# Patient Record
Sex: MALE | Race: WHITE | NOT HISPANIC OR LATINO | Employment: OTHER | URBAN - METROPOLITAN AREA
[De-identification: names, ages, dates, MRNs, and addresses within clinical notes are randomized per-mention and may not be internally consistent; named-entity substitution may affect disease eponyms.]

---

## 2017-03-24 PROBLEM — N18.9 CKD (CHRONIC KIDNEY DISEASE): Status: ACTIVE | Noted: 2017-03-24

## 2018-03-21 PROBLEM — D50.9 MICROCYTIC ANEMIA: Status: ACTIVE | Noted: 2018-03-21

## 2018-03-21 PROBLEM — R80.1 PERSISTENT PROTEINURIA: Status: ACTIVE | Noted: 2018-03-21

## 2018-03-21 PROBLEM — E87.20 METABOLIC ACIDOSIS, NORMAL ANION GAP (NAG): Status: ACTIVE | Noted: 2018-03-21

## 2018-03-21 PROBLEM — E04.1 THYROID NODULE: Status: ACTIVE | Noted: 2018-03-21

## 2018-03-21 PROBLEM — I10 ESSENTIAL (PRIMARY) HYPERTENSION: Status: ACTIVE | Noted: 2018-03-21

## 2018-03-21 PROBLEM — R55 SYNCOPE: Status: ACTIVE | Noted: 2018-03-21

## 2018-03-21 PROBLEM — E11.51 TYPE 2 DIABETES MELLITUS WITH DIABETIC PERIPHERAL ANGIOPATHY WITHOUT GANGRENE, WITHOUT LONG-TERM CURRENT USE OF INSULIN: Status: ACTIVE | Noted: 2018-03-21

## 2018-03-21 PROBLEM — D64.9 NORMOCYTIC ANEMIA: Status: ACTIVE | Noted: 2018-03-21

## 2018-03-21 PROBLEM — N18.4 CKD (CHRONIC KIDNEY DISEASE) STAGE 4, GFR 15-29 ML/MIN: Status: ACTIVE | Noted: 2017-03-24

## 2018-03-21 PROBLEM — I48.20 CHRONIC ATRIAL FIBRILLATION: Status: ACTIVE | Noted: 2018-03-21

## 2018-03-22 PROBLEM — I42.8 NON-ISCHEMIC CARDIOMYOPATHY: Status: ACTIVE | Noted: 2018-03-22

## 2018-03-22 PROBLEM — R63.8 ALTERATION IN NUTRITION: Status: ACTIVE | Noted: 2018-03-22

## 2018-03-23 PROBLEM — D50.0 CHRONIC BLOOD LOSS ANEMIA: Status: ACTIVE | Noted: 2018-03-23

## 2018-03-30 PROBLEM — E27.8 LEFT ADRENAL MASS: Status: ACTIVE | Noted: 2018-03-30

## 2018-04-06 DIAGNOSIS — E27.8 ADRENAL MASS, LEFT: Primary | ICD-10-CM

## 2018-04-06 PROBLEM — Z90.5 SOLITARY KIDNEY, ACQUIRED: Status: ACTIVE | Noted: 2018-04-06

## 2018-04-06 PROBLEM — E11.21 DIABETIC KIDNEY DISEASE: Status: ACTIVE | Noted: 2018-04-06

## 2018-04-12 PROBLEM — E44.0 MALNUTRITION OF MODERATE DEGREE: Status: ACTIVE | Noted: 2018-04-12

## 2018-05-02 PROBLEM — N18.9 CHRONIC RENAL IMPAIRMENT: Status: ACTIVE | Noted: 2018-05-02

## 2018-05-02 PROBLEM — J18.9 PNEUMONIA OF RIGHT LOWER LOBE DUE TO INFECTIOUS ORGANISM: Status: ACTIVE | Noted: 2018-05-02

## 2018-05-02 PROBLEM — I50.9 ACUTE ON CHRONIC CONGESTIVE HEART FAILURE: Status: ACTIVE | Noted: 2018-05-02

## 2018-05-07 PROBLEM — E11.9 DIABETES: Status: ACTIVE | Noted: 2018-05-07

## 2018-05-07 PROBLEM — N18.4 CKD (CHRONIC KIDNEY DISEASE) STAGE 4, GFR 15-29 ML/MIN: Status: ACTIVE | Noted: 2018-05-07

## 2018-05-07 PROBLEM — N17.9 ACUTE RENAL FAILURE SUPERIMPOSED ON STAGE 4 CHRONIC KIDNEY DISEASE: Status: ACTIVE | Noted: 2017-03-24

## 2018-05-08 PROBLEM — E87.0 HYPERNATREMIA: Status: ACTIVE | Noted: 2018-05-08

## 2018-05-08 PROBLEM — Z86.2 HISTORY OF ANEMIA DUE TO CHRONIC KIDNEY DISEASE: Status: ACTIVE | Noted: 2018-05-08

## 2018-05-08 PROBLEM — E87.6 HYPOKALEMIA: Status: ACTIVE | Noted: 2018-05-08

## 2018-05-08 PROBLEM — N18.9 HISTORY OF ANEMIA DUE TO CHRONIC KIDNEY DISEASE: Status: ACTIVE | Noted: 2018-05-08

## 2018-05-14 ENCOUNTER — HOSPITAL ENCOUNTER (INPATIENT)
Facility: HOSPITAL | Age: 83
LOS: 14 days | Discharge: NURSING, REHAB OR LONG-TERM CARE | DRG: 614 | End: 2018-05-28
Attending: SURGERY | Admitting: SURGERY
Payer: MEDICARE

## 2018-05-14 DIAGNOSIS — R06.02 SHORTNESS OF BREATH: Primary | ICD-10-CM

## 2018-05-14 DIAGNOSIS — D49.7 ADRENAL TUMOR: ICD-10-CM

## 2018-05-14 LAB
ALBUMIN SERPL BCP-MCNC: 2 G/DL
ALP SERPL-CCNC: 109 U/L
ALT SERPL W/O P-5'-P-CCNC: <5 U/L
ANION GAP SERPL CALC-SCNC: 5 MMOL/L
APTT BLDCRRT: 24.7 SEC
AST SERPL-CCNC: 6 U/L
BASOPHILS # BLD AUTO: 0.04 K/UL
BASOPHILS NFR BLD: 0.6 %
BILIRUB SERPL-MCNC: 0.7 MG/DL
BUN SERPL-MCNC: 25 MG/DL
CALCIUM SERPL-MCNC: 9.5 MG/DL
CHLORIDE SERPL-SCNC: 105 MMOL/L
CO2 SERPL-SCNC: 26 MMOL/L
CREAT SERPL-MCNC: 2.7 MG/DL
DIFFERENTIAL METHOD: ABNORMAL
EOSINOPHIL # BLD AUTO: 0.1 K/UL
EOSINOPHIL NFR BLD: 1.5 %
ERYTHROCYTE [DISTWIDTH] IN BLOOD BY AUTOMATED COUNT: 19.9 %
EST. GFR  (AFRICAN AMERICAN): 24.6 ML/MIN/1.73 M^2
EST. GFR  (NON AFRICAN AMERICAN): 21.3 ML/MIN/1.73 M^2
ESTIMATED AVG GLUCOSE: 123 MG/DL
GLUCOSE SERPL-MCNC: 141 MG/DL
HBA1C MFR BLD HPLC: 5.9 %
HCT VFR BLD AUTO: 27.6 %
HGB BLD-MCNC: 8.6 G/DL
IMM GRANULOCYTES # BLD AUTO: 0.05 K/UL
IMM GRANULOCYTES NFR BLD AUTO: 0.7 %
INR PPP: 1.1
LYMPHOCYTES # BLD AUTO: 0.9 K/UL
LYMPHOCYTES NFR BLD: 11.7 %
MAGNESIUM SERPL-MCNC: 1.8 MG/DL
MCH RBC QN AUTO: 27 PG
MCHC RBC AUTO-ENTMCNC: 31.2 G/DL
MCV RBC AUTO: 87 FL
MONOCYTES # BLD AUTO: 0.7 K/UL
MONOCYTES NFR BLD: 9.1 %
NEUTROPHILS # BLD AUTO: 5.5 K/UL
NEUTROPHILS NFR BLD: 76.4 %
NRBC BLD-RTO: 0 /100 WBC
PHOSPHATE SERPL-MCNC: 2.7 MG/DL
PLATELET # BLD AUTO: 216 K/UL
PMV BLD AUTO: 11.2 FL
POTASSIUM SERPL-SCNC: 4.3 MMOL/L
PROT SERPL-MCNC: 6.7 G/DL
PROTHROMBIN TIME: 11 SEC
RBC # BLD AUTO: 3.19 M/UL
SODIUM SERPL-SCNC: 136 MMOL/L
WBC # BLD AUTO: 7.25 K/UL

## 2018-05-14 PROCEDURE — 84100 ASSAY OF PHOSPHORUS: CPT

## 2018-05-14 PROCEDURE — 36415 COLL VENOUS BLD VENIPUNCTURE: CPT

## 2018-05-14 PROCEDURE — 83735 ASSAY OF MAGNESIUM: CPT

## 2018-05-14 PROCEDURE — 25000003 PHARM REV CODE 250: Performed by: STUDENT IN AN ORGANIZED HEALTH CARE EDUCATION/TRAINING PROGRAM

## 2018-05-14 PROCEDURE — 85730 THROMBOPLASTIN TIME PARTIAL: CPT

## 2018-05-14 PROCEDURE — 85610 PROTHROMBIN TIME: CPT

## 2018-05-14 PROCEDURE — 83036 HEMOGLOBIN GLYCOSYLATED A1C: CPT

## 2018-05-14 PROCEDURE — 82088 ASSAY OF ALDOSTERONE: CPT | Mod: 91

## 2018-05-14 PROCEDURE — 82088 ASSAY OF ALDOSTERONE: CPT

## 2018-05-14 PROCEDURE — 85025 COMPLETE CBC W/AUTO DIFF WBC: CPT | Mod: 91

## 2018-05-14 PROCEDURE — 80053 COMPREHEN METABOLIC PANEL: CPT | Mod: 91

## 2018-05-14 PROCEDURE — 83835 ASSAY OF METANEPHRINES: CPT

## 2018-05-14 PROCEDURE — 85520 HEPARIN ASSAY: CPT

## 2018-05-14 PROCEDURE — 20600001 HC STEP DOWN PRIVATE ROOM

## 2018-05-14 RX ORDER — IBUPROFEN 200 MG
16 TABLET ORAL
Status: DISCONTINUED | OUTPATIENT
Start: 2018-05-14 | End: 2018-05-28 | Stop reason: HOSPADM

## 2018-05-14 RX ORDER — AMLODIPINE BESYLATE 5 MG/1
5 TABLET ORAL DAILY
Status: DISCONTINUED | OUTPATIENT
Start: 2018-05-15 | End: 2018-05-19

## 2018-05-14 RX ORDER — SODIUM BICARBONATE 650 MG/1
1 TABLET ORAL 3 TIMES DAILY
Status: DISCONTINUED | OUTPATIENT
Start: 2018-05-14 | End: 2018-05-28 | Stop reason: HOSPADM

## 2018-05-14 RX ORDER — IBUPROFEN 200 MG
24 TABLET ORAL
Status: DISCONTINUED | OUTPATIENT
Start: 2018-05-14 | End: 2018-05-28 | Stop reason: HOSPADM

## 2018-05-14 RX ORDER — IPRATROPIUM BROMIDE AND ALBUTEROL SULFATE 2.5; .5 MG/3ML; MG/3ML
3 SOLUTION RESPIRATORY (INHALATION) EVERY 8 HOURS
Status: DISCONTINUED | OUTPATIENT
Start: 2018-05-15 | End: 2018-05-20

## 2018-05-14 RX ORDER — GLUCAGON 1 MG
1 KIT INJECTION
Status: DISCONTINUED | OUTPATIENT
Start: 2018-05-14 | End: 2018-05-28 | Stop reason: HOSPADM

## 2018-05-14 RX ORDER — HEPARIN SODIUM,PORCINE/D5W 25000/250
17 INTRAVENOUS SOLUTION INTRAVENOUS CONTINUOUS
Status: DISPENSED | OUTPATIENT
Start: 2018-05-14 | End: 2018-05-21

## 2018-05-14 RX ORDER — POLYETHYLENE GLYCOL 3350 17 G/17G
17 POWDER, FOR SOLUTION ORAL DAILY
Status: DISCONTINUED | OUTPATIENT
Start: 2018-05-15 | End: 2018-05-21

## 2018-05-14 RX ORDER — INSULIN ASPART 100 [IU]/ML
0-5 INJECTION, SOLUTION INTRAVENOUS; SUBCUTANEOUS
Status: DISCONTINUED | OUTPATIENT
Start: 2018-05-14 | End: 2018-05-28 | Stop reason: HOSPADM

## 2018-05-14 RX ORDER — MEMANTINE HYDROCHLORIDE 5 MG/1
10 TABLET ORAL 2 TIMES DAILY
Status: DISCONTINUED | OUTPATIENT
Start: 2018-05-14 | End: 2018-05-28 | Stop reason: HOSPADM

## 2018-05-14 RX ORDER — PANTOPRAZOLE SODIUM 40 MG/1
40 TABLET, DELAYED RELEASE ORAL DAILY
Status: DISCONTINUED | OUTPATIENT
Start: 2018-05-15 | End: 2018-05-28 | Stop reason: HOSPADM

## 2018-05-14 RX ORDER — ACETAMINOPHEN 325 MG/1
650 TABLET ORAL EVERY 8 HOURS PRN
Status: DISCONTINUED | OUTPATIENT
Start: 2018-05-14 | End: 2018-05-14

## 2018-05-14 RX ORDER — ACETAMINOPHEN 325 MG/1
650 TABLET ORAL EVERY 8 HOURS PRN
Status: DISCONTINUED | OUTPATIENT
Start: 2018-05-14 | End: 2018-05-21

## 2018-05-14 RX ORDER — SODIUM CHLORIDE 0.9 % (FLUSH) 0.9 %
3 SYRINGE (ML) INJECTION
Status: DISCONTINUED | OUTPATIENT
Start: 2018-05-14 | End: 2018-05-28 | Stop reason: HOSPADM

## 2018-05-14 RX ORDER — ONDANSETRON 8 MG/1
8 TABLET, ORALLY DISINTEGRATING ORAL EVERY 8 HOURS PRN
Status: DISCONTINUED | OUTPATIENT
Start: 2018-05-14 | End: 2018-05-28 | Stop reason: HOSPADM

## 2018-05-14 RX ORDER — CLONIDINE HYDROCHLORIDE 0.1 MG/1
0.3 TABLET ORAL 3 TIMES DAILY
Status: DISCONTINUED | OUTPATIENT
Start: 2018-05-14 | End: 2018-05-28 | Stop reason: HOSPADM

## 2018-05-14 RX ORDER — ACETAMINOPHEN 325 MG/1
325 TABLET ORAL EVERY 6 HOURS PRN
Status: DISCONTINUED | OUTPATIENT
Start: 2018-05-14 | End: 2018-05-14

## 2018-05-14 RX ORDER — FERROUS SULFATE 325(65) MG
325 TABLET, DELAYED RELEASE (ENTERIC COATED) ORAL 2 TIMES DAILY
Status: DISCONTINUED | OUTPATIENT
Start: 2018-05-14 | End: 2018-05-28 | Stop reason: HOSPADM

## 2018-05-14 RX ORDER — CARVEDILOL 25 MG/1
25 TABLET ORAL 2 TIMES DAILY WITH MEALS
Status: DISCONTINUED | OUTPATIENT
Start: 2018-05-14 | End: 2018-05-28 | Stop reason: HOSPADM

## 2018-05-14 RX ORDER — DIPHENHYDRAMINE HYDROCHLORIDE 50 MG/ML
25 INJECTION INTRAMUSCULAR; INTRAVENOUS EVERY 4 HOURS PRN
Status: DISCONTINUED | OUTPATIENT
Start: 2018-05-14 | End: 2018-05-21

## 2018-05-14 RX ORDER — ISOSORBIDE DINITRATE AND HYDRALAZINE HYDROCHLORIDE 37.5; 2 MG/1; MG/1
2 TABLET ORAL 3 TIMES DAILY
Status: DISCONTINUED | OUTPATIENT
Start: 2018-05-14 | End: 2018-05-28 | Stop reason: HOSPADM

## 2018-05-14 RX ORDER — DUTASTERIDE 0.5 MG/1
0.5 CAPSULE, LIQUID FILLED ORAL DAILY
Status: DISCONTINUED | OUTPATIENT
Start: 2018-05-15 | End: 2018-05-28 | Stop reason: HOSPADM

## 2018-05-14 RX ADMIN — CLONIDINE HYDROCHLORIDE 0.3 MG: 0.1 TABLET ORAL at 09:05

## 2018-05-14 RX ADMIN — CARVEDILOL 25 MG: 25 TABLET, FILM COATED ORAL at 09:05

## 2018-05-14 RX ADMIN — MEMANTINE HYDROCHLORIDE 10 MG: 5 TABLET ORAL at 09:05

## 2018-05-14 RX ADMIN — SODIUM BICARBONATE 650 MG TABLET 650 MG: at 09:05

## 2018-05-14 RX ADMIN — FERROUS SULFATE TAB EC 325 MG (65 MG FE EQUIVALENT) 325 MG: 325 (65 FE) TABLET DELAYED RESPONSE at 09:05

## 2018-05-14 RX ADMIN — HYDRALAZINE HYDROCHLORIDE AND ISOSORBIDE DINITRATE 2 TABLET: 37.5; 2 TABLET, FILM COATED ORAL at 09:05

## 2018-05-14 NOTE — PROGRESS NOTES
Care plan reviewed and understood by patient and daughter. Resp rate even and unlabored. VSS.  Patient complains of unable to urinate. Bladder scan showed 723ml after voiding 50mls. Per Dr. Spann placed a calabrese. Patient with no reports of nausea. Daughter phone number placed in chart eith password set when they call. Patient remain free of falls. No acute pain or distress noted. Will continue to monitor.

## 2018-05-15 PROBLEM — R33.9 URINARY RETENTION: Status: ACTIVE | Noted: 2018-05-15

## 2018-05-15 PROBLEM — F03.90 DEMENTIA: Status: ACTIVE | Noted: 2018-05-15

## 2018-05-15 LAB
ALBUMIN SERPL BCP-MCNC: 2.1 G/DL
ALP SERPL-CCNC: 111 U/L
ALT SERPL W/O P-5'-P-CCNC: <5 U/L
ANION GAP SERPL CALC-SCNC: 10 MMOL/L
AST SERPL-CCNC: 6 U/L
BASOPHILS # BLD AUTO: 0.04 K/UL
BASOPHILS NFR BLD: 0.6 %
BILIRUB SERPL-MCNC: 0.7 MG/DL
BUN SERPL-MCNC: 24 MG/DL
CALCIUM SERPL-MCNC: 9.3 MG/DL
CHLORIDE SERPL-SCNC: 106 MMOL/L
CO2 SERPL-SCNC: 22 MMOL/L
CREAT SERPL-MCNC: 2.5 MG/DL
DIFFERENTIAL METHOD: ABNORMAL
EOSINOPHIL # BLD AUTO: 0.1 K/UL
EOSINOPHIL NFR BLD: 1.9 %
ERYTHROCYTE [DISTWIDTH] IN BLOOD BY AUTOMATED COUNT: 19.8 %
EST. GFR  (AFRICAN AMERICAN): 27 ML/MIN/1.73 M^2
EST. GFR  (NON AFRICAN AMERICAN): 23.4 ML/MIN/1.73 M^2
FACT X PPP CHRO-ACNC: 0.2 IU/ML
FACT X PPP CHRO-ACNC: 0.32 IU/ML
FACT X PPP CHRO-ACNC: <0.1 IU/ML
GLUCOSE SERPL-MCNC: 129 MG/DL
HCT VFR BLD AUTO: 27.8 %
HGB BLD-MCNC: 8.6 G/DL
IMM GRANULOCYTES # BLD AUTO: 0.04 K/UL
IMM GRANULOCYTES NFR BLD AUTO: 0.6 %
LYMPHOCYTES # BLD AUTO: 0.9 K/UL
LYMPHOCYTES NFR BLD: 12.7 %
MAGNESIUM SERPL-MCNC: 2 MG/DL
MCH RBC QN AUTO: 26.6 PG
MCHC RBC AUTO-ENTMCNC: 30.9 G/DL
MCV RBC AUTO: 86 FL
MONOCYTES # BLD AUTO: 0.6 K/UL
MONOCYTES NFR BLD: 8.9 %
NEUTROPHILS # BLD AUTO: 5.1 K/UL
NEUTROPHILS NFR BLD: 75.3 %
NRBC BLD-RTO: 0 /100 WBC
PHOSPHATE SERPL-MCNC: 2.9 MG/DL
PLATELET # BLD AUTO: 235 K/UL
PMV BLD AUTO: 12 FL
POCT GLUCOSE: 155 MG/DL (ref 70–110)
POCT GLUCOSE: 200 MG/DL (ref 70–110)
POTASSIUM SERPL-SCNC: 3.9 MMOL/L
PROT SERPL-MCNC: 6.7 G/DL
RBC # BLD AUTO: 3.23 M/UL
SODIUM SERPL-SCNC: 138 MMOL/L
WBC # BLD AUTO: 6.77 K/UL

## 2018-05-15 PROCEDURE — 80053 COMPREHEN METABOLIC PANEL: CPT

## 2018-05-15 PROCEDURE — 63600175 PHARM REV CODE 636 W HCPCS: Performed by: STUDENT IN AN ORGANIZED HEALTH CARE EDUCATION/TRAINING PROGRAM

## 2018-05-15 PROCEDURE — 25000242 PHARM REV CODE 250 ALT 637 W/ HCPCS: Performed by: STUDENT IN AN ORGANIZED HEALTH CARE EDUCATION/TRAINING PROGRAM

## 2018-05-15 PROCEDURE — 94761 N-INVAS EAR/PLS OXIMETRY MLT: CPT

## 2018-05-15 PROCEDURE — 83735 ASSAY OF MAGNESIUM: CPT

## 2018-05-15 PROCEDURE — 36415 COLL VENOUS BLD VENIPUNCTURE: CPT

## 2018-05-15 PROCEDURE — 84100 ASSAY OF PHOSPHORUS: CPT

## 2018-05-15 PROCEDURE — 85025 COMPLETE CBC W/AUTO DIFF WBC: CPT

## 2018-05-15 PROCEDURE — 85520 HEPARIN ASSAY: CPT

## 2018-05-15 PROCEDURE — 99223 1ST HOSP IP/OBS HIGH 75: CPT | Mod: AI,,, | Performed by: SURGERY

## 2018-05-15 PROCEDURE — 25000003 PHARM REV CODE 250: Performed by: STUDENT IN AN ORGANIZED HEALTH CARE EDUCATION/TRAINING PROGRAM

## 2018-05-15 PROCEDURE — 94640 AIRWAY INHALATION TREATMENT: CPT

## 2018-05-15 PROCEDURE — 20600001 HC STEP DOWN PRIVATE ROOM

## 2018-05-15 PROCEDURE — 82530 CORTISOL FREE: CPT

## 2018-05-15 RX ADMIN — FERROUS SULFATE TAB EC 325 MG (65 MG FE EQUIVALENT) 325 MG: 325 (65 FE) TABLET DELAYED RESPONSE at 09:05

## 2018-05-15 RX ADMIN — IPRATROPIUM BROMIDE AND ALBUTEROL SULFATE 3 ML: .5; 3 SOLUTION RESPIRATORY (INHALATION) at 12:05

## 2018-05-15 RX ADMIN — POLYETHYLENE GLYCOL 3350 17 G: 17 POWDER, FOR SOLUTION ORAL at 09:05

## 2018-05-15 RX ADMIN — CLONIDINE HYDROCHLORIDE 0.3 MG: 0.1 TABLET ORAL at 03:05

## 2018-05-15 RX ADMIN — SODIUM BICARBONATE 650 MG TABLET 650 MG: at 09:05

## 2018-05-15 RX ADMIN — DUTASTERIDE 0.5 MG: 0.5 CAPSULE, LIQUID FILLED ORAL at 09:05

## 2018-05-15 RX ADMIN — MEMANTINE HYDROCHLORIDE 10 MG: 5 TABLET ORAL at 08:05

## 2018-05-15 RX ADMIN — HYDRALAZINE HYDROCHLORIDE AND ISOSORBIDE DINITRATE 2 TABLET: 37.5; 2 TABLET, FILM COATED ORAL at 09:05

## 2018-05-15 RX ADMIN — HEPARIN SODIUM 18.97 UNITS/KG/HR: 10000 INJECTION, SOLUTION INTRAVENOUS at 04:05

## 2018-05-15 RX ADMIN — FERROUS SULFATE TAB EC 325 MG (65 MG FE EQUIVALENT) 325 MG: 325 (65 FE) TABLET DELAYED RESPONSE at 08:05

## 2018-05-15 RX ADMIN — CLONIDINE HYDROCHLORIDE 0.3 MG: 0.1 TABLET ORAL at 08:05

## 2018-05-15 RX ADMIN — MEMANTINE HYDROCHLORIDE 10 MG: 5 TABLET ORAL at 09:05

## 2018-05-15 RX ADMIN — HYDRALAZINE HYDROCHLORIDE AND ISOSORBIDE DINITRATE 2 TABLET: 37.5; 2 TABLET, FILM COATED ORAL at 03:05

## 2018-05-15 RX ADMIN — PANTOPRAZOLE SODIUM 40 MG: 40 TABLET, DELAYED RELEASE ORAL at 09:05

## 2018-05-15 RX ADMIN — CARVEDILOL 25 MG: 25 TABLET, FILM COATED ORAL at 05:05

## 2018-05-15 RX ADMIN — AMLODIPINE BESYLATE 5 MG: 5 TABLET ORAL at 09:05

## 2018-05-15 RX ADMIN — HYDRALAZINE HYDROCHLORIDE AND ISOSORBIDE DINITRATE 2 TABLET: 37.5; 2 TABLET, FILM COATED ORAL at 08:05

## 2018-05-15 RX ADMIN — HEPARIN SODIUM 17 UNITS/KG/HR: 10000 INJECTION, SOLUTION INTRAVENOUS at 12:05

## 2018-05-15 RX ADMIN — SODIUM BICARBONATE 650 MG TABLET 650 MG: at 08:05

## 2018-05-15 RX ADMIN — HEPARIN SODIUM 19 UNITS/KG/HR: 10000 INJECTION, SOLUTION INTRAVENOUS at 07:05

## 2018-05-15 RX ADMIN — IPRATROPIUM BROMIDE AND ALBUTEROL SULFATE 3 ML: .5; 3 SOLUTION RESPIRATORY (INHALATION) at 07:05

## 2018-05-15 RX ADMIN — IPRATROPIUM BROMIDE AND ALBUTEROL SULFATE 3 ML: .5; 3 SOLUTION RESPIRATORY (INHALATION) at 03:05

## 2018-05-15 RX ADMIN — CLONIDINE HYDROCHLORIDE 0.3 MG: 0.1 TABLET ORAL at 09:05

## 2018-05-15 RX ADMIN — SODIUM BICARBONATE 650 MG TABLET 650 MG: at 03:05

## 2018-05-15 RX ADMIN — CARVEDILOL 25 MG: 25 TABLET, FILM COATED ORAL at 09:05

## 2018-05-15 NOTE — SUBJECTIVE & OBJECTIVE
Past Medical History:   Diagnosis Date    Acute on chronic congestive heart failure     Anemia     receiving blood today 03/24/2017    Blindness, right eye, normal vision left eye     Cancer     kidney cancer 2007    Chronic atrial fibrillation 3/21/2018    Coronary artery disease     pacemaker 2014    Dementia 5/15/2018    Diabetes mellitus, type 2     Disorder of kidney and ureter     right kidney removed    Encounter for blood transfusion     GERD (gastroesophageal reflux disease)     sometimes heartburn    Hyperlipidemia     Hypertension        Past Surgical History:   Procedure Laterality Date    CARDIAC PACEMAKER PLACEMENT      CARDIAC PACEMAKER PLACEMENT Left 12/09/2014    COLON SURGERY      COLONOSCOPY N/A 3/26/2018    Procedure: COLONOSCOPY;  Surgeon: Jonas Belle MD;  Location: [facility];  Service: Endoscopy;  Laterality: N/A;    KIDNEY SURGERY Right     for cancer       Review of patient's allergies indicates:  No Known Allergies    Current Facility-Administered Medications on File Prior to Encounter   Medication    [DISCONTINUED] acetaminophen tablet 325 mg    [DISCONTINUED] albuterol-ipratropium 2.5mg-0.5mg/3mL nebulizer solution 3 mL    [DISCONTINUED] amLODIPine tablet 5 mg    [DISCONTINUED] carvedilol tablet 25 mg    [DISCONTINUED] cloNIDine tablet 0.3 mg    [DISCONTINUED] dutasteride capsule 0.5 mg    [DISCONTINUED] ferrous sulfate tablet 325 mg    [DISCONTINUED] hydrALAZINE injection 5 mg    [DISCONTINUED] isosorbide-hydrALAZINE 20-37.5 mg per tablet 2 tablet    [DISCONTINUED] memantine tablet 10 mg    [DISCONTINUED] morphine injection 2 mg    [DISCONTINUED] olmesartan tablet 20 mg    [DISCONTINUED] ondansetron injection 4 mg    [DISCONTINUED] pantoprazole EC tablet 40 mg    [DISCONTINUED] polyethylene glycol packet 17 g    [DISCONTINUED] sodium bicarbonate tablet 650 mg     Current Outpatient Prescriptions on File Prior to Encounter   Medication Sig     acetaminophen (TYLENOL) 325 MG tablet Take 1 tablet (325 mg total) by mouth every 6 (six) hours as needed.    albuterol-ipratropium 2.5mg-0.5mg/3mL (DUO-NEB) 0.5 mg-3 mg(2.5 mg base)/3 mL nebulizer solution Take 3 mLs by nebulization every 8 (eight) hours. Rescue    amLODIPine (NORVASC) 5 MG tablet Take 1 tablet (5 mg total) by mouth once daily.    carvedilol (COREG) 25 MG tablet Take 1 tablet (25 mg total) by mouth 2 (two) times daily with meals.    cloNIDine (CATAPRES) 0.3 MG tablet Take 1 tablet (0.3 mg total) by mouth 3 (three) times daily.    dutasteride (AVODART) 0.5 mg capsule Take 0.5 mg by mouth once daily.    ferrous sulfate 325 (65 FE) MG EC tablet Take 1 tablet (325 mg total) by mouth 2 (two) times daily. (Patient taking differently: Take 325 mg by mouth 2 (two) times daily. For 50 days)    hydrALAZINE (APRESOLINE) 20 mg/mL injection Inject 0.25 mLs (5 mg total) into the vein every 6 (six) hours as needed (170mmhg).    isosorbide-hydrALAZINE 20-37.5 mg (BIDIL) 20-37.5 mg Tab Take 2 tablets by mouth 3 (three) times daily.    memantine (NAMENDA) 10 MG Tab Take 10 mg by mouth 2 (two) times daily.     nut.tx.imp.renal fxn,lac-reduc (SUPLENA CARB STEADY) 0.04 gram-1.8 kcal/mL Liqd 1 can tid    olmesartan (BENICAR) 20 MG tablet Take 1 tablet (20 mg total) by mouth once daily.    ondansetron 4 mg/2 mL Soln Inject 4 mg into the vein every 6 (six) hours as needed.    pantoprazole (PROTONIX) 40 MG tablet Take 1 tablet (40 mg total) by mouth once daily.    polyethylene glycol (GLYCOLAX) 17 gram PwPk Take 17 g by mouth once daily.    sodium bicarbonate 650 MG tablet Take 1 tablet (650 mg total) by mouth 3 (three) times daily.     Family History     Problem Relation (Age of Onset)    Cancer Father, Brother        Social History Main Topics    Smoking status: Light Tobacco Smoker    Smokeless tobacco: Never Used      Comment: no cigarettes in 6 weeks approximately    Alcohol use No    Drug use: No     Sexual activity: No     Review of Systems   Constitution: Negative for chills and fever.   HENT: Negative for congestion and stridor.    Eyes: Negative for double vision.   Cardiovascular: Positive for dyspnea on exertion and orthopnea. Negative for chest pain, irregular heartbeat, leg swelling, palpitations and syncope.   Respiratory: Positive for shortness of breath. Negative for hemoptysis.    Musculoskeletal: Negative for myalgias.   Gastrointestinal: Positive for abdominal pain. Negative for jaundice, nausea and vomiting.   Genitourinary: Positive for incomplete emptying.   Psychiatric/Behavioral: Negative for altered mental status and hallucinations.     Objective:     Vital Signs (Most Recent):  Temp: 98.5 °F (36.9 °C) (05/15/18 1142)  Pulse: 72 (05/15/18 1142)  Resp: 18 (05/15/18 1142)  BP: (!) 184/93 (05/15/18 1142)  SpO2: 96 % (05/15/18 1142) Vital Signs (24h Range):  Temp:  [96.1 °F (35.6 °C)-98.5 °F (36.9 °C)] 98.5 °F (36.9 °C)  Pulse:  [69-87] 72  Resp:  [15-20] 18  SpO2:  [95 %-97 %] 96 %  BP: (160-184)/(81-93) 184/93     Weight: 106.5 kg (234 lb 12.8 oz)  Body mass index is 29.35 kg/m².    SpO2: 96 %  O2 Device (Oxygen Therapy): room air      Intake/Output Summary (Last 24 hours) at 05/15/18 1519  Last data filed at 05/15/18 0753   Gross per 24 hour   Intake                0 ml   Output              900 ml   Net             -900 ml       Lines/Drains/Airways     Drain                 Urethral Catheter 05/14/18 1920 less than 1 day          Peripheral Intravenous Line                 Peripheral IV - Single Lumen 05/10/18 0000 Right Forearm 5 days                Physical Exam   Constitutional: He appears well-developed and well-nourished. No distress.   HENT:   Head: Normocephalic and atraumatic.   Eyes: Pupils are equal, round, and reactive to light. No scleral icterus.   Neck: Normal range of motion. Neck supple. No JVD present.   Cardiovascular: Normal rate.    Murmur (2/6 systolic murmur  HB@L4ICS) heard.  Paced at 70bpm   Pulmonary/Chest: Effort normal. No stridor. No respiratory distress. He has no wheezes. He has rales (L>R).   Skin: He is not diaphoretic.   Nursing note and vitals reviewed.      Significant Labs:    Recent Results (from the past 24 hour(s))   Hemoglobin A1c if not done in past 6 weeks    Collection Time: 05/14/18  8:51 PM   Result Value Ref Range    Hemoglobin A1C 5.9 (H) 4.0 - 5.6 %    Estimated Avg Glucose 123 68 - 131 mg/dL   Comprehensive metabolic panel    Collection Time: 05/14/18  8:51 PM   Result Value Ref Range    Sodium 136 136 - 145 mmol/L    Potassium 4.3 3.5 - 5.1 mmol/L    Chloride 105 95 - 110 mmol/L    CO2 26 23 - 29 mmol/L    Glucose 141 (H) 70 - 110 mg/dL    BUN, Bld 25 (H) 8 - 23 mg/dL    Creatinine 2.7 (H) 0.5 - 1.4 mg/dL    Calcium 9.5 8.7 - 10.5 mg/dL    Total Protein 6.7 6.0 - 8.4 g/dL    Albumin 2.0 (L) 3.5 - 5.2 g/dL    Total Bilirubin 0.7 0.1 - 1.0 mg/dL    Alkaline Phosphatase 109 55 - 135 U/L    AST 6 (L) 10 - 40 U/L    ALT <5 (L) 10 - 44 U/L    Anion Gap 5 (L) 8 - 16 mmol/L    eGFR if African American 24.6 (A) >60 mL/min/1.73 m^2    eGFR if non  21.3 (A) >60 mL/min/1.73 m^2   Magnesium    Collection Time: 05/14/18  8:51 PM   Result Value Ref Range    Magnesium 1.8 1.6 - 2.6 mg/dL   Phosphorus    Collection Time: 05/14/18  8:51 PM   Result Value Ref Range    Phosphorus 2.7 2.7 - 4.5 mg/dL   CBC auto differential    Collection Time: 05/14/18  8:51 PM   Result Value Ref Range    WBC 7.25 3.90 - 12.70 K/uL    RBC 3.19 (L) 4.60 - 6.20 M/uL    Hemoglobin 8.6 (L) 14.0 - 18.0 g/dL    Hematocrit 27.6 (L) 40.0 - 54.0 %    MCV 87 82 - 98 fL    MCH 27.0 27.0 - 31.0 pg    MCHC 31.2 (L) 32.0 - 36.0 g/dL    RDW 19.9 (H) 11.5 - 14.5 %    Platelets 216 150 - 350 K/uL    MPV 11.2 9.2 - 12.9 fL    Immature Granulocytes 0.7 (H) 0.0 - 0.5 %    Gran # (ANC) 5.5 1.8 - 7.7 K/uL    Immature Grans (Abs) 0.05 (H) 0.00 - 0.04 K/uL    Lymph # 0.9 (L) 1.0 - 4.8  K/uL    Mono # 0.7 0.3 - 1.0 K/uL    Eos # 0.1 0.0 - 0.5 K/uL    Baso # 0.04 0.00 - 0.20 K/uL    nRBC 0 0 /100 WBC    Gran% 76.4 (H) 38.0 - 73.0 %    Lymph% 11.7 (L) 18.0 - 48.0 %    Mono% 9.1 4.0 - 15.0 %    Eosinophil% 1.5 0.0 - 8.0 %    Basophil% 0.6 0.0 - 1.9 %    Differential Method Automated    Protime-INR    Collection Time: 05/14/18  8:51 PM   Result Value Ref Range    Prothrombin Time 11.0 9.0 - 12.5 sec    INR 1.1 0.8 - 1.2   APTT    Collection Time: 05/14/18  8:51 PM   Result Value Ref Range    aPTT 24.7 21.0 - 32.0 sec   Anti-Xa Heparin Monitoring    Collection Time: 05/14/18 11:47 PM   Result Value Ref Range    Heparin Anti-Xa <0.10 (L) 0.30 - 0.70 IU/mL   CBC auto differential    Collection Time: 05/15/18  4:52 AM   Result Value Ref Range    WBC 6.77 3.90 - 12.70 K/uL    RBC 3.23 (L) 4.60 - 6.20 M/uL    Hemoglobin 8.6 (L) 14.0 - 18.0 g/dL    Hematocrit 27.8 (L) 40.0 - 54.0 %    MCV 86 82 - 98 fL    MCH 26.6 (L) 27.0 - 31.0 pg    MCHC 30.9 (L) 32.0 - 36.0 g/dL    RDW 19.8 (H) 11.5 - 14.5 %    Platelets 235 150 - 350 K/uL    MPV 12.0 9.2 - 12.9 fL    Immature Granulocytes 0.6 (H) 0.0 - 0.5 %    Gran # (ANC) 5.1 1.8 - 7.7 K/uL    Immature Grans (Abs) 0.04 0.00 - 0.04 K/uL    Lymph # 0.9 (L) 1.0 - 4.8 K/uL    Mono # 0.6 0.3 - 1.0 K/uL    Eos # 0.1 0.0 - 0.5 K/uL    Baso # 0.04 0.00 - 0.20 K/uL    nRBC 0 0 /100 WBC    Gran% 75.3 (H) 38.0 - 73.0 %    Lymph% 12.7 (L) 18.0 - 48.0 %    Mono% 8.9 4.0 - 15.0 %    Eosinophil% 1.9 0.0 - 8.0 %    Basophil% 0.6 0.0 - 1.9 %    Differential Method Automated    Comprehensive metabolic panel    Collection Time: 05/15/18  4:52 AM   Result Value Ref Range    Sodium 138 136 - 145 mmol/L    Potassium 3.9 3.5 - 5.1 mmol/L    Chloride 106 95 - 110 mmol/L    CO2 22 (L) 23 - 29 mmol/L    Glucose 129 (H) 70 - 110 mg/dL    BUN, Bld 24 (H) 8 - 23 mg/dL    Creatinine 2.5 (H) 0.5 - 1.4 mg/dL    Calcium 9.3 8.7 - 10.5 mg/dL    Total Protein 6.7 6.0 - 8.4 g/dL    Albumin 2.1 (L) 3.5 -  5.2 g/dL    Total Bilirubin 0.7 0.1 - 1.0 mg/dL    Alkaline Phosphatase 111 55 - 135 U/L    AST 6 (L) 10 - 40 U/L    ALT <5 (L) 10 - 44 U/L    Anion Gap 10 8 - 16 mmol/L    eGFR if African American 27.0 (A) >60 mL/min/1.73 m^2    eGFR if non  23.4 (A) >60 mL/min/1.73 m^2   Magnesium    Collection Time: 05/15/18  4:52 AM   Result Value Ref Range    Magnesium 2.0 1.6 - 2.6 mg/dL   Phosphorus    Collection Time: 05/15/18  4:52 AM   Result Value Ref Range    Phosphorus 2.9 2.7 - 4.5 mg/dL   Anti-Xa Heparin Monitoring    Collection Time: 05/15/18  4:52 AM   Result Value Ref Range    Heparin Anti-Xa 0.20 (L) 0.30 - 0.70 IU/mL   Anti-Xa Heparin Monitoring    Collection Time: 05/15/18  1:52 PM   Result Value Ref Range    Heparin Anti-Xa 0.32 0.30 - 0.70 IU/mL       Microbiology Results (last 7 days)     ** No results found for the last 168 hours. **            Significant Imaging:     Imaging Results    None       CXR on presented originally on 5/2/18:   Heterogeneous opacification right mid and right lower lung zones, may reflect edema or pneumonia.    Last ECHO (bubble) was 3/22/18:  Final Impressions  1. The study quality is good.   2. Global left ventricular systolic function is moderately decreased.   The left ventricular ejection fraction is 36%.   3. Left ventricular diastolic function is indeterminate.   4. Left ventricular diastolic septal thickness is 1.8 cms. Left   ventricular diastolic postero basal free wall thickness is 1.8 cms.   Noted left ventricular hypertrophy. It is moderate.   5. Patient appears to have Moderate aortic valve stenosis. JOON (VTI)   is 1.58 cm^2.   6. Mild calcification of the aortic valve is noted with reduced cuspal   excursion. Mild mitral annular calcification is noted.   7. The left atrium is moderately enlarged. Left atrial diameter is 5.1   cms.    8. Mild to moderate (1-2+) tricuspid regurgitation. Mild (1+) mitral   regurgitation.    9. No bubbles were visualized  in the left heart chambers at rest or   with valsalva. The bubble study was negative, ruling out patent   foramen ovale.    10. Subcostals were not obtained.  11. Patient has a pacemaker.  12. The pulmonary artery systolic pressure is 39 mmHg.

## 2018-05-15 NOTE — SUBJECTIVE & OBJECTIVE
Interval History:   Patient seen and examined, no acute events overnight  Tolerating minimal diet  +F/BM  Afebrile/baseline hypertensive    Medications:  Continuous Infusions:   heparin (porcine) in D5W 17 Units/kg/hr (05/15/18 0013)     Scheduled Meds:   albuterol-ipratropium 2.5mg-0.5mg/3mL  3 mL Nebulization Q8H    amLODIPine  5 mg Oral Daily    carvedilol  25 mg Oral BID WM    cloNIDine  0.3 mg Oral TID    dutasteride  0.5 mg Oral Daily    ferrous sulfate  325 mg Oral BID    isosorbide-hydrALAZINE 20-37.5 mg  2 tablet Oral TID    memantine  10 mg Oral BID    pantoprazole  40 mg Oral Daily    polyethylene glycol  17 g Oral Daily    sodium bicarbonate  1 tablet Oral TID     PRN Meds:acetaminophen, dextrose 50%, dextrose 50%, diphenhydrAMINE, glucagon (human recombinant), glucose, glucose, heparin (PORCINE), heparin (PORCINE), insulin aspart U-100, ondansetron, promethazine (PHENERGAN) IVPB, sodium chloride 0.9%     Review of patient's allergies indicates:  No Known Allergies  Objective:     Vital Signs (Most Recent):  Temp: 97.5 °F (36.4 °C) (05/15/18 0427)  Pulse: 69 (05/15/18 0722)  Resp: 15 (05/15/18 0705)  BP: (!) 175/84 (05/15/18 0427)  SpO2: 97 % (05/15/18 0705) Vital Signs (24h Range):  Temp:  [96.1 °F (35.6 °C)-97.9 °F (36.6 °C)] 97.5 °F (36.4 °C)  Pulse:  [69-87] 69  Resp:  [15-20] 15  SpO2:  [95 %-97 %] 97 %  BP: (160-189)/() 175/84     Weight: 106.5 kg (234 lb 12.8 oz)  Body mass index is 29.35 kg/m².    Intake/Output - Last 3 Shifts       05/13 0700 - 05/14 0659 05/14 0700 - 05/15 0659 05/15 0700 - 05/16 0659    Urine (mL/kg/hr)  600     Total Output   600      Net   -600                   Physical Exam   Constitutional: He appears well-developed and well-nourished. No distress.   HENT:   Head: Normocephalic and atraumatic.   Cardiovascular: Normal rate.    Appears completely paced, no coordinated rhythm   Pulmonary/Chest: Effort normal. No respiratory distress.   Abdominal:   Soft,  non-tender, large, hard, nontender LUQ mass    Genitourinary:   Genitourinary Comments: Carney in place   Neurological: He is alert.   Skin: Skin is warm and dry. He is not diaphoretic.   Psychiatric: He has a normal mood and affect. His behavior is normal.       Significant Labs:  CBC:   Recent Labs  Lab 05/15/18  0452   WBC 6.77   RBC 3.23*   HGB 8.6*   HCT 27.8*      MCV 86   MCH 26.6*   MCHC 30.9*     BMP:   Recent Labs  Lab 05/15/18  0452   *      K 3.9      CO2 22*   BUN 24*   CREATININE 2.5*   CALCIUM 9.3   MG 2.0     CMP:   Recent Labs  Lab 05/15/18  0452   *   CALCIUM 9.3   ALBUMIN 2.1*   PROT 6.7      K 3.9   CO2 22*      BUN 24*   CREATININE 2.5*   ALKPHOS 111   ALT <5*   AST 6*   BILITOT 0.7     LFTs:   Recent Labs  Lab 05/15/18  0452   ALT <5*   AST 6*   ALKPHOS 111   BILITOT 0.7   PROT 6.7   ALBUMIN 2.1*     Coagulation:   Recent Labs  Lab 05/14/18  2051   LABPROT 11.0   INR 1.1   APTT 24.7     ABGs:   Recent Labs  Lab 05/08/18  1426   PH 7.601*   PCO2 21.9*   PO2 97.0   HCO3 21.1*

## 2018-05-15 NOTE — ASSESSMENT & PLAN NOTE
Surgical Risk Assessment   Active cardiac issues:  Active decompensated heart failure? No   Unstable angina?  No   Significant uncontrolled arrhythmias? No   Severe valvular heart disease-Aortic or Mitral Stenosis? No   Recent MI or coronary revascularization < 30 days? No      Cardiac Risk Factors  History of CAD/ischemic heart disease? No   History of cerebrovascular disease? No   History of compensated heart failure? Yes   Type 2 diabetes requiring insulin? No   Serum Creatinine > 2? No   Total cardiac risk factors 1      Functional mets >4  > 4 METs -climbing > 1 flight of stairs without stopping  -walking up hill > 1-2 blocks   MODERATE to EXCELLENT       Revised Cardiac Risk Index (2 predictors = 6.6%)  1-History of congestive heart failure   2-Undergoing abdominal surgery        Assessment/Plan:   Cardiovascular Risk Assessment:     Elective/Urgent/Emergent surgery: urgent  Active cardiac problems: none  Surgery: moderate risk  Functional Status: METs >4      Revised Cardiac Risk Index: 6.6%       Assessment:    Venecia is an 82M with     Recommendation:

## 2018-05-15 NOTE — PLAN OF CARE
Problem: Patient Care Overview  Goal: Plan of Care Review  Outcome: Ongoing (interventions implemented as appropriate)  Pt is AOx4 and injury free for night shift. The pt has calabrese placed for urine incontinence. The pt is on Tele and paced. Nursing will continue to reorient pt and monitor

## 2018-05-15 NOTE — HPI
"Patient is an 83 yo M with history of dementia, RCC, possible colon CA, CKD 4, Afib with pacemaker, CHF with EF 36%, and a large abdominal (likely adrenal) mass who presented to OSH 5/2/18 with pneumonia and during his admission was found to have a large adrenal mass that was enlarged from previous CT scan, and he was transferred here for further workup. A CT scan from 2016 showed a similar sized mass. He underwent biopsy which showed no obvious malignancy, mostly necrotic tissue. He has completed his course of abx for pneumonia and his SOA has resolved.    When asked, he states the mass has been present for years. He denies any nausea, vomiting, weight loss, fevers, or chills. He continues to have bowel movements, with bowel incontinence (he says that's his baseline). He has been off anticoagulation despite being in afib. He has been having issues urinating.    He has undergone right nephrectomy and colon resection of 22 cm "maybe for cancer".  "

## 2018-05-15 NOTE — CONSULTS
"[facility]  Cardiology  Consult Note    Patient Name: Jose Cuadra  MRN: [id number]  Admission Date: 5/14/2018  Hospital Length of Stay: 1 days  Code Status: Full Code   Attending Provider: Vipin Giron MD   Consulting Provider: Efraín Pace MD  Primary Care Physician: Sudhir Long MD  Principal Problem:Non-ischemic cardiomyopathy    Patient information was obtained from patient, past medical records and ER records.     Inpatient consult to Cardiology  Consult performed by: EFRAÍN PACE  Consult ordered by: MORGAN MUSE  Reason for consult: Pre-op eval for abdominal surgery         Subjective:     Chief Complaint:  Pre-op eval for abdominal surgery     HPI:   Patient is an 82M with history of dementia, RCC (s/p R nephrectomy), colon CA (s/p 22" resected), CKD 4, Afib, CAD, pacemaker, HFrEF (35-40%), and a large abdominal (likely adrenal) mass who presented to OSH 5/2/18 with pneumonia and during his admission was found to have a large adrenal mass that was enlarged from previous CT scan, and he was transferred here for further workup.    General surgery is planning on removing the mass, and cardiology was consulted for pre-op clearance. Patient states that he has never had an MI or cardiac disease that he is aware of, but has a pacemaker for reason unknown to patient. States that he sleeps sitting near upright. He has never had LL edema. He states that up until 2 months ago he lived alone, drove, and was quite active. The mass rapid growth has prohibited him from DALs. Patient denies any fevers, night sweats, n/v/d/c, hematuria or hematochezia, cough, wheezing, CP, leg swelling, HA, dizziness, weakness, at this time.      Past Medical History:   Diagnosis Date    Acute on chronic congestive heart failure     Anemia     receiving blood today 03/24/2017    Blindness, right eye, normal vision left eye     Cancer     kidney cancer 2007    Chronic atrial fibrillation 3/21/2018 "    Coronary artery disease     pacemaker 2014    Dementia 5/15/2018    Diabetes mellitus, type 2     Disorder of kidney and ureter     right kidney removed    Encounter for blood transfusion     GERD (gastroesophageal reflux disease)     sometimes heartburn    Hyperlipidemia     Hypertension        Past Surgical History:   Procedure Laterality Date    CARDIAC PACEMAKER PLACEMENT      CARDIAC PACEMAKER PLACEMENT Left 12/09/2014    COLON SURGERY      COLONOSCOPY N/A 3/26/2018    Procedure: COLONOSCOPY;  Surgeon: Jonas Belle MD;  Location: [facility];  Service: Endoscopy;  Laterality: N/A;    KIDNEY SURGERY Right     for cancer       Review of patient's allergies indicates:  No Known Allergies    Current Facility-Administered Medications on File Prior to Encounter   Medication    [DISCONTINUED] acetaminophen tablet 325 mg    [DISCONTINUED] albuterol-ipratropium 2.5mg-0.5mg/3mL nebulizer solution 3 mL    [DISCONTINUED] amLODIPine tablet 5 mg    [DISCONTINUED] carvedilol tablet 25 mg    [DISCONTINUED] cloNIDine tablet 0.3 mg    [DISCONTINUED] dutasteride capsule 0.5 mg    [DISCONTINUED] ferrous sulfate tablet 325 mg    [DISCONTINUED] hydrALAZINE injection 5 mg    [DISCONTINUED] isosorbide-hydrALAZINE 20-37.5 mg per tablet 2 tablet    [DISCONTINUED] memantine tablet 10 mg    [DISCONTINUED] morphine injection 2 mg    [DISCONTINUED] olmesartan tablet 20 mg    [DISCONTINUED] ondansetron injection 4 mg    [DISCONTINUED] pantoprazole EC tablet 40 mg    [DISCONTINUED] polyethylene glycol packet 17 g    [DISCONTINUED] sodium bicarbonate tablet 650 mg     Current Outpatient Prescriptions on File Prior to Encounter   Medication Sig    acetaminophen (TYLENOL) 325 MG tablet Take 1 tablet (325 mg total) by mouth every 6 (six) hours as needed.    albuterol-ipratropium 2.5mg-0.5mg/3mL (DUO-NEB) 0.5 mg-3 mg(2.5 mg base)/3 mL nebulizer solution Take 3 mLs by nebulization every 8 (eight) hours. Rescue     amLODIPine (NORVASC) 5 MG tablet Take 1 tablet (5 mg total) by mouth once daily.    carvedilol (COREG) 25 MG tablet Take 1 tablet (25 mg total) by mouth 2 (two) times daily with meals.    cloNIDine (CATAPRES) 0.3 MG tablet Take 1 tablet (0.3 mg total) by mouth 3 (three) times daily.    dutasteride (AVODART) 0.5 mg capsule Take 0.5 mg by mouth once daily.    ferrous sulfate 325 (65 FE) MG EC tablet Take 1 tablet (325 mg total) by mouth 2 (two) times daily. (Patient taking differently: Take 325 mg by mouth 2 (two) times daily. For 50 days)    hydrALAZINE (APRESOLINE) 20 mg/mL injection Inject 0.25 mLs (5 mg total) into the vein every 6 (six) hours as needed (170mmhg).    isosorbide-hydrALAZINE 20-37.5 mg (BIDIL) 20-37.5 mg Tab Take 2 tablets by mouth 3 (three) times daily.    memantine (NAMENDA) 10 MG Tab Take 10 mg by mouth 2 (two) times daily.     nut.tx.imp.renal fxn,lac-reduc (SUPLENA CARB STEADY) 0.04 gram-1.8 kcal/mL Liqd 1 can tid    olmesartan (BENICAR) 20 MG tablet Take 1 tablet (20 mg total) by mouth once daily.    ondansetron 4 mg/2 mL Soln Inject 4 mg into the vein every 6 (six) hours as needed.    pantoprazole (PROTONIX) 40 MG tablet Take 1 tablet (40 mg total) by mouth once daily.    polyethylene glycol (GLYCOLAX) 17 gram PwPk Take 17 g by mouth once daily.    sodium bicarbonate 650 MG tablet Take 1 tablet (650 mg total) by mouth 3 (three) times daily.     Family History     Problem Relation (Age of Onset)    Cancer Father, Brother        Social History Main Topics    Smoking status: Light Tobacco Smoker    Smokeless tobacco: Never Used      Comment: no cigarettes in 6 weeks approximately    Alcohol use No    Drug use: No    Sexual activity: No     Review of Systems   Constitution: Negative for chills and fever.   HENT: Negative for congestion and stridor.    Eyes: Negative for double vision.   Cardiovascular: Positive for dyspnea on exertion and orthopnea. Negative for chest pain,  irregular heartbeat, leg swelling, palpitations and syncope.   Respiratory: Positive for shortness of breath. Negative for hemoptysis.    Musculoskeletal: Negative for myalgias.   Gastrointestinal: Positive for abdominal pain. Negative for jaundice, nausea and vomiting.   Genitourinary: Positive for incomplete emptying.   Psychiatric/Behavioral: Negative for altered mental status and hallucinations.     Objective:     Vital Signs (Most Recent):  Temp: 98.5 °F (36.9 °C) (05/15/18 1142)  Pulse: 72 (05/15/18 1142)  Resp: 18 (05/15/18 1142)  BP: (!) 184/93 (05/15/18 1142)  SpO2: 96 % (05/15/18 1142) Vital Signs (24h Range):  Temp:  [96.1 °F (35.6 °C)-98.5 °F (36.9 °C)] 98.5 °F (36.9 °C)  Pulse:  [69-87] 72  Resp:  [15-20] 18  SpO2:  [95 %-97 %] 96 %  BP: (160-184)/(81-93) 184/93     Weight: 106.5 kg (234 lb 12.8 oz)  Body mass index is 29.35 kg/m².    SpO2: 96 %  O2 Device (Oxygen Therapy): room air      Intake/Output Summary (Last 24 hours) at 05/15/18 1519  Last data filed at 05/15/18 0753   Gross per 24 hour   Intake                0 ml   Output              900 ml   Net             -900 ml       Lines/Drains/Airways     Drain                 Urethral Catheter 05/14/18 1920 less than 1 day          Peripheral Intravenous Line                 Peripheral IV - Single Lumen 05/10/18 0000 Right Forearm 5 days                Physical Exam   Constitutional: He appears well-developed and well-nourished. No distress.   HENT:   Head: Normocephalic and atraumatic.   Eyes: Pupils are equal, round, and reactive to light. No scleral icterus.   Neck: Normal range of motion. Neck supple. No JVD present.   Cardiovascular: Normal rate.    Murmur (2/6 systolic murmur HB@L4ICS) heard.  Paced at 70bpm   Pulmonary/Chest: Effort normal. No stridor. No respiratory distress. He has no wheezes. He has rales (L>R).   GI: Large distention of entire abdomen. Tenderness elicited on ULQ.   Irregular 3cm mass in upper RIGHT quadrant.  Skin: He is  not diaphoretic.   Nursing note and vitals reviewed.      Significant Labs:    Recent Results (from the past 24 hour(s))   Hemoglobin A1c if not done in past 6 weeks    Collection Time: 05/14/18  8:51 PM   Result Value Ref Range    Hemoglobin A1C 5.9 (H) 4.0 - 5.6 %    Estimated Avg Glucose 123 68 - 131 mg/dL   Comprehensive metabolic panel    Collection Time: 05/14/18  8:51 PM   Result Value Ref Range    Sodium 136 136 - 145 mmol/L    Potassium 4.3 3.5 - 5.1 mmol/L    Chloride 105 95 - 110 mmol/L    CO2 26 23 - 29 mmol/L    Glucose 141 (H) 70 - 110 mg/dL    BUN, Bld 25 (H) 8 - 23 mg/dL    Creatinine 2.7 (H) 0.5 - 1.4 mg/dL    Calcium 9.5 8.7 - 10.5 mg/dL    Total Protein 6.7 6.0 - 8.4 g/dL    Albumin 2.0 (L) 3.5 - 5.2 g/dL    Total Bilirubin 0.7 0.1 - 1.0 mg/dL    Alkaline Phosphatase 109 55 - 135 U/L    AST 6 (L) 10 - 40 U/L    ALT <5 (L) 10 - 44 U/L    Anion Gap 5 (L) 8 - 16 mmol/L    eGFR if African American 24.6 (A) >60 mL/min/1.73 m^2    eGFR if non  21.3 (A) >60 mL/min/1.73 m^2   Magnesium    Collection Time: 05/14/18  8:51 PM   Result Value Ref Range    Magnesium 1.8 1.6 - 2.6 mg/dL   Phosphorus    Collection Time: 05/14/18  8:51 PM   Result Value Ref Range    Phosphorus 2.7 2.7 - 4.5 mg/dL   CBC auto differential    Collection Time: 05/14/18  8:51 PM   Result Value Ref Range    WBC 7.25 3.90 - 12.70 K/uL    RBC 3.19 (L) 4.60 - 6.20 M/uL    Hemoglobin 8.6 (L) 14.0 - 18.0 g/dL    Hematocrit 27.6 (L) 40.0 - 54.0 %    MCV 87 82 - 98 fL    MCH 27.0 27.0 - 31.0 pg    MCHC 31.2 (L) 32.0 - 36.0 g/dL    RDW 19.9 (H) 11.5 - 14.5 %    Platelets 216 150 - 350 K/uL    MPV 11.2 9.2 - 12.9 fL    Immature Granulocytes 0.7 (H) 0.0 - 0.5 %    Gran # (ANC) 5.5 1.8 - 7.7 K/uL    Immature Grans (Abs) 0.05 (H) 0.00 - 0.04 K/uL    Lymph # 0.9 (L) 1.0 - 4.8 K/uL    Mono # 0.7 0.3 - 1.0 K/uL    Eos # 0.1 0.0 - 0.5 K/uL    Baso # 0.04 0.00 - 0.20 K/uL    nRBC 0 0 /100 WBC    Gran% 76.4 (H) 38.0 - 73.0 %    Lymph%  11.7 (L) 18.0 - 48.0 %    Mono% 9.1 4.0 - 15.0 %    Eosinophil% 1.5 0.0 - 8.0 %    Basophil% 0.6 0.0 - 1.9 %    Differential Method Automated    Protime-INR    Collection Time: 05/14/18  8:51 PM   Result Value Ref Range    Prothrombin Time 11.0 9.0 - 12.5 sec    INR 1.1 0.8 - 1.2   APTT    Collection Time: 05/14/18  8:51 PM   Result Value Ref Range    aPTT 24.7 21.0 - 32.0 sec   Anti-Xa Heparin Monitoring    Collection Time: 05/14/18 11:47 PM   Result Value Ref Range    Heparin Anti-Xa <0.10 (L) 0.30 - 0.70 IU/mL   CBC auto differential    Collection Time: 05/15/18  4:52 AM   Result Value Ref Range    WBC 6.77 3.90 - 12.70 K/uL    RBC 3.23 (L) 4.60 - 6.20 M/uL    Hemoglobin 8.6 (L) 14.0 - 18.0 g/dL    Hematocrit 27.8 (L) 40.0 - 54.0 %    MCV 86 82 - 98 fL    MCH 26.6 (L) 27.0 - 31.0 pg    MCHC 30.9 (L) 32.0 - 36.0 g/dL    RDW 19.8 (H) 11.5 - 14.5 %    Platelets 235 150 - 350 K/uL    MPV 12.0 9.2 - 12.9 fL    Immature Granulocytes 0.6 (H) 0.0 - 0.5 %    Gran # (ANC) 5.1 1.8 - 7.7 K/uL    Immature Grans (Abs) 0.04 0.00 - 0.04 K/uL    Lymph # 0.9 (L) 1.0 - 4.8 K/uL    Mono # 0.6 0.3 - 1.0 K/uL    Eos # 0.1 0.0 - 0.5 K/uL    Baso # 0.04 0.00 - 0.20 K/uL    nRBC 0 0 /100 WBC    Gran% 75.3 (H) 38.0 - 73.0 %    Lymph% 12.7 (L) 18.0 - 48.0 %    Mono% 8.9 4.0 - 15.0 %    Eosinophil% 1.9 0.0 - 8.0 %    Basophil% 0.6 0.0 - 1.9 %    Differential Method Automated    Comprehensive metabolic panel    Collection Time: 05/15/18  4:52 AM   Result Value Ref Range    Sodium 138 136 - 145 mmol/L    Potassium 3.9 3.5 - 5.1 mmol/L    Chloride 106 95 - 110 mmol/L    CO2 22 (L) 23 - 29 mmol/L    Glucose 129 (H) 70 - 110 mg/dL    BUN, Bld 24 (H) 8 - 23 mg/dL    Creatinine 2.5 (H) 0.5 - 1.4 mg/dL    Calcium 9.3 8.7 - 10.5 mg/dL    Total Protein 6.7 6.0 - 8.4 g/dL    Albumin 2.1 (L) 3.5 - 5.2 g/dL    Total Bilirubin 0.7 0.1 - 1.0 mg/dL    Alkaline Phosphatase 111 55 - 135 U/L    AST 6 (L) 10 - 40 U/L    ALT <5 (L) 10 - 44 U/L    Anion Gap 10 8  - 16 mmol/L    eGFR if African American 27.0 (A) >60 mL/min/1.73 m^2    eGFR if non  23.4 (A) >60 mL/min/1.73 m^2   Magnesium    Collection Time: 05/15/18  4:52 AM   Result Value Ref Range    Magnesium 2.0 1.6 - 2.6 mg/dL   Phosphorus    Collection Time: 05/15/18  4:52 AM   Result Value Ref Range    Phosphorus 2.9 2.7 - 4.5 mg/dL   Anti-Xa Heparin Monitoring    Collection Time: 05/15/18  4:52 AM   Result Value Ref Range    Heparin Anti-Xa 0.20 (L) 0.30 - 0.70 IU/mL   Anti-Xa Heparin Monitoring    Collection Time: 05/15/18  1:52 PM   Result Value Ref Range    Heparin Anti-Xa 0.32 0.30 - 0.70 IU/mL       Microbiology Results (last 7 days)     ** No results found for the last 168 hours. **            Significant Imaging:     Imaging Results    None       CXR on presented originally on 5/2/18:   Heterogeneous opacification right mid and right lower lung zones, may reflect edema or pneumonia.    Last ECHO (bubble) was 3/22/18:  Final Impressions  1. The study quality is good.   2. Global left ventricular systolic function is moderately decreased.   The left ventricular ejection fraction is 36%.   3. Left ventricular diastolic function is indeterminate.   4. Left ventricular diastolic septal thickness is 1.8 cms. Left   ventricular diastolic postero basal free wall thickness is 1.8 cms.   Noted left ventricular hypertrophy. It is moderate.   5. Patient appears to have Moderate aortic valve stenosis. JOON (VTI)   is 1.58 cm^2.   6. Mild calcification of the aortic valve is noted with reduced cuspal   excursion. Mild mitral annular calcification is noted.   7. The left atrium is moderately enlarged. Left atrial diameter is 5.1   cms.    8. Mild to moderate (1-2+) tricuspid regurgitation. Mild (1+) mitral   regurgitation.    9. No bubbles were visualized in the left heart chambers at rest or   with valsalva. The bubble study was negative, ruling out patent   foramen ovale.    10. Subcostals were not  obtained.  11. Patient has a pacemaker.  12. The pulmonary artery systolic pressure is 39 mmHg.     Assessment and Plan:     * Pre-op eval        Surgical Risk Assessment   Active cardiac issues:  Active decompensated heart failure? No   Unstable angina?  No   Significant uncontrolled arrhythmias? No   Severe valvular heart disease-Aortic or Mitral Stenosis? No   Recent MI or coronary revascularization < 30 days? No      Cardiac Risk Factors  History of CAD/ischemic heart disease? Yes   History of cerebrovascular disease? No   History of compensated heart failure? Yes   Type 2 diabetes requiring insulin? No   Serum Creatinine > 2? Yes     Total cardiac risk factors   3          Functional mets >4  > 4 METs -climbing > 1 flight of stairs without stopping  -walking up hill > 1-2 blocks   MODERATE to EXCELLENT       Revised Cardiac Risk Index  (3 predictors = 11%)  1-History of congestive heart failure   2-Cr>2  3-CAD    *Will not count intra-peritoneal surgery, since this is a retroperitoneal mass. Please take increased risk into account, if surgical necessity changes.     Assessment/Plan:   Cardiovascular Risk Assessment:     Elective/Urgent/Emergent surgery: urgent  Active cardiac problems: none     Total cardiac risk factors: 3  Surgery: moderate risk  Functional Status: METs >4      Revised Cardiac Risk Index: 11%        NSQIP:    Outcomes                     Patient Risk   Avg  Serious Complication             13.6%  13.1% Average  Any Complication                     15.3%  15.0% Average  Pneumonia                                2.8%     2.1% Above Average  Cardiac Complication              1.1%    0.5% Above Average  Surgical Site Infection             2.9%    3.6% Below Average  Urinary Tract Infection             2.3%   1.8% Above Average  Venous Thromboembolism     3.9%    3.0% Above Average  Renal Failure                             0.6%    0.4% Above Average  Readmission                             9.2%     8.6% Average  Return to OR                            1.4%    1.6% Below Average  Death                                         0.4%    0.4% Below Average  Discharge to Nursing   or Rehab Facility                      6.0%     2.5% Above Average    Predicted Length    of Hospital Stay: 6 days      Patient is having SOB and pain as a result of the mass on his diaphragm. In addition, 's. Patient in need of mass removal; Cardiology would do nothing more to prep this patient for said surgery.                                    VTE Risk Mitigation         Ordered     heparin 25,000 units in dextrose 5% 250 mL (100 units/mL) infusion  Continuous      05/14/18 2219     heparin 25,000 units in dextrose 5% 250 mL (100 units/mL) bolus from bag; ADDITIONAL PRN BOLUS  As needed (PRN)      05/14/18 2219     heparin 25,000 units in dextrose 5% 250 mL (100 units/mL) bolus from bag; ADDITIONAL PRN BOLUS  As needed (PRN)      05/14/18 2219     Place BRUCE hose  Until discontinued      05/14/18 1954     Place sequential compression device  Until discontinued      05/14/18 1954     IP VTE HIGH RISK PATIENT  Once      05/14/18 1954          Thank you for your consult. I will sign off. Please contact us if you have any additional questions.    Michael Pace MD  Cardiology   [facility]

## 2018-05-15 NOTE — PROGRESS NOTES
"[facility]  General Surgery  Progress Note    Subjective:     History of Present Illness:  Patient is an 83 yo M with history of dementia, RCC, possible colon CA, CKD 4, Afib with pacemaker, CHF with EF 36%, and a large abdominal (likely adrenal) mass who presented to OSH 5/2/18 with pneumonia and during his admission was found to have a large adrenal mass that was enlarged from previous CT scan, and he was transferred here for further workup. A CT scan from 2016 showed a similar sized mass. He underwent biopsy which showed no obvious malignancy, mostly necrotic tissue. He has completed his course of abx for pneumonia and his SOA has resolved.    When asked, he states the mass has been present for years. He denies any nausea, vomiting, weight loss, fevers, or chills. He continues to have bowel movements, with bowel incontinence (he says that's his baseline). He has been off anticoagulation despite being in afib. He has been having issues urinating.    He has undergone right nephrectomy and colon resection of 22 cm "maybe for cancer".    Post-Op Info:  * No surgery found *         Interval History:   Patient seen and examined, no acute events overnight  Tolerating minimal diet  +F/BM  Afebrile/baseline hypertensive    Medications:  Continuous Infusions:   heparin (porcine) in D5W 17 Units/kg/hr (05/15/18 0013)     Scheduled Meds:   albuterol-ipratropium 2.5mg-0.5mg/3mL  3 mL Nebulization Q8H    amLODIPine  5 mg Oral Daily    carvedilol  25 mg Oral BID WM    cloNIDine  0.3 mg Oral TID    dutasteride  0.5 mg Oral Daily    ferrous sulfate  325 mg Oral BID    isosorbide-hydrALAZINE 20-37.5 mg  2 tablet Oral TID    memantine  10 mg Oral BID    pantoprazole  40 mg Oral Daily    polyethylene glycol  17 g Oral Daily    sodium bicarbonate  1 tablet Oral TID     PRN Meds:acetaminophen, dextrose 50%, dextrose 50%, diphenhydrAMINE, glucagon (human recombinant), glucose, glucose, heparin (PORCINE), " heparin (PORCINE), insulin aspart U-100, ondansetron, promethazine (PHENERGAN) IVPB, sodium chloride 0.9%     Review of patient's allergies indicates:  No Known Allergies  Objective:     Vital Signs (Most Recent):  Temp: 97.5 °F (36.4 °C) (05/15/18 0427)  Pulse: 69 (05/15/18 0722)  Resp: 15 (05/15/18 0705)  BP: (!) 175/84 (05/15/18 0427)  SpO2: 97 % (05/15/18 0705) Vital Signs (24h Range):  Temp:  [96.1 °F (35.6 °C)-97.9 °F (36.6 °C)] 97.5 °F (36.4 °C)  Pulse:  [69-87] 69  Resp:  [15-20] 15  SpO2:  [95 %-97 %] 97 %  BP: (160-189)/() 175/84     Weight: 106.5 kg (234 lb 12.8 oz)  Body mass index is 29.35 kg/m².    Intake/Output - Last 3 Shifts       05/13 0700 - 05/14 0659 05/14 0700 - 05/15 0659 05/15 0700 - 05/16 0659    Urine (mL/kg/hr)  600     Total Output   600      Net   -600                   Physical Exam   Constitutional: He appears well-developed and well-nourished. No distress.   HENT:   Head: Normocephalic and atraumatic.   Cardiovascular: Normal rate.    Appears completely paced, no coordinated rhythm   Pulmonary/Chest: Effort normal. No respiratory distress.   Abdominal:   Soft, non-tender, large, hard, nontender LUQ mass    Genitourinary:   Genitourinary Comments: Carney in place   Neurological: He is alert.   Skin: Skin is warm and dry. He is not diaphoretic.   Psychiatric: He has a normal mood and affect. His behavior is normal.       Significant Labs:  CBC:   Recent Labs  Lab 05/15/18  0452   WBC 6.77   RBC 3.23*   HGB 8.6*   HCT 27.8*      MCV 86   MCH 26.6*   MCHC 30.9*     BMP:   Recent Labs  Lab 05/15/18  0452   *      K 3.9      CO2 22*   BUN 24*   CREATININE 2.5*   CALCIUM 9.3   MG 2.0     CMP:   Recent Labs  Lab 05/15/18  0452   *   CALCIUM 9.3   ALBUMIN 2.1*   PROT 6.7      K 3.9   CO2 22*      BUN 24*   CREATININE 2.5*   ALKPHOS 111   ALT <5*   AST 6*   BILITOT 0.7     LFTs:   Recent Labs  Lab 05/15/18  0452   ALT <5*   AST 6*   ALKPHOS 111    BILITOT 0.7   PROT 6.7   ALBUMIN 2.1*     Coagulation:   Recent Labs  Lab 05/14/18  2051   LABPROT 11.0   INR 1.1   APTT 24.7     ABGs:   Recent Labs  Lab 05/08/18  1426   PH 7.601*   PCO2 21.9*   PO2 97.0   HCO3 21.1*     Assessment/Plan:     * Adrenal tumor    Patient is 81 yo M who presents with large necrotic left adrenal mass    -continue CLD  -pain/nausea meds PRN  -bowel regimen - miralax  -f/u urine studies  -DVT/GI prophylaxis  -PT/OT  -will discuss timing of possible surgical intervention        Dementia    Home meds  -memantine        Urinary retention    Home meds  -dutasteride  -calabrese        Non-ischemic cardiomyopathy    Home meds  -amlodipine, clonidine, carvedilol, bidil  -cards clearance prior to surgery        Type 2 diabetes mellitus with diabetic peripheral angiopathy without gangrene, without long-term current use of insulin    SSI, accuchecks        Microcytic anemia    Iron supplement        Chronic atrial fibrillation    Tele  Heparin gtt  Cards clearance prior to possible surgical intervention            Lexi Tolentino PA-C   [id number]  General Surgery  [facility]

## 2018-05-15 NOTE — H&P
"[facility]  General Surgery  History & Physical    Patient Name: Jose Cuadra  MRN: [id number]  Admission Date: 5/14/2018  Attending Physician: Vipin Giron MD   Primary Care Provider: Sudhir Long MD    Patient information was obtained from patient, past medical records and ER records.     Subjective:     Chief Complaint/Reason for Admission: Left adrenal mass    History of Present Illness: Patient is an 83 yo M with history of dementia, RCC, possible colon CA, CKD 4, Afib with pacemaker, CHF with EF 36%, and a large abdominal (likely adrenal) mass who presented to OSH 5/2/18 with pneumonia and during his admission was found to have a large adrenal mass that was enlarged from previous CT scan, and he was transferred here for further workup. A CT scan from 2016 showed a similar sized mass. He underwent biopsy which showed no obvious malignancy, mostly necrotic tissue. He has completed his course of abx for pneumonia and his SOA has resolved.    When asked, he states the mass has been present for years. He denies any nausea, vomiting, weight loss, fevers, or chills. He continues to have bowel movements, with bowel incontinence (he says that's his baseline). He has been off anticoagulation despite being in afib. He has been having issues urinating.    He has undergone right nephrectomy and colon resection of 22 cm "maybe for cancer".    Current Facility-Administered Medications on File Prior to Encounter   Medication    [DISCONTINUED] acetaminophen tablet 325 mg    [DISCONTINUED] albuterol-ipratropium 2.5mg-0.5mg/3mL nebulizer solution 3 mL    [DISCONTINUED] amLODIPine tablet 5 mg    [DISCONTINUED] carvedilol tablet 25 mg    [DISCONTINUED] cloNIDine tablet 0.3 mg    [DISCONTINUED] dutasteride capsule 0.5 mg    [DISCONTINUED] ferrous sulfate tablet 325 mg    [DISCONTINUED] hydrALAZINE injection 5 mg    [DISCONTINUED] isosorbide-hydrALAZINE 20-37.5 mg per tablet 2 tablet    " [DISCONTINUED] memantine tablet 10 mg    [DISCONTINUED] morphine injection 2 mg    [DISCONTINUED] olmesartan tablet 20 mg    [DISCONTINUED] ondansetron injection 4 mg    [DISCONTINUED] pantoprazole EC tablet 40 mg    [DISCONTINUED] polyethylene glycol packet 17 g    [DISCONTINUED] sodium bicarbonate tablet 650 mg     Current Outpatient Prescriptions on File Prior to Encounter   Medication Sig    acetaminophen (TYLENOL) 325 MG tablet Take 1 tablet (325 mg total) by mouth every 6 (six) hours as needed.    albuterol-ipratropium 2.5mg-0.5mg/3mL (DUO-NEB) 0.5 mg-3 mg(2.5 mg base)/3 mL nebulizer solution Take 3 mLs by nebulization every 8 (eight) hours. Rescue    [START ON 5/15/2018] amLODIPine (NORVASC) 5 MG tablet Take 1 tablet (5 mg total) by mouth once daily.    carvedilol (COREG) 25 MG tablet Take 1 tablet (25 mg total) by mouth 2 (two) times daily with meals.    cloNIDine (CATAPRES) 0.3 MG tablet Take 1 tablet (0.3 mg total) by mouth 3 (three) times daily.    dutasteride (AVODART) 0.5 mg capsule Take 0.5 mg by mouth once daily.    ferrous sulfate 325 (65 FE) MG EC tablet Take 1 tablet (325 mg total) by mouth 2 (two) times daily. (Patient taking differently: Take 325 mg by mouth 2 (two) times daily. For 50 days)    hydrALAZINE (APRESOLINE) 20 mg/mL injection Inject 0.25 mLs (5 mg total) into the vein every 6 (six) hours as needed (170mmhg).    isosorbide-hydrALAZINE 20-37.5 mg (BIDIL) 20-37.5 mg Tab Take 2 tablets by mouth 3 (three) times daily.    memantine (NAMENDA) 10 MG Tab Take 10 mg by mouth 2 (two) times daily.     nut.tx.imp.renal fxn,lac-reduc (SUPLENA CARB STEADY) 0.04 gram-1.8 kcal/mL Liqd 1 can tid    olmesartan (BENICAR) 20 MG tablet Take 1 tablet (20 mg total) by mouth once daily.    ondansetron 4 mg/2 mL Soln Inject 4 mg into the vein every 6 (six) hours as needed.    [START ON 5/15/2018] pantoprazole (PROTONIX) 40 MG tablet Take 1 tablet (40 mg total) by mouth once daily.     [START ON 5/15/2018] polyethylene glycol (GLYCOLAX) 17 gram PwPk Take 17 g by mouth once daily.    sodium bicarbonate 650 MG tablet Take 1 tablet (650 mg total) by mouth 3 (three) times daily.    [DISCONTINUED] carvedilol (COREG) 25 MG tablet Take 25 mg by mouth 2 (two) times daily with meals.    [DISCONTINUED] cloNIDine (CATAPRES) 0.1 MG tablet Take 1 tablet (0.1 mg total) by mouth nightly. (Patient taking differently: Take 0.1 mg by mouth 2 (two) times daily. )    [DISCONTINUED] hydrALAZINE (APRESOLINE) 50 MG tablet Take 50 mg by mouth 3 (three) times daily.     [DISCONTINUED] sodium bicarbonate 650 MG tablet Take 2 tablets (1,300 mg total) by mouth 3 (three) times daily.       Review of patient's allergies indicates:  No Known Allergies    Past Medical History:   Diagnosis Date    Acute on chronic congestive heart failure     Anemia     receiving blood today 03/24/2017    Blindness, right eye, normal vision left eye     Cancer     kidney cancer 2007    Chronic atrial fibrillation 3/21/2018    Coronary artery disease     pacemaker 2014    Diabetes mellitus, type 2     Disorder of kidney and ureter     right kidney removed    Encounter for blood transfusion     GERD (gastroesophageal reflux disease)     sometimes heartburn    Hyperlipidemia     Hypertension      Past Surgical History:   Procedure Laterality Date    CARDIAC PACEMAKER PLACEMENT      CARDIAC PACEMAKER PLACEMENT Left 12/09/2014    COLON SURGERY      COLONOSCOPY N/A 3/26/2018    Procedure: COLONOSCOPY;  Surgeon: Jonas Belle MD;  [facility];  Service: Endoscopy;  Laterality: N/A;    KIDNEY SURGERY Right     for cancer     Family History     Problem Relation (Age of Onset)    Cancer Father, Brother        Social History Main Topics    Smoking status: Light Tobacco Smoker    Smokeless tobacco: Never Used      Comment: no cigarettes in 6 weeks approximately    Alcohol use No    Drug use: No    Sexual activity: No      Review of Systems   Constitutional: Negative for chills, diaphoresis, fatigue, fever and unexpected weight change.   HENT: Negative for trouble swallowing.    Respiratory: Negative for cough and shortness of breath.    Cardiovascular: Negative for chest pain and leg swelling.   Gastrointestinal: Positive for abdominal distention and abdominal pain. Negative for blood in stool, constipation, diarrhea, nausea and vomiting.   Genitourinary: Positive for difficulty urinating. Negative for dysuria.   Musculoskeletal: Negative for arthralgias and back pain.   Skin: Negative for color change and wound.   Allergic/Immunologic: Negative for immunocompromised state.   Neurological: Negative for light-headedness.   Hematological: Negative for adenopathy.     Objective:     Vital Signs (Most Recent):  Temp: 96.1 °F (35.6 °C) (05/14/18 2110)  Pulse: 70 (05/14/18 2112)  Resp: 20 (05/14/18 2110)  BP: (!) 166/81 (05/14/18 2112)  SpO2: 96 % (05/14/18 2110) Vital Signs (24h Range):  Temp:  [95.9 °F (35.5 °C)-97.7 °F (36.5 °C)] 96.1 °F (35.6 °C)  Pulse:  [68-72] 70  Resp:  [17-20] 20  SpO2:  [96 %-99 %] 96 %  BP: (160-189)/() 166/81     Weight: 106.5 kg (234 lb 12.8 oz)  Body mass index is 29.35 kg/m².    Physical Exam   Constitutional: He appears well-developed and well-nourished. No distress.   HENT:   Head: Normocephalic and atraumatic.   Eyes: EOM are normal. Pupils are equal, round, and reactive to light.   Neck: Normal range of motion. Neck supple.   Cardiovascular: Normal rate.    Appears completely paced, no coordinated rhythm   Pulmonary/Chest: Effort normal. No respiratory distress.   Abdominal: Soft. He exhibits mass. He exhibits no distension. There is no tenderness. There is no guarding (Large, hard, nontender LUQ mass).   Genitourinary:   Genitourinary Comments: Carney in place   Musculoskeletal: Normal range of motion.   Neurological: He is alert.   Skin: Skin is warm and dry. He is not diaphoretic.    Psychiatric: He has a normal mood and affect. His behavior is normal.   Nursing note and vitals reviewed.      Significant Labs:  CBC:   Recent Labs  Lab 05/14/18 2051   WBC 7.25   RBC 3.19*   HGB 8.6*   HCT 27.6*      MCV 87   MCH 27.0   MCHC 31.2*     CMP:   Recent Labs  Lab 05/14/18 2051   *   CALCIUM 9.5   ALBUMIN 2.0*   PROT 6.7      K 4.3   CO2 26      BUN 25*   CREATININE 2.7*   ALKPHOS 109   ALT <5*   AST 6*   BILITOT 0.7     Path  Adrenal Bx 3/29/18:  The mass shows a 3-4mm thick fibrous capsule with some hemosiderin  deposition. The next 5mm consists of hemorrhage with some organization  and slight acute inflammation. The deep portion of the biopsy shows  necrosis. No diagnostic evidence of malignancy is identified.    Significant Diagnostics:  I have reviewed all pertinent imaging results/findings within the past 24 hours.   CT: Significant increase in size of the mass when compared to CT from 2016  in the region of the left adrenal gland which pushes the bowel to the right and left kidney posteriorly measuring 20 x 17 x 22 cm..  This could represent a slow growing malignancy which is of concern versus hemorrhage into a mass.  Consider rebiopsy if clinically indicated    Assessment/Plan:     * Adrenal tumor    Patient is 81 yo M who presents with large necrotic left adrenal mass    Admit to general surgery  CLD  Sliding scale insulin  Tele  Heparin gtt for afib  Daily labs, replete lytes as needed  Home meds  Upon arrival patient voided approx 50 mL with post void residual being 700+, calabrese was placed at that time.          VTE Risk Mitigation         Ordered     heparin 25,000 units in dextrose 5% 250 mL (100 units/mL) infusion  Continuous      05/14/18 2219     heparin 25,000 units in dextrose 5% 250 mL (100 units/mL) bolus from bag; ADDITIONAL PRN BOLUS  As needed (PRN)      05/14/18 2219     heparin 25,000 units in dextrose 5% 250 mL (100 units/mL) bolus from bag;  ADDITIONAL PRN BOLUS  As needed (PRN)      05/14/18 2219     Place BRUCE hose  Until discontinued      05/14/18 1954     Place sequential compression device  Until discontinued      05/14/18 1954     IP VTE HIGH RISK PATIENT  Once      05/14/18 1954          Rafy Talley MD  General Surgery  [facility]     I have personally performed a detailed history and physical examination on this patient. My findings are summarized in the resident's note included in the record.

## 2018-05-15 NOTE — SUBJECTIVE & OBJECTIVE
Current Facility-Administered Medications on File Prior to Encounter   Medication    [DISCONTINUED] acetaminophen tablet 325 mg    [DISCONTINUED] albuterol-ipratropium 2.5mg-0.5mg/3mL nebulizer solution 3 mL    [DISCONTINUED] amLODIPine tablet 5 mg    [DISCONTINUED] carvedilol tablet 25 mg    [DISCONTINUED] cloNIDine tablet 0.3 mg    [DISCONTINUED] dutasteride capsule 0.5 mg    [DISCONTINUED] ferrous sulfate tablet 325 mg    [DISCONTINUED] hydrALAZINE injection 5 mg    [DISCONTINUED] isosorbide-hydrALAZINE 20-37.5 mg per tablet 2 tablet    [DISCONTINUED] memantine tablet 10 mg    [DISCONTINUED] morphine injection 2 mg    [DISCONTINUED] olmesartan tablet 20 mg    [DISCONTINUED] ondansetron injection 4 mg    [DISCONTINUED] pantoprazole EC tablet 40 mg    [DISCONTINUED] polyethylene glycol packet 17 g    [DISCONTINUED] sodium bicarbonate tablet 650 mg     Current Outpatient Prescriptions on File Prior to Encounter   Medication Sig    acetaminophen (TYLENOL) 325 MG tablet Take 1 tablet (325 mg total) by mouth every 6 (six) hours as needed.    albuterol-ipratropium 2.5mg-0.5mg/3mL (DUO-NEB) 0.5 mg-3 mg(2.5 mg base)/3 mL nebulizer solution Take 3 mLs by nebulization every 8 (eight) hours. Rescue    [START ON 5/15/2018] amLODIPine (NORVASC) 5 MG tablet Take 1 tablet (5 mg total) by mouth once daily.    carvedilol (COREG) 25 MG tablet Take 1 tablet (25 mg total) by mouth 2 (two) times daily with meals.    cloNIDine (CATAPRES) 0.3 MG tablet Take 1 tablet (0.3 mg total) by mouth 3 (three) times daily.    dutasteride (AVODART) 0.5 mg capsule Take 0.5 mg by mouth once daily.    ferrous sulfate 325 (65 FE) MG EC tablet Take 1 tablet (325 mg total) by mouth 2 (two) times daily. (Patient taking differently: Take 325 mg by mouth 2 (two) times daily. For 50 days)    hydrALAZINE (APRESOLINE) 20 mg/mL injection Inject 0.25 mLs (5 mg total) into the vein every 6 (six) hours as needed (170mmhg).     isosorbide-hydrALAZINE 20-37.5 mg (BIDIL) 20-37.5 mg Tab Take 2 tablets by mouth 3 (three) times daily.    memantine (NAMENDA) 10 MG Tab Take 10 mg by mouth 2 (two) times daily.     nut.tx.imp.renal fxn,lac-reduc (SUPLENA CARB STEADY) 0.04 gram-1.8 kcal/mL Liqd 1 can tid    olmesartan (BENICAR) 20 MG tablet Take 1 tablet (20 mg total) by mouth once daily.    ondansetron 4 mg/2 mL Soln Inject 4 mg into the vein every 6 (six) hours as needed.    [START ON 5/15/2018] pantoprazole (PROTONIX) 40 MG tablet Take 1 tablet (40 mg total) by mouth once daily.    [START ON 5/15/2018] polyethylene glycol (GLYCOLAX) 17 gram PwPk Take 17 g by mouth once daily.    sodium bicarbonate 650 MG tablet Take 1 tablet (650 mg total) by mouth 3 (three) times daily.    [DISCONTINUED] carvedilol (COREG) 25 MG tablet Take 25 mg by mouth 2 (two) times daily with meals.    [DISCONTINUED] cloNIDine (CATAPRES) 0.1 MG tablet Take 1 tablet (0.1 mg total) by mouth nightly. (Patient taking differently: Take 0.1 mg by mouth 2 (two) times daily. )    [DISCONTINUED] hydrALAZINE (APRESOLINE) 50 MG tablet Take 50 mg by mouth 3 (three) times daily.     [DISCONTINUED] sodium bicarbonate 650 MG tablet Take 2 tablets (1,300 mg total) by mouth 3 (three) times daily.       Review of patient's allergies indicates:  No Known Allergies    Past Medical History:   Diagnosis Date    Acute on chronic congestive heart failure     Anemia     receiving blood today 03/24/2017    Blindness, right eye, normal vision left eye     Cancer     kidney cancer 2007    Chronic atrial fibrillation 3/21/2018    Coronary artery disease     pacemaker 2014    Diabetes mellitus, type 2     Disorder of kidney and ureter     right kidney removed    Encounter for blood transfusion     GERD (gastroesophageal reflux disease)     sometimes heartburn    Hyperlipidemia     Hypertension      Past Surgical History:   Procedure Laterality Date    CARDIAC PACEMAKER PLACEMENT       CARDIAC PACEMAKER PLACEMENT Left 12/09/2014    COLON SURGERY      COLONOSCOPY N/A 3/26/2018    Procedure: COLONOSCOPY;  Surgeon: Jonas Belle MD;  Location: [facility];  Service: Endoscopy;  Laterality: N/A;    KIDNEY SURGERY Right     for cancer     Family History     Problem Relation (Age of Onset)    Cancer Father, Brother        Social History Main Topics    Smoking status: Light Tobacco Smoker    Smokeless tobacco: Never Used      Comment: no cigarettes in 6 weeks approximately    Alcohol use No    Drug use: No    Sexual activity: No     Review of Systems   Constitutional: Negative for chills, diaphoresis, fatigue, fever and unexpected weight change.   HENT: Negative for trouble swallowing.    Respiratory: Negative for cough and shortness of breath.    Cardiovascular: Negative for chest pain and leg swelling.   Gastrointestinal: Positive for abdominal distention and abdominal pain. Negative for blood in stool, constipation, diarrhea, nausea and vomiting.   Genitourinary: Positive for difficulty urinating. Negative for dysuria.   Musculoskeletal: Negative for arthralgias and back pain.   Skin: Negative for color change and wound.   Allergic/Immunologic: Negative for immunocompromised state.   Neurological: Negative for light-headedness.   Hematological: Negative for adenopathy.     Objective:     Vital Signs (Most Recent):  Temp: 96.1 °F (35.6 °C) (05/14/18 2110)  Pulse: 70 (05/14/18 2112)  Resp: 20 (05/14/18 2110)  BP: (!) 166/81 (05/14/18 2112)  SpO2: 96 % (05/14/18 2110) Vital Signs (24h Range):  Temp:  [95.9 °F (35.5 °C)-97.7 °F (36.5 °C)] 96.1 °F (35.6 °C)  Pulse:  [68-72] 70  Resp:  [17-20] 20  SpO2:  [96 %-99 %] 96 %  BP: (160-189)/() 166/81     Weight: 106.5 kg (234 lb 12.8 oz)  Body mass index is 29.35 kg/m².    Physical Exam   Constitutional: He appears well-developed and well-nourished. No distress.   HENT:   Head: Normocephalic and atraumatic.   Eyes: EOM are normal. Pupils  are equal, round, and reactive to light.   Neck: Normal range of motion. Neck supple.   Cardiovascular: Normal rate.    Appears completely paced, no coordinated rhythm   Pulmonary/Chest: Effort normal. No respiratory distress.   Abdominal: Soft. He exhibits mass. He exhibits no distension. There is no tenderness. There is no guarding (Large, hard, nontender LUQ mass).   Genitourinary:   Genitourinary Comments: Carney in place   Musculoskeletal: Normal range of motion.   Neurological: He is alert.   Skin: Skin is warm and dry. He is not diaphoretic.   Psychiatric: He has a normal mood and affect. His behavior is normal.   Nursing note and vitals reviewed.      Significant Labs:  CBC:   Recent Labs  Lab 05/14/18 2051   WBC 7.25   RBC 3.19*   HGB 8.6*   HCT 27.6*      MCV 87   MCH 27.0   MCHC 31.2*     CMP:   Recent Labs  Lab 05/14/18 2051   *   CALCIUM 9.5   ALBUMIN 2.0*   PROT 6.7      K 4.3   CO2 26      BUN 25*   CREATININE 2.7*   ALKPHOS 109   ALT <5*   AST 6*   BILITOT 0.7     Path  Adrenal Bx 3/29/18:  The mass shows a 3-4mm thick fibrous capsule with some hemosiderin  deposition. The next 5mm consists of hemorrhage with some organization  and slight acute inflammation. The deep portion of the biopsy shows  necrosis. No diagnostic evidence of malignancy is identified.    Significant Diagnostics:  I have reviewed all pertinent imaging results/findings within the past 24 hours.   CT: Significant increase in size of the mass when compared to CT from 2016  in the region of the left adrenal gland which pushes the bowel to the right and left kidney posteriorly measuring 20 x 17 x 22 cm..  This could represent a slow growing malignancy which is of concern versus hemorrhage into a mass.  Consider rebiopsy if clinically indicated

## 2018-05-15 NOTE — PLAN OF CARE
N/A Patient transferred from [facility] on 5/14/18 for SOB (Admit date there was 5/2/18.)       05/15/18 1315   Readmission Questionnaire   At the time of your discharge, did someone talk to you about what your health problems were? Yes   Lives With alone   Who are your caregiver(s) and their phone number(s)? (3 adult children available to assist him as he needs: 1. KikeGermaine Daughter     [phone], 2. Darline العراقي Daughter     [phone], 3. Ab Cuadra Son     [phone].)

## 2018-05-15 NOTE — ASSESSMENT & PLAN NOTE
Patient is 81 yo M who presents with large necrotic left adrenal mass    Admit to general surgery  CLD  Sliding scale insulin  Tele  Heparin gtt for afib  Daily labs, replete lytes as needed  Home meds  Upon arrival patient voided approx 50 mL with post void residual being 700+, calabrese was placed at that time.

## 2018-05-15 NOTE — HPI
Patient is an 83 yo M with history of dementia, RCC, possible colon CA, CKD 4, Afib with pacemaker, CHF with EF 36%, and a large abdominal (likely adrenal) mass who presented to OSH 5/2/18 with pneumonia and during his admission was found to have a large adrenal mass that was enlarged from previous CT scan, and he was transferred here for further workup. General surgery is planning on removing the mass, and cardiology was consulted for pre-op clearance. Patient states that he has never had an MI or cardiac disease that he is aware of, but has a pacemaker for reason unknown to patient. States that he sleeps sitting near upright. He has never had LL edema.

## 2018-05-15 NOTE — ASSESSMENT & PLAN NOTE
Patient is 83 yo M who presents with large necrotic left adrenal mass    -continue CLD  -pain/nausea meds PRN  -bowel regimen - miralax  -f/u urine studies  -DVT/GI prophylaxis  -PT/OT  -will discuss timing of possible surgical intervention

## 2018-05-15 NOTE — PLAN OF CARE
Patient lives alone in a 1 story house w/3-4 LAVON, no railing. Not medically stable for discharge;Testing underway;Per MD note, surgery clearance will be needed;Surgery date TBD.     [facility] given to patient after informed about it;patient verbalized their understanding.        05/15/18 1315   Discharge Assessment   Assessment Type Discharge Planning Assessment   Confirmed/corrected address and phone number on facesheet? Yes   Assessment information obtained from? Patient;Medical Record;Other  (Patient provided all information except for he didn't know if he was on Coumadin at home. Spoke to DaughterDm, over phone, to ask about if patient was on Coumadin. )   Expected Length of Stay (days) (10-12)   Communicated expected length of stay with patient/caregiver no  (Per MD)   Prior to hospitilization cognitive status: Alert/Oriented;No Deficits   Prior to hospitalization functional status: Independent;Assistive Equipment;Needs Assistance   Current cognitive status: Alert/Oriented;No Deficits   Current Functional Status: Independent;Assistive Equipment;Needs Assistance   Facility Arrived From: ([facility] )   Lives With alone   Able to Return to Prior Arrangements unable to determine at this time (comments)   Is patient able to care for self after discharge? Unable to determine at this time (comments)   Who are your caregiver(s) and their phone number(s)? (3 adult children available to assist him as he needs: 1. Germaine Stokes Daughter     [phone], 2. Darline العراقي Daughter     [phone], 3. Ab Cuadra Son     [phone].)   Patient's perception of discharge disposition home or selfcare;home health  (Needs TBD/Current w/[facility]. Happy w/their service. )   Readmission Within The Last 30 Days unable to assess   Patient currently being followed by outpatient case management? No   Patient currently receives any other outside agency services? No   Equipment Currently Used at Home  CPAP;walker, rolling;bedside commode   Do you have any problems affording any of your prescribed medications? No   Is the patient taking medications as prescribed? yes   Does the patient have transportation home? Yes   Transportation Available car;family or friend will provide   Dialysis Name and Scheduled days (N/A)   Does the patient receive services at the Coumadin Clinic? No  (Daughter states he has been off Coumadin since 3/18. (Nalini CARVALHO was drawing labs for Coumadin when he was on Coumadin.))   Discharge Plan A Home;Home Health;Other  (Resume HH as above w/Adult children x3 to assist him as he needs. )   Discharge Plan B Skilled Nursing Facility  (Needs TBD/? SNF)   Patient/Family In Agreement With Plan unable to assess

## 2018-05-15 NOTE — PLAN OF CARE
Problem: Patient Care Overview  Goal: Plan of Care Review  Pt A & O x 4, adequate urine output via urinal, vital signs stable on 2 L O2 x NC and Cpap. (he puts on when napping).  Pt denies pain. Heparin drip remains theraputic at 20.2 mL, next blood due in AM.

## 2018-05-16 LAB
ALBUMIN SERPL BCP-MCNC: 2 G/DL
ALP SERPL-CCNC: 141 U/L
ALT SERPL W/O P-5'-P-CCNC: <5 U/L
ANION GAP SERPL CALC-SCNC: 9 MMOL/L
AST SERPL-CCNC: 9 U/L
BASOPHILS # BLD AUTO: 0.05 K/UL
BASOPHILS NFR BLD: 0.8 %
BILIRUB SERPL-MCNC: 0.7 MG/DL
BUN SERPL-MCNC: 22 MG/DL
CALCIUM SERPL-MCNC: 9.2 MG/DL
CHLORIDE SERPL-SCNC: 105 MMOL/L
CO2 SERPL-SCNC: 25 MMOL/L
CREAT SERPL-MCNC: 2.8 MG/DL
DIFFERENTIAL METHOD: ABNORMAL
EOSINOPHIL # BLD AUTO: 0.1 K/UL
EOSINOPHIL NFR BLD: 1.8 %
ERYTHROCYTE [DISTWIDTH] IN BLOOD BY AUTOMATED COUNT: 19.9 %
EST. GFR  (AFRICAN AMERICAN): 23.6 ML/MIN/1.73 M^2
EST. GFR  (NON AFRICAN AMERICAN): 20.4 ML/MIN/1.73 M^2
FACT X PPP CHRO-ACNC: 0.37 IU/ML
GLUCOSE SERPL-MCNC: 141 MG/DL
HCT VFR BLD AUTO: 27.1 %
HGB BLD-MCNC: 8 G/DL
IMM GRANULOCYTES # BLD AUTO: 0.03 K/UL
IMM GRANULOCYTES NFR BLD AUTO: 0.5 %
LYMPHOCYTES # BLD AUTO: 1.1 K/UL
LYMPHOCYTES NFR BLD: 16.5 %
MAGNESIUM SERPL-MCNC: 2 MG/DL
MCH RBC QN AUTO: 25.8 PG
MCHC RBC AUTO-ENTMCNC: 29.5 G/DL
MCV RBC AUTO: 87 FL
MONOCYTES # BLD AUTO: 0.7 K/UL
MONOCYTES NFR BLD: 11.1 %
NEUTROPHILS # BLD AUTO: 4.5 K/UL
NEUTROPHILS NFR BLD: 69.3 %
NRBC BLD-RTO: 0 /100 WBC
PHOSPHATE SERPL-MCNC: 2.7 MG/DL
PLATELET # BLD AUTO: 234 K/UL
PMV BLD AUTO: 12.5 FL
POCT GLUCOSE: 160 MG/DL (ref 70–110)
POCT GLUCOSE: 174 MG/DL (ref 70–110)
POCT GLUCOSE: 195 MG/DL (ref 70–110)
POCT GLUCOSE: 224 MG/DL (ref 70–110)
POTASSIUM SERPL-SCNC: 4.1 MMOL/L
PROT SERPL-MCNC: 6.6 G/DL
RBC # BLD AUTO: 3.1 M/UL
SODIUM SERPL-SCNC: 139 MMOL/L
WBC # BLD AUTO: 6.55 K/UL

## 2018-05-16 PROCEDURE — 94640 AIRWAY INHALATION TREATMENT: CPT

## 2018-05-16 PROCEDURE — 85025 COMPLETE CBC W/AUTO DIFF WBC: CPT

## 2018-05-16 PROCEDURE — 83735 ASSAY OF MAGNESIUM: CPT

## 2018-05-16 PROCEDURE — 25000003 PHARM REV CODE 250: Performed by: STUDENT IN AN ORGANIZED HEALTH CARE EDUCATION/TRAINING PROGRAM

## 2018-05-16 PROCEDURE — 85520 HEPARIN ASSAY: CPT

## 2018-05-16 PROCEDURE — 94761 N-INVAS EAR/PLS OXIMETRY MLT: CPT

## 2018-05-16 PROCEDURE — 20600001 HC STEP DOWN PRIVATE ROOM

## 2018-05-16 PROCEDURE — 63600175 PHARM REV CODE 636 W HCPCS: Performed by: STUDENT IN AN ORGANIZED HEALTH CARE EDUCATION/TRAINING PROGRAM

## 2018-05-16 PROCEDURE — 84100 ASSAY OF PHOSPHORUS: CPT

## 2018-05-16 PROCEDURE — 25000242 PHARM REV CODE 250 ALT 637 W/ HCPCS: Performed by: STUDENT IN AN ORGANIZED HEALTH CARE EDUCATION/TRAINING PROGRAM

## 2018-05-16 PROCEDURE — 36415 COLL VENOUS BLD VENIPUNCTURE: CPT

## 2018-05-16 PROCEDURE — 80053 COMPREHEN METABOLIC PANEL: CPT

## 2018-05-16 RX ORDER — HYDRALAZINE HYDROCHLORIDE 20 MG/ML
10 INJECTION INTRAMUSCULAR; INTRAVENOUS EVERY 8 HOURS PRN
Status: DISCONTINUED | OUTPATIENT
Start: 2018-05-16 | End: 2018-05-21

## 2018-05-16 RX ORDER — LABETALOL HYDROCHLORIDE 5 MG/ML
10 INJECTION, SOLUTION INTRAVENOUS EVERY 6 HOURS PRN
Status: DISCONTINUED | OUTPATIENT
Start: 2018-05-16 | End: 2018-05-16

## 2018-05-16 RX ADMIN — HYDRALAZINE HYDROCHLORIDE AND ISOSORBIDE DINITRATE 2 TABLET: 37.5; 2 TABLET, FILM COATED ORAL at 08:05

## 2018-05-16 RX ADMIN — FERROUS SULFATE TAB EC 325 MG (65 MG FE EQUIVALENT) 325 MG: 325 (65 FE) TABLET DELAYED RESPONSE at 08:05

## 2018-05-16 RX ADMIN — MEMANTINE HYDROCHLORIDE 10 MG: 5 TABLET ORAL at 08:05

## 2018-05-16 RX ADMIN — SODIUM BICARBONATE 650 MG TABLET 650 MG: at 04:05

## 2018-05-16 RX ADMIN — PANTOPRAZOLE SODIUM 40 MG: 40 TABLET, DELAYED RELEASE ORAL at 09:05

## 2018-05-16 RX ADMIN — CLONIDINE HYDROCHLORIDE 0.3 MG: 0.1 TABLET ORAL at 09:05

## 2018-05-16 RX ADMIN — HYDRALAZINE HYDROCHLORIDE 10 MG: 20 INJECTION INTRAMUSCULAR; INTRAVENOUS at 07:05

## 2018-05-16 RX ADMIN — IPRATROPIUM BROMIDE AND ALBUTEROL SULFATE 3 ML: .5; 3 SOLUTION RESPIRATORY (INHALATION) at 09:05

## 2018-05-16 RX ADMIN — HYDRALAZINE HYDROCHLORIDE AND ISOSORBIDE DINITRATE 2 TABLET: 37.5; 2 TABLET, FILM COATED ORAL at 04:05

## 2018-05-16 RX ADMIN — SODIUM BICARBONATE 650 MG TABLET 650 MG: at 09:05

## 2018-05-16 RX ADMIN — IPRATROPIUM BROMIDE AND ALBUTEROL SULFATE 3 ML: .5; 3 SOLUTION RESPIRATORY (INHALATION) at 04:05

## 2018-05-16 RX ADMIN — SODIUM BICARBONATE 650 MG TABLET 650 MG: at 08:05

## 2018-05-16 RX ADMIN — HYDRALAZINE HYDROCHLORIDE AND ISOSORBIDE DINITRATE 2 TABLET: 37.5; 2 TABLET, FILM COATED ORAL at 09:05

## 2018-05-16 RX ADMIN — CARVEDILOL 25 MG: 25 TABLET, FILM COATED ORAL at 04:05

## 2018-05-16 RX ADMIN — IPRATROPIUM BROMIDE AND ALBUTEROL SULFATE 3 ML: .5; 3 SOLUTION RESPIRATORY (INHALATION) at 11:05

## 2018-05-16 RX ADMIN — HEPARIN SODIUM 18.97 UNITS/KG/HR: 10000 INJECTION, SOLUTION INTRAVENOUS at 04:05

## 2018-05-16 RX ADMIN — CLONIDINE HYDROCHLORIDE 0.3 MG: 0.1 TABLET ORAL at 08:05

## 2018-05-16 RX ADMIN — CARVEDILOL 25 MG: 25 TABLET, FILM COATED ORAL at 09:05

## 2018-05-16 RX ADMIN — CLONIDINE HYDROCHLORIDE 0.3 MG: 0.1 TABLET ORAL at 04:05

## 2018-05-16 RX ADMIN — FERROUS SULFATE TAB EC 325 MG (65 MG FE EQUIVALENT) 325 MG: 325 (65 FE) TABLET DELAYED RESPONSE at 09:05

## 2018-05-16 RX ADMIN — AMLODIPINE BESYLATE 5 MG: 5 TABLET ORAL at 09:05

## 2018-05-16 RX ADMIN — DUTASTERIDE 0.5 MG: 0.5 CAPSULE, LIQUID FILLED ORAL at 09:05

## 2018-05-16 RX ADMIN — MEMANTINE HYDROCHLORIDE 10 MG: 5 TABLET ORAL at 09:05

## 2018-05-16 RX ADMIN — IPRATROPIUM BROMIDE AND ALBUTEROL SULFATE 3 ML: .5; 3 SOLUTION RESPIRATORY (INHALATION) at 12:05

## 2018-05-16 NOTE — SUBJECTIVE & OBJECTIVE
Interval History:   Patient seen and examined, no acute events overnight  Tolerating minimal diet  Denies abdominal pain  +F/no BM  Afebrile/baseline hypertensive    Medications:  Continuous Infusions:   heparin (porcine) in D5W 18.967 Units/kg/hr (05/15/18 1602)     Scheduled Meds:   albuterol-ipratropium 2.5mg-0.5mg/3mL  3 mL Nebulization Q8H    amLODIPine  5 mg Oral Daily    carvedilol  25 mg Oral BID WM    cloNIDine  0.3 mg Oral TID    dutasteride  0.5 mg Oral Daily    ferrous sulfate  325 mg Oral BID    isosorbide-hydrALAZINE 20-37.5 mg  2 tablet Oral TID    memantine  10 mg Oral BID    pantoprazole  40 mg Oral Daily    polyethylene glycol  17 g Oral Daily    sodium bicarbonate  1 tablet Oral TID     PRN Meds:acetaminophen, dextrose 50%, dextrose 50%, diphenhydrAMINE, glucagon (human recombinant), glucose, glucose, heparin (PORCINE), heparin (PORCINE), insulin aspart U-100, ondansetron, promethazine (PHENERGAN) IVPB, sodium chloride 0.9%     Review of patient's allergies indicates:  No Known Allergies  Objective:     Vital Signs (Most Recent):  Temp: 98.3 °F (36.8 °C) (05/16/18 0401)  Pulse: 69 (05/16/18 0710)  Resp: 16 (05/16/18 0401)  BP: (!) 173/91 (05/15/18 2029)  SpO2: 99 % (05/16/18 0401) Vital Signs (24h Range):  Temp:  [97.9 °F (36.6 °C)-98.5 °F (36.9 °C)] 98.3 °F (36.8 °C)  Pulse:  [68-72] 69  Resp:  [16-20] 16  SpO2:  [95 %-99 %] 99 %  BP: (163-184)/(81-93) 173/91     Weight: 106.5 kg (234 lb 12.8 oz)  Body mass index is 29.35 kg/m².    Intake/Output - Last 3 Shifts       05/14 0700 - 05/15 0659 05/15 0700 - 05/16 0659 05/16 0700 - 05/17 0659    P.O.  840     I.V. (mL/kg)  565.3 (5.3)     IV Piggyback  0.2     Total Intake(mL/kg)  1405.5 (13.2)     Urine (mL/kg/hr) 600 1150 (0.4)     Total Output 600 1150      Net -600 +255.5                   Physical Exam   Constitutional: He appears well-developed and well-nourished. No distress.   HENT:   Head: Normocephalic and atraumatic.    Cardiovascular: Normal rate.    Appears completely paced, no coordinated rhythm   Pulmonary/Chest: Effort normal. No respiratory distress.   Abdominal:   Soft, non-tender, large, hard, nontender LUQ mass    Genitourinary:   Genitourinary Comments: Carney in place   Neurological: He is alert.   Skin: Skin is warm and dry. He is not diaphoretic.   Psychiatric: He has a normal mood and affect. His behavior is normal.       Significant Labs:  CBC:   Recent Labs  Lab 05/16/18 0327   WBC 6.55   RBC 3.10*   HGB 8.0*   HCT 27.1*      MCV 87   MCH 25.8*   MCHC 29.5*     BMP:   Recent Labs  Lab 05/16/18 0327   *      K 4.1      CO2 25   BUN 22   CREATININE 2.8*   CALCIUM 9.2   MG 2.0     CMP:   Recent Labs  Lab 05/16/18 0327   *   CALCIUM 9.2   ALBUMIN 2.0*   PROT 6.6      K 4.1   CO2 25      BUN 22   CREATININE 2.8*   ALKPHOS 141*   ALT <5*   AST 9*   BILITOT 0.7     LFTs:   Recent Labs  Lab 05/16/18 0327   ALT <5*   AST 9*   ALKPHOS 141*   BILITOT 0.7   PROT 6.6   ALBUMIN 2.0*     Coagulation:   Recent Labs  Lab 05/14/18  2051   LABPROT 11.0   INR 1.1   APTT 24.7

## 2018-05-16 NOTE — PLAN OF CARE
Visited patient. AAOX3. His 2 daughters are at BS. Summoned to room per daughters request to inform CM they have made arrangements to have patient discharged, when stable, to [facility], then NH (Contact: Mary  [phone]). This was set up prior to transfer here, when patient was at [facility].   Dr. Portillo passed by & discussed plan w/them;Surgery planned for 5/21/18 pending cardiac clearance & other test results.   Assured daughters CM, together, w/SW, DALIA Ewing, will make arrangements when he is closer to discharge. Gave her CM/SW contact information. They verbalized their understanding.   DALIA Ewing, VINICIUS, updated.

## 2018-05-16 NOTE — ASSESSMENT & PLAN NOTE
Patient is 81 yo M who presents with large necrotic left adrenal mass    -renal diet  -pain/nausea meds PRN  -bowel regimen - miralax  -f/u urine studies  -DVT/GI prophylaxis  -PT/OT  -plan for surgery 5/21/18

## 2018-05-16 NOTE — MEDICAL/APP STUDENT
Patient is an 81 yo M with history of dementia, RCC, possible colon CA, CKD 4, Afib with pacemaker, CHF with EF 36%, who presented to OSH 5/2/18 with a large necrotic left adrenal mass    Interval History  Acute events  Diet  Bowel movement  Fever?    Medications  NKDA    Vital signs  Weight  BMI  I/O  PE  Significant Labs    Assessment  81 yo M who presents with large necrotic left adrenal mass    Adrenal Tumor  -continue CLD  -pain/nausea meds PRN  -bowel regimen - miralax  -f/u urine studies  -DVT/GI prophylaxis  -PT/OT  -will discuss timing of possible surgical intervention         Dementia  Home meds  -memantine        Urinary retention  Home meds  -dutasteride  -calabrese        Non-ischemic cardiomyopathy  Home meds  -amlodipine, clonidine, carvedilol, bidil  -Cardiology consult on 3/15/18 for preop eval for abdominal surgery - Cardiology would do nothing more to prep patient for surgery        Type 2 diabetes mellitus with diabetic peripheral angiopathy without gangrene, without long-term current use of insulin  -SSI (sliding scale insulin)  -accuchecks (check BGL)        Microcytic anemia  -Iron supplement        Chronic atrial fibrillation  -Tele  -Heparin gtt  -Cardiology consult on 3/15/18 for preop eval for abdominal surgery - Cardiology would do nothing more to prep patient for surgery

## 2018-05-16 NOTE — MEDICAL/APP STUDENT
83 yo M with a history of dementia, RCC, possible colon CA, CKD 4, Afib with pacemaker, CHF with EF 36%, presented on 5/2/18 with a large necrotic left adrenal mass.    Interval Hx:  Acute events  Diet  Bowel movements  Fever, HTN?    Medications  NKDA    Vital signs  Weight/BMI    I/O    Physical Exam    Labs    Assessment/Plan  83 yo M who presents with large necrotic left adrenal mass  Adrenal Tumor  -continue CLD  -pain/nausea meds PRN  -bowel regimen - miralax  -f/u urine studies  -DVT/GI prophylaxis  -PT/OT  -will discuss timing of possible surgical intervention    Dementia  Home meds  -memantine        Urinary retention  Home meds  -dutasteride  -calabrese        Non-ischemic cardiomyopathy  Home meds  -amlodipine, clonidine, carvedilol, bidil  -cards clearance prior to surgery        Type 2 diabetes mellitus with diabetic peripheral angiopathy without gangrene, without long-term current use of insulin   -SSI, accuchecks        Microcytic anemia  -Iron supplement        Chronic atrial fibrillation  Tele  Heparin gtt  Cards clearance prior to possible surgical intervention

## 2018-05-16 NOTE — ASSESSMENT & PLAN NOTE
"Tele  Heparin gtt  Cards clearance prior to possible surgical intervention, appreciate assistance  "Moderate risk - Cardiology would do nothing more to prep this patient for said surgery."  "

## 2018-05-16 NOTE — PROGRESS NOTES
"[facility]  General Surgery  Progress Note    Subjective:     History of Present Illness:  Patient is an 83 yo M with history of dementia, RCC, possible colon CA, CKD 4, Afib with pacemaker, CHF with EF 36%, and a large abdominal (likely adrenal) mass who presented to OSH 5/2/18 with pneumonia and during his admission was found to have a large adrenal mass that was enlarged from previous CT scan, and he was transferred here for further workup. A CT scan from 2016 showed a similar sized mass. He underwent biopsy which showed no obvious malignancy, mostly necrotic tissue. He has completed his course of abx for pneumonia and his SOA has resolved.    When asked, he states the mass has been present for years. He denies any nausea, vomiting, weight loss, fevers, or chills. He continues to have bowel movements, with bowel incontinence (he says that's his baseline). He has been off anticoagulation despite being in afib. He has been having issues urinating.    He has undergone right nephrectomy and colon resection of 22 cm "maybe for cancer".    Post-Op Info:  * No surgery found *         Interval History:   Patient seen and examined, no acute events overnight  Tolerating minimal diet  Denies abdominal pain  +F/no BM  Afebrile/baseline hypertensive    Medications:  Continuous Infusions:   heparin (porcine) in D5W 18.967 Units/kg/hr (05/15/18 1602)     Scheduled Meds:   albuterol-ipratropium 2.5mg-0.5mg/3mL  3 mL Nebulization Q8H    amLODIPine  5 mg Oral Daily    carvedilol  25 mg Oral BID WM    cloNIDine  0.3 mg Oral TID    dutasteride  0.5 mg Oral Daily    ferrous sulfate  325 mg Oral BID    isosorbide-hydrALAZINE 20-37.5 mg  2 tablet Oral TID    memantine  10 mg Oral BID    pantoprazole  40 mg Oral Daily    polyethylene glycol  17 g Oral Daily    sodium bicarbonate  1 tablet Oral TID     PRN Meds:acetaminophen, dextrose 50%, dextrose 50%, diphenhydrAMINE, glucagon (human recombinant), " glucose, glucose, heparin (PORCINE), heparin (PORCINE), insulin aspart U-100, ondansetron, promethazine (PHENERGAN) IVPB, sodium chloride 0.9%     Review of patient's allergies indicates:  No Known Allergies  Objective:     Vital Signs (Most Recent):  Temp: 98.3 °F (36.8 °C) (05/16/18 0401)  Pulse: 69 (05/16/18 0710)  Resp: 16 (05/16/18 0401)  BP: (!) 173/91 (05/15/18 2029)  SpO2: 99 % (05/16/18 0401) Vital Signs (24h Range):  Temp:  [97.9 °F (36.6 °C)-98.5 °F (36.9 °C)] 98.3 °F (36.8 °C)  Pulse:  [68-72] 69  Resp:  [16-20] 16  SpO2:  [95 %-99 %] 99 %  BP: (163-184)/(81-93) 173/91     Weight: 106.5 kg (234 lb 12.8 oz)  Body mass index is 29.35 kg/m².    Intake/Output - Last 3 Shifts       05/14 0700 - 05/15 0659 05/15 0700 - 05/16 0659 05/16 0700 - 05/17 0659    P.O.  840     I.V. (mL/kg)  565.3 (5.3)     IV Piggyback  0.2     Total Intake(mL/kg)  1405.5 (13.2)     Urine (mL/kg/hr) 600 1150 (0.4)     Total Output 600 1150      Net -600 +255.5                   Physical Exam   Constitutional: He appears well-developed and well-nourished. No distress.   HENT:   Head: Normocephalic and atraumatic.   Cardiovascular: Normal rate.    Appears completely paced, no coordinated rhythm   Pulmonary/Chest: Effort normal. No respiratory distress.   Abdominal:   Soft, non-tender, large, hard, nontender LUQ mass    Genitourinary:   Genitourinary Comments: Carney in place   Neurological: He is alert.   Skin: Skin is warm and dry. He is not diaphoretic.   Psychiatric: He has a normal mood and affect. His behavior is normal.       Significant Labs:  CBC:   Recent Labs  Lab 05/16/18 0327   WBC 6.55   RBC 3.10*   HGB 8.0*   HCT 27.1*      MCV 87   MCH 25.8*   MCHC 29.5*     BMP:   Recent Labs  Lab 05/16/18 0327   *      K 4.1      CO2 25   BUN 22   CREATININE 2.8*   CALCIUM 9.2   MG 2.0     CMP:   Recent Labs  Lab 05/16/18 0327   *   CALCIUM 9.2   ALBUMIN 2.0*   PROT 6.6      K 4.1   CO2 25   CL  "105   BUN 22   CREATININE 2.8*   ALKPHOS 141*   ALT <5*   AST 9*   BILITOT 0.7     LFTs:   Recent Labs  Lab 05/16/18  0327   ALT <5*   AST 9*   ALKPHOS 141*   BILITOT 0.7   PROT 6.6   ALBUMIN 2.0*     Coagulation:   Recent Labs  Lab 05/14/18  2051   LABPROT 11.0   INR 1.1   APTT 24.7     Assessment/Plan:     * Non-ischemic cardiomyopathy    Home meds  -amlodipine, clonidine, carvedilol, bidil  -cards clearance prior to surgery        Dementia    Home meds  -memantine        Urinary retention    Home meds  -dutasteride  -calabrese        Adrenal tumor    Patient is 81 yo M who presents with large necrotic left adrenal mass    -renal diet  -pain/nausea meds PRN  -bowel regimen - miralax  -f/u urine studies  -DVT/GI prophylaxis  -PT/OT  -plan for surgery 5/21/18        Type 2 diabetes mellitus with diabetic peripheral angiopathy without gangrene, without long-term current use of insulin    SSI, accuchecks        Microcytic anemia    Iron supplement        Chronic atrial fibrillation    Tele  Heparin gtt  Cards clearance prior to possible surgical intervention, appreciate assistance  "Moderate risk - Cardiology would do nothing more to prep this patient for said surgery."            Lexi Tolentino PA-C   [id number]  General Surgery  [facility]  "

## 2018-05-17 LAB
ALBUMIN SERPL BCP-MCNC: 2 G/DL
ALDOST SERPL-MCNC: 10.3 NG/DL
ALDOST SERPL-MCNC: 8.7 NG/DL
ALDOST/RENIN PLAS-RTO: NORMAL RATIO
ALDOST/RENIN PLAS-RTO: NORMAL RATIO
ALP SERPL-CCNC: 167 U/L
ALT SERPL W/O P-5'-P-CCNC: 5 U/L
ANION GAP SERPL CALC-SCNC: 8 MMOL/L
AST SERPL-CCNC: 14 U/L
BASOPHILS # BLD AUTO: 0.05 K/UL
BASOPHILS NFR BLD: 0.8 %
BILIRUB SERPL-MCNC: 0.6 MG/DL
BUN SERPL-MCNC: 22 MG/DL
CALCIUM SERPL-MCNC: 9.3 MG/DL
CHLORIDE SERPL-SCNC: 108 MMOL/L
CO2 SERPL-SCNC: 23 MMOL/L
CREAT SERPL-MCNC: 2.6 MG/DL
DIFFERENTIAL METHOD: ABNORMAL
EOSINOPHIL # BLD AUTO: 0.1 K/UL
EOSINOPHIL NFR BLD: 1.7 %
ERYTHROCYTE [DISTWIDTH] IN BLOOD BY AUTOMATED COUNT: 19.5 %
EST. GFR  (AFRICAN AMERICAN): 25.8 ML/MIN/1.73 M^2
EST. GFR  (NON AFRICAN AMERICAN): 22.3 ML/MIN/1.73 M^2
FACT X PPP CHRO-ACNC: 0.33 IU/ML
GLUCOSE SERPL-MCNC: 164 MG/DL
HCT VFR BLD AUTO: 27.5 %
HGB BLD-MCNC: 8.4 G/DL
IMM GRANULOCYTES # BLD AUTO: 0.04 K/UL
IMM GRANULOCYTES NFR BLD AUTO: 0.6 %
LYMPHOCYTES # BLD AUTO: 1 K/UL
LYMPHOCYTES NFR BLD: 15.8 %
MAGNESIUM SERPL-MCNC: 2 MG/DL
MCH RBC QN AUTO: 26.4 PG
MCHC RBC AUTO-ENTMCNC: 30.5 G/DL
MCV RBC AUTO: 87 FL
MONOCYTES # BLD AUTO: 0.7 K/UL
MONOCYTES NFR BLD: 11.5 %
NEUTROPHILS # BLD AUTO: 4.5 K/UL
NEUTROPHILS NFR BLD: 69.6 %
NRBC BLD-RTO: 0 /100 WBC
PHOSPHATE SERPL-MCNC: 2.6 MG/DL
PLATELET # BLD AUTO: 246 K/UL
PMV BLD AUTO: 12.3 FL
POCT GLUCOSE: 157 MG/DL (ref 70–110)
POCT GLUCOSE: 157 MG/DL (ref 70–110)
POCT GLUCOSE: 160 MG/DL (ref 70–110)
POCT GLUCOSE: 163 MG/DL (ref 70–110)
POTASSIUM SERPL-SCNC: 3.9 MMOL/L
PROT SERPL-MCNC: 6.6 G/DL
RBC # BLD AUTO: 3.18 M/UL
RENIN PLAS-CCNC: <0.1 NG/ML/HR
RENIN PLAS-CCNC: <0.1 NG/ML/HR
SODIUM SERPL-SCNC: 139 MMOL/L
WBC # BLD AUTO: 6.41 K/UL

## 2018-05-17 PROCEDURE — 25000242 PHARM REV CODE 250 ALT 637 W/ HCPCS: Performed by: STUDENT IN AN ORGANIZED HEALTH CARE EDUCATION/TRAINING PROGRAM

## 2018-05-17 PROCEDURE — 63600175 PHARM REV CODE 636 W HCPCS: Performed by: STUDENT IN AN ORGANIZED HEALTH CARE EDUCATION/TRAINING PROGRAM

## 2018-05-17 PROCEDURE — 94640 AIRWAY INHALATION TREATMENT: CPT

## 2018-05-17 PROCEDURE — 94761 N-INVAS EAR/PLS OXIMETRY MLT: CPT

## 2018-05-17 PROCEDURE — 85025 COMPLETE CBC W/AUTO DIFF WBC: CPT

## 2018-05-17 PROCEDURE — 85520 HEPARIN ASSAY: CPT

## 2018-05-17 PROCEDURE — 84100 ASSAY OF PHOSPHORUS: CPT

## 2018-05-17 PROCEDURE — 25000003 PHARM REV CODE 250: Performed by: STUDENT IN AN ORGANIZED HEALTH CARE EDUCATION/TRAINING PROGRAM

## 2018-05-17 PROCEDURE — 99900035 HC TECH TIME PER 15 MIN (STAT)

## 2018-05-17 PROCEDURE — 83735 ASSAY OF MAGNESIUM: CPT

## 2018-05-17 PROCEDURE — 20600001 HC STEP DOWN PRIVATE ROOM

## 2018-05-17 PROCEDURE — 80053 COMPREHEN METABOLIC PANEL: CPT

## 2018-05-17 PROCEDURE — 36415 COLL VENOUS BLD VENIPUNCTURE: CPT

## 2018-05-17 PROCEDURE — 94660 CPAP INITIATION&MGMT: CPT

## 2018-05-17 RX ADMIN — SODIUM BICARBONATE 650 MG TABLET 650 MG: at 03:05

## 2018-05-17 RX ADMIN — CLONIDINE HYDROCHLORIDE 0.3 MG: 0.1 TABLET ORAL at 08:05

## 2018-05-17 RX ADMIN — DUTASTERIDE 0.5 MG: 0.5 CAPSULE, LIQUID FILLED ORAL at 08:05

## 2018-05-17 RX ADMIN — HYDRALAZINE HYDROCHLORIDE AND ISOSORBIDE DINITRATE 2 TABLET: 37.5; 2 TABLET, FILM COATED ORAL at 08:05

## 2018-05-17 RX ADMIN — IPRATROPIUM BROMIDE AND ALBUTEROL SULFATE 3 ML: .5; 3 SOLUTION RESPIRATORY (INHALATION) at 04:05

## 2018-05-17 RX ADMIN — HYDRALAZINE HYDROCHLORIDE AND ISOSORBIDE DINITRATE 2 TABLET: 37.5; 2 TABLET, FILM COATED ORAL at 03:05

## 2018-05-17 RX ADMIN — CLONIDINE HYDROCHLORIDE 0.3 MG: 0.1 TABLET ORAL at 03:05

## 2018-05-17 RX ADMIN — FERROUS SULFATE TAB EC 325 MG (65 MG FE EQUIVALENT) 325 MG: 325 (65 FE) TABLET DELAYED RESPONSE at 08:05

## 2018-05-17 RX ADMIN — MEMANTINE HYDROCHLORIDE 10 MG: 5 TABLET ORAL at 08:05

## 2018-05-17 RX ADMIN — SODIUM BICARBONATE 650 MG TABLET 650 MG: at 08:05

## 2018-05-17 RX ADMIN — AMLODIPINE BESYLATE 5 MG: 5 TABLET ORAL at 08:05

## 2018-05-17 RX ADMIN — CARVEDILOL 25 MG: 25 TABLET, FILM COATED ORAL at 08:05

## 2018-05-17 RX ADMIN — HYDRALAZINE HYDROCHLORIDE 10 MG: 20 INJECTION INTRAMUSCULAR; INTRAVENOUS at 12:05

## 2018-05-17 RX ADMIN — CARVEDILOL 25 MG: 25 TABLET, FILM COATED ORAL at 05:05

## 2018-05-17 RX ADMIN — PANTOPRAZOLE SODIUM 40 MG: 40 TABLET, DELAYED RELEASE ORAL at 08:05

## 2018-05-17 RX ADMIN — IPRATROPIUM BROMIDE AND ALBUTEROL SULFATE 3 ML: .5; 3 SOLUTION RESPIRATORY (INHALATION) at 08:05

## 2018-05-17 RX ADMIN — HEPARIN SODIUM 18.97 UNITS/KG/HR: 10000 INJECTION, SOLUTION INTRAVENOUS at 06:05

## 2018-05-17 RX ADMIN — IPRATROPIUM BROMIDE AND ALBUTEROL SULFATE 3 ML: .5; 3 SOLUTION RESPIRATORY (INHALATION) at 11:05

## 2018-05-17 NOTE — PLAN OF CARE
Problem: Patient Care Overview  Goal: Plan of Care Review  Outcome: Ongoing (interventions implemented as appropriate)  Plan of Care reviewed w/ pt and family at bedside. SBPs elevated to 170s, PRN Hydralazine given pending effect. Abdomen distended, KUB done, no changes noted. Heparin gtt at 18.9u/kg/hr. Tolerating renal diet. Carney in place draining CYU. CPAP at night. Ambulates w/ x1 assist and RW. Accuchecks AC/HS.

## 2018-05-17 NOTE — PROGRESS NOTES
"[facility]  General Surgery  Progress Note    Subjective:     History of Present Illness:  Patient is an 83 yo M with history of dementia, RCC, possible colon CA, CKD 4, Afib with pacemaker, CHF with EF 36%, and a large abdominal (likely adrenal) mass who presented to OSH 5/2/18 with pneumonia and during his admission was found to have a large adrenal mass that was enlarged from previous CT scan, and he was transferred here for further workup. A CT scan from 2016 showed a similar sized mass. He underwent biopsy which showed no obvious malignancy, mostly necrotic tissue. He has completed his course of abx for pneumonia and his SOA has resolved.    When asked, he states the mass has been present for years. He denies any nausea, vomiting, weight loss, fevers, or chills. He continues to have bowel movements, with bowel incontinence (he says that's his baseline). He has been off anticoagulation despite being in afib. He has been having issues urinating.    He has undergone right nephrectomy and colon resection of 22 cm "maybe for cancer".    Post-Op Info:  Procedure(s) (LRB):  ADRENALECTOMY (N/A)         Interval History: Stable course overnight.  Functional studies still pending.    Medications:  Continuous Infusions:   heparin (porcine) in D5W 18.97 Units/kg/hr (05/16/18 1626)     Scheduled Meds:   albuterol-ipratropium 2.5mg-0.5mg/3mL  3 mL Nebulization Q8H    amLODIPine  5 mg Oral Daily    carvedilol  25 mg Oral BID WM    cloNIDine  0.3 mg Oral TID    dutasteride  0.5 mg Oral Daily    ferrous sulfate  325 mg Oral BID    isosorbide-hydrALAZINE 20-37.5 mg  2 tablet Oral TID    memantine  10 mg Oral BID    pantoprazole  40 mg Oral Daily    polyethylene glycol  17 g Oral Daily    sodium bicarbonate  1 tablet Oral TID     PRN Meds:acetaminophen, dextrose 50%, dextrose 50%, diphenhydrAMINE, glucagon (human recombinant), glucose, glucose, heparin (PORCINE), heparin (PORCINE), hydrALAZINE, " insulin aspart U-100, ondansetron, promethazine (PHENERGAN) IVPB, sodium chloride 0.9%     Review of patient's allergies indicates:  No Known Allergies  Objective:     Vital Signs (Most Recent):  Temp: 97.9 °F (36.6 °C) (05/17/18 0817)  Pulse: 73 (05/17/18 0950)  Resp: 16 (05/17/18 0817)  BP: (!) 154/74 (05/17/18 0950)  SpO2: 97 % (05/17/18 0817) Vital Signs (24h Range):  Temp:  [97.1 °F (36.2 °C)-98.1 °F (36.7 °C)] 97.9 °F (36.6 °C)  Pulse:  [64-73] 73  Resp:  [14-20] 16  SpO2:  [95 %-99 %] 97 %  BP: (154-190)/(74-94) 154/74     Weight: 106.5 kg (234 lb 12.8 oz)  Body mass index is 29.35 kg/m².    Intake/Output - Last 3 Shifts       05/15 0700 - 05/16 0659 05/16 0700 - 05/17 0659 05/17 0700 - 05/18 0659    P.O. 840 1360 200    I.V. (mL/kg) 565.3 (5.3) 464.6 (4.4)     IV Piggyback 0.2      Total Intake(mL/kg) 1405.5 (13.2) 1824.6 (17.1) 200 (1.9)    Urine (mL/kg/hr) 1150 (0.4) 1075 (0.4)     Total Output 1150 1075      Net +255.5 +749.6 +200           Stool Occurrence  1 x           Physical Exam   Constitutional: He appears well-developed and well-nourished. No distress.   HENT:   Head: Normocephalic and atraumatic.   Cardiovascular: Normal rate.    Pulmonary/Chest: Effort normal. No respiratory distress.   Abdominal:   Soft, non-tender, large, hard, nontender LUQ mass    Genitourinary:   Genitourinary Comments: Carney in place   Neurological: He is alert.   Skin: Skin is warm and dry. He is not diaphoretic.   Psychiatric: He has a normal mood and affect. His behavior is normal.       Significant Labs:  Recent Labs      05/17/18   0328   WBC  6.41   HGB  8.4*   HCT  27.5*   PLT  246     Recent Labs      05/17/18 0328   NA  139   K  3.9   CL  108   CO2  23   BUN  22   CREATININE  2.6*   PROT  6.6   ALBUMIN  2.0*   BILITOT  0.6   AST  14   ALKPHOS  167*   ALT  5*           Significant Diagnostics:  CT reviewed    Assessment/Plan:     * Non-ischemic cardiomyopathy    Home meds  -amlodipine, clonidine, carvedilol,  "bidil  -cards clearance prior to surgery        Dementia    Home meds  -memantine        Urinary retention    Home meds  -dutasteride  -calabrese        Adrenal tumor    Patient is 83 yo M who presents with large necrotic left adrenal mass    -renal diet  -pain/nausea meds PRN  -bowel regimen - miralax  -f/u urine studies  -DVT/GI prophylaxis  -PT/OT  -plan for surgery 5/21/18        Type 2 diabetes mellitus with diabetic peripheral angiopathy without gangrene, without long-term current use of insulin    SSI, accuchecks        Microcytic anemia    Iron supplement        Chronic atrial fibrillation    Tele  Heparin gtt  Cards clearance prior to possible surgical intervention, appreciate assistance  "Moderate risk - Cardiology would do nothing more to prep this patient for said surgery."            Jonathan Schoen, MD  General Surgery  [facility]  "

## 2018-05-17 NOTE — SUBJECTIVE & OBJECTIVE
Interval History: Stable course overnight.  Functional studies still pending.    Medications:  Continuous Infusions:   heparin (porcine) in D5W 18.97 Units/kg/hr (05/16/18 1626)     Scheduled Meds:   albuterol-ipratropium 2.5mg-0.5mg/3mL  3 mL Nebulization Q8H    amLODIPine  5 mg Oral Daily    carvedilol  25 mg Oral BID WM    cloNIDine  0.3 mg Oral TID    dutasteride  0.5 mg Oral Daily    ferrous sulfate  325 mg Oral BID    isosorbide-hydrALAZINE 20-37.5 mg  2 tablet Oral TID    memantine  10 mg Oral BID    pantoprazole  40 mg Oral Daily    polyethylene glycol  17 g Oral Daily    sodium bicarbonate  1 tablet Oral TID     PRN Meds:acetaminophen, dextrose 50%, dextrose 50%, diphenhydrAMINE, glucagon (human recombinant), glucose, glucose, heparin (PORCINE), heparin (PORCINE), hydrALAZINE, insulin aspart U-100, ondansetron, promethazine (PHENERGAN) IVPB, sodium chloride 0.9%     Review of patient's allergies indicates:  No Known Allergies  Objective:     Vital Signs (Most Recent):  Temp: 97.9 °F (36.6 °C) (05/17/18 0817)  Pulse: 73 (05/17/18 0950)  Resp: 16 (05/17/18 0817)  BP: (!) 154/74 (05/17/18 0950)  SpO2: 97 % (05/17/18 0817) Vital Signs (24h Range):  Temp:  [97.1 °F (36.2 °C)-98.1 °F (36.7 °C)] 97.9 °F (36.6 °C)  Pulse:  [64-73] 73  Resp:  [14-20] 16  SpO2:  [95 %-99 %] 97 %  BP: (154-190)/(74-94) 154/74     Weight: 106.5 kg (234 lb 12.8 oz)  Body mass index is 29.35 kg/m².    Intake/Output - Last 3 Shifts       05/15 0700 - 05/16 0659 05/16 0700 - 05/17 0659 05/17 0700 - 05/18 0659    P.O. 840 1360 200    I.V. (mL/kg) 565.3 (5.3) 464.6 (4.4)     IV Piggyback 0.2      Total Intake(mL/kg) 1405.5 (13.2) 1824.6 (17.1) 200 (1.9)    Urine (mL/kg/hr) 1150 (0.4) 1075 (0.4)     Total Output 1150 1075      Net +255.5 +749.6 +200           Stool Occurrence  1 x           Physical Exam   Constitutional: He appears well-developed and well-nourished. No distress.   HENT:   Head: Normocephalic and atraumatic.    Cardiovascular: Normal rate.    Pulmonary/Chest: Effort normal. No respiratory distress.   Abdominal:   Soft, non-tender, large, hard, nontender LUQ mass    Genitourinary:   Genitourinary Comments: Carney in place   Neurological: He is alert.   Skin: Skin is warm and dry. He is not diaphoretic.   Psychiatric: He has a normal mood and affect. His behavior is normal.       Significant Labs:  Recent Labs      05/17/18   0328   WBC  6.41   HGB  8.4*   HCT  27.5*   PLT  246     Recent Labs      05/17/18   0328   NA  139   K  3.9   CL  108   CO2  23   BUN  22   CREATININE  2.6*   PROT  6.6   ALBUMIN  2.0*   BILITOT  0.6   AST  14   ALKPHOS  167*   ALT  5*           Significant Diagnostics:  CT reviewed

## 2018-05-18 ENCOUNTER — ANESTHESIA EVENT (OUTPATIENT)
Dept: SURGERY | Facility: HOSPITAL | Age: 83
DRG: 614 | End: 2018-05-18
Payer: MEDICARE

## 2018-05-18 LAB
ALBUMIN SERPL BCP-MCNC: 2.1 G/DL
ALP SERPL-CCNC: 164 U/L
ALT SERPL W/O P-5'-P-CCNC: 6 U/L
ANION GAP SERPL CALC-SCNC: 9 MMOL/L
AST SERPL-CCNC: 11 U/L
BASOPHILS # BLD AUTO: 0.05 K/UL
BASOPHILS NFR BLD: 0.7 %
BILIRUB SERPL-MCNC: 0.7 MG/DL
BUN SERPL-MCNC: 19 MG/DL
CALCIUM SERPL-MCNC: 9.4 MG/DL
CHLORIDE SERPL-SCNC: 106 MMOL/L
CO2 SERPL-SCNC: 25 MMOL/L
CORTIS/CREAT UR: 15 MCG/G CR (ref 1–119)
CREAT SERPL-MCNC: 2.5 MG/DL
CREAT UR-MCNC: 89 MG/DL
DIFFERENTIAL METHOD: ABNORMAL
EOSINOPHIL # BLD AUTO: 0.1 K/UL
EOSINOPHIL NFR BLD: 2.1 %
ERYTHROCYTE [DISTWIDTH] IN BLOOD BY AUTOMATED COUNT: 19.3 %
EST. GFR  (AFRICAN AMERICAN): 27 ML/MIN/1.73 M^2
EST. GFR  (NON AFRICAN AMERICAN): 23.4 ML/MIN/1.73 M^2
FACT X PPP CHRO-ACNC: 0.34 IU/ML
GLUCOSE SERPL-MCNC: 142 MG/DL
HCT VFR BLD AUTO: 28.6 %
HGB BLD-MCNC: 8.8 G/DL
IMM GRANULOCYTES # BLD AUTO: 0.05 K/UL
IMM GRANULOCYTES NFR BLD AUTO: 0.7 %
LYMPHOCYTES # BLD AUTO: 1 K/UL
LYMPHOCYTES NFR BLD: 14.3 %
MAGNESIUM SERPL-MCNC: 1.8 MG/DL
MCH RBC QN AUTO: 26.5 PG
MCHC RBC AUTO-ENTMCNC: 30.8 G/DL
MCV RBC AUTO: 86 FL
METANEPH FREE SERPL-MCNC: <25 PG/ML
METANEPHS SERPL-MCNC: 28 PG/ML
MONOCYTES # BLD AUTO: 0.7 K/UL
MONOCYTES NFR BLD: 10.9 %
NEUTROPHILS # BLD AUTO: 4.8 K/UL
NEUTROPHILS NFR BLD: 71.3 %
NORMETANEPH FREE SERPL-MCNC: 28 PG/ML
NRBC BLD-RTO: 0 /100 WBC
PHOSPHATE SERPL-MCNC: 2.4 MG/DL
PLATELET # BLD AUTO: 242 K/UL
PMV BLD AUTO: 12.9 FL
POCT GLUCOSE: 141 MG/DL (ref 70–110)
POCT GLUCOSE: 149 MG/DL (ref 70–110)
POCT GLUCOSE: 149 MG/DL (ref 70–110)
POTASSIUM SERPL-SCNC: 3.7 MMOL/L
PROT SERPL-MCNC: 6.7 G/DL
RBC # BLD AUTO: 3.32 M/UL
SODIUM SERPL-SCNC: 140 MMOL/L
WBC # BLD AUTO: 6.79 K/UL

## 2018-05-18 PROCEDURE — 25000003 PHARM REV CODE 250: Performed by: PHYSICIAN ASSISTANT

## 2018-05-18 PROCEDURE — 25000003 PHARM REV CODE 250: Performed by: STUDENT IN AN ORGANIZED HEALTH CARE EDUCATION/TRAINING PROGRAM

## 2018-05-18 PROCEDURE — 85025 COMPLETE CBC W/AUTO DIFF WBC: CPT

## 2018-05-18 PROCEDURE — 84100 ASSAY OF PHOSPHORUS: CPT

## 2018-05-18 PROCEDURE — G8978 MOBILITY CURRENT STATUS: HCPCS | Mod: CJ

## 2018-05-18 PROCEDURE — 63600175 PHARM REV CODE 636 W HCPCS: Performed by: STUDENT IN AN ORGANIZED HEALTH CARE EDUCATION/TRAINING PROGRAM

## 2018-05-18 PROCEDURE — 97116 GAIT TRAINING THERAPY: CPT

## 2018-05-18 PROCEDURE — G8979 MOBILITY GOAL STATUS: HCPCS | Mod: CI

## 2018-05-18 PROCEDURE — 27000221 HC OXYGEN, UP TO 24 HOURS

## 2018-05-18 PROCEDURE — 25000242 PHARM REV CODE 250 ALT 637 W/ HCPCS: Performed by: STUDENT IN AN ORGANIZED HEALTH CARE EDUCATION/TRAINING PROGRAM

## 2018-05-18 PROCEDURE — 97162 PT EVAL MOD COMPLEX 30 MIN: CPT

## 2018-05-18 PROCEDURE — 94761 N-INVAS EAR/PLS OXIMETRY MLT: CPT

## 2018-05-18 PROCEDURE — 97530 THERAPEUTIC ACTIVITIES: CPT

## 2018-05-18 PROCEDURE — 94640 AIRWAY INHALATION TREATMENT: CPT

## 2018-05-18 PROCEDURE — 20600001 HC STEP DOWN PRIVATE ROOM

## 2018-05-18 PROCEDURE — 83735 ASSAY OF MAGNESIUM: CPT

## 2018-05-18 PROCEDURE — 85520 HEPARIN ASSAY: CPT

## 2018-05-18 PROCEDURE — 80053 COMPREHEN METABOLIC PANEL: CPT

## 2018-05-18 RX ORDER — LABETALOL HYDROCHLORIDE 5 MG/ML
10 INJECTION, SOLUTION INTRAVENOUS ONCE
Status: COMPLETED | OUTPATIENT
Start: 2018-05-18 | End: 2018-05-18

## 2018-05-18 RX ORDER — SODIUM,POTASSIUM PHOSPHATES 280-250MG
2 POWDER IN PACKET (EA) ORAL ONCE
Status: COMPLETED | OUTPATIENT
Start: 2018-05-18 | End: 2018-05-18

## 2018-05-18 RX ADMIN — HEPARIN SODIUM 18.97 UNITS/KG/HR: 10000 INJECTION, SOLUTION INTRAVENOUS at 11:05

## 2018-05-18 RX ADMIN — HYDRALAZINE HYDROCHLORIDE AND ISOSORBIDE DINITRATE 2 TABLET: 37.5; 2 TABLET, FILM COATED ORAL at 07:05

## 2018-05-18 RX ADMIN — HYDRALAZINE HYDROCHLORIDE AND ISOSORBIDE DINITRATE 2 TABLET: 37.5; 2 TABLET, FILM COATED ORAL at 02:05

## 2018-05-18 RX ADMIN — HEPARIN SODIUM 18.97 UNITS/KG/HR: 10000 INJECTION, SOLUTION INTRAVENOUS at 07:05

## 2018-05-18 RX ADMIN — IPRATROPIUM BROMIDE AND ALBUTEROL SULFATE 3 ML: .5; 3 SOLUTION RESPIRATORY (INHALATION) at 11:05

## 2018-05-18 RX ADMIN — PANTOPRAZOLE SODIUM 40 MG: 40 TABLET, DELAYED RELEASE ORAL at 07:05

## 2018-05-18 RX ADMIN — HYDRALAZINE HYDROCHLORIDE 10 MG: 20 INJECTION INTRAMUSCULAR; INTRAVENOUS at 10:05

## 2018-05-18 RX ADMIN — FERROUS SULFATE TAB EC 325 MG (65 MG FE EQUIVALENT) 325 MG: 325 (65 FE) TABLET DELAYED RESPONSE at 07:05

## 2018-05-18 RX ADMIN — CLONIDINE HYDROCHLORIDE 0.3 MG: 0.1 TABLET ORAL at 02:05

## 2018-05-18 RX ADMIN — MEMANTINE HYDROCHLORIDE 10 MG: 5 TABLET ORAL at 07:05

## 2018-05-18 RX ADMIN — IPRATROPIUM BROMIDE AND ALBUTEROL SULFATE 3 ML: .5; 3 SOLUTION RESPIRATORY (INHALATION) at 04:05

## 2018-05-18 RX ADMIN — CLONIDINE HYDROCHLORIDE 0.3 MG: 0.1 TABLET ORAL at 09:05

## 2018-05-18 RX ADMIN — POTASSIUM & SODIUM PHOSPHATES POWDER PACK 280-160-250 MG 2 PACKET: 280-160-250 PACK at 10:05

## 2018-05-18 RX ADMIN — HYDRALAZINE HYDROCHLORIDE 10 MG: 20 INJECTION INTRAMUSCULAR; INTRAVENOUS at 06:05

## 2018-05-18 RX ADMIN — CLONIDINE HYDROCHLORIDE 0.3 MG: 0.1 TABLET ORAL at 07:05

## 2018-05-18 RX ADMIN — LABETALOL HYDROCHLORIDE 10 MG: 5 INJECTION INTRAVENOUS at 12:05

## 2018-05-18 RX ADMIN — AMLODIPINE BESYLATE 5 MG: 5 TABLET ORAL at 07:05

## 2018-05-18 RX ADMIN — DUTASTERIDE 0.5 MG: 0.5 CAPSULE, LIQUID FILLED ORAL at 07:05

## 2018-05-18 RX ADMIN — SODIUM BICARBONATE 650 MG TABLET 650 MG: at 02:05

## 2018-05-18 RX ADMIN — SODIUM BICARBONATE 650 MG TABLET 650 MG: at 09:05

## 2018-05-18 RX ADMIN — FERROUS SULFATE TAB EC 325 MG (65 MG FE EQUIVALENT) 325 MG: 325 (65 FE) TABLET DELAYED RESPONSE at 09:05

## 2018-05-18 RX ADMIN — CARVEDILOL 25 MG: 25 TABLET, FILM COATED ORAL at 07:05

## 2018-05-18 RX ADMIN — HYDRALAZINE HYDROCHLORIDE AND ISOSORBIDE DINITRATE 2 TABLET: 37.5; 2 TABLET, FILM COATED ORAL at 09:05

## 2018-05-18 RX ADMIN — CARVEDILOL 25 MG: 25 TABLET, FILM COATED ORAL at 05:05

## 2018-05-18 RX ADMIN — POLYETHYLENE GLYCOL 3350 17 G: 17 POWDER, FOR SOLUTION ORAL at 07:05

## 2018-05-18 RX ADMIN — IPRATROPIUM BROMIDE AND ALBUTEROL SULFATE 3 ML: .5; 3 SOLUTION RESPIRATORY (INHALATION) at 07:05

## 2018-05-18 RX ADMIN — MEMANTINE HYDROCHLORIDE 10 MG: 5 TABLET ORAL at 09:05

## 2018-05-18 RX ADMIN — SODIUM BICARBONATE 650 MG TABLET 650 MG: at 07:05

## 2018-05-18 NOTE — NURSING
Dr.Fuhrman london. Call returned by Dr.D. Ford. MD notified of pt elevated BP despite several anti-hypertensives being administered. Telephone ordered received for IV labetalol. Will continue to monitor closely.

## 2018-05-18 NOTE — PLAN OF CARE
Problem: Patient Care Overview  Goal: Plan of Care Review  Outcome: Ongoing (interventions implemented as appropriate)  Plan of Care reviewed w/ pt and family at bedside. SBPs elevated to 170s, PRN Hydralazine given pending effect. Abdomen distended, KUB done, no changes noted. Heparin gtt at 18.9u/kg/hr. Tolerating renal diet. Carney d/c'ed this shift, unable to void, bladder scanned for 611cc. Carney replaced this evening per MD order. CPAP at night. Ambulates w/ x1 assist and RW. Accuchecks AC/HS.

## 2018-05-18 NOTE — ASSESSMENT & PLAN NOTE
Patient is 83 yo M who presents with large necrotic left adrenal mass    -renal diet  -pain/nausea meds PRN  -bowel regimen - miralax  -f/u urine studies  -DVT/GI prophylaxis  -PT/OT  -plan for surgery 5/21/18

## 2018-05-18 NOTE — PLAN OF CARE
Problem: Diabetes, Type 2 (Adult)  Goal: Signs and Symptoms of Listed Potential Problems Will be Absent, Minimized or Managed (Diabetes, Type 2)  Signs and symptoms of listed potential problems will be absent, minimized or managed by discharge/transition of care (reference Diabetes, Type 2 (Adult) CPG).   No insulin required during this shift.     Problem: Fall Risk (Adult)  Goal: Identify Related Risk Factors and Signs and Symptoms  Related risk factors and signs and symptoms are identified upon initiation of Human Response Clinical Practice Guideline (CPG)   No falls during this shift.

## 2018-05-18 NOTE — ANESTHESIA PREPROCEDURE EVALUATION
"[facility]  Anesthesia Pre-Operative Evaluation         Patient Name: Jose Cuadra  YOB: 1935  MRN: [id number]    SUBJECTIVE:     Pre-operative evaluation for Procedure(s) (LRB):  ADRENALECTOMY (N/A)     05/18/2018    Jose Cuadra is a 82 y.o. male w/ a significant PMHx of dementia, RCC, possible colon CA, CKD 4, Afib with pacemaker, and CHF with EF 36% who presented to OSH 5/2/18 with pneumonia and during his admission was found to have a large adrenal mass that was enlarged from previous CT scan. He was transferred to [facility] for further workup. He has completed his course of abx for pneumonia. He is currently satting >98 on RA. Patient now presents for the above procedure(s).      LDA:        Peripheral IV - Single Lumen 05/16/18 0000 Left Upper Arm (Active)   Site Assessment Clean;Dry;Intact;No redness;No swelling 5/17/2018  7:24 PM   Line Status Infusing 5/17/2018  7:24 PM   Dressing Status Clean;Dry;Intact 5/17/2018  7:24 PM   Dressing Intervention New dressing 5/16/2018 12:00 AM   Dressing Change Due 05/20/18 5/17/2018  7:24 PM   Site Change Due 05/20/18 5/17/2018  7:24 PM   Reason Not Rotated Not due 5/17/2018  7:24 PM   Number of days: 2            Urethral Catheter 05/17/18 1915 (Active)   Site Assessment Clean;Intact 5/17/2018  7:24 PM   Collection Container Standard drainage bag 5/17/2018  7:24 PM   Securement Method secured to top of thigh w/ adhesive device 5/17/2018  7:24 PM   Catheter Care Performed yes 5/17/2018  7:24 PM   Reason for Continuing Urinary Catheterization Urinary retention 5/17/2018  7:24 PM   CAUTI Prevention Bundle StatLock in place w 1" slack;Intact seal between catheter & drainage tubing;Drainage bag off the floor;Green sheeting clip in use;No dependent loops or kinks;Drainage bag not overfilled (<2/3 full);Drainage bag below bladder 5/17/2018  7:24 PM   Output (mL) 275 mL 5/18/2018  6:00 AM   Number of days: 0       Prev airway: None " documented.    Drips:    heparin (porcine) in D5W 18.97 Units/kg/hr (05/17/18 6116)       Patient Active Problem List   Diagnosis    Acute renal failure superimposed on stage 4 chronic kidney disease    Syncope    Chronic atrial fibrillation    Essential (primary) hypertension    Microcytic anemia    Persistent proteinuria    Thyroid nodule    Type 2 diabetes mellitus with diabetic peripheral angiopathy without gangrene, without long-term current use of insulin    Metabolic acidosis, normal anion gap (NAG)    Non-ischemic cardiomyopathy    Alteration in nutrition    Chronic blood loss anemia    Left adrenal mass    Diabetic kidney disease    Solitary kidney, acquired    Malnutrition of moderate degree    Pneumonia of right lower lobe due to infectious organism    Acute on chronic congestive heart failure    Chronic renal impairment    CKD (chronic kidney disease) stage 4, GFR 15-29 ml/min    Diabetes    Hypernatremia    Hypokalemia    History of anemia due to chronic kidney disease    Adrenal tumor    Urinary retention    Dementia       Review of patient's allergies indicates:  No Known Allergies    Current Inpatient Medications:   albuterol-ipratropium  3 mL Nebulization Q8H    amLODIPine  5 mg Oral Daily    carvedilol  25 mg Oral BID WM    cloNIDine  0.3 mg Oral TID    dutasteride  0.5 mg Oral Daily    ferrous sulfate  325 mg Oral BID    isosorbide-hydrALAZINE 20-37.5 mg  2 tablet Oral TID    memantine  10 mg Oral BID    pantoprazole  40 mg Oral Daily    polyethylene glycol  17 g Oral Daily    sodium bicarbonate  1 tablet Oral TID       No current facility-administered medications on file prior to encounter.      Current Outpatient Prescriptions on File Prior to Encounter   Medication Sig Dispense Refill    acetaminophen (TYLENOL) 325 MG tablet Take 1 tablet (325 mg total) by mouth every 6 (six) hours as needed.  0    albuterol-ipratropium 2.5mg-0.5mg/3mL (DUO-NEB) 0.5 mg-3  mg(2.5 mg base)/3 mL nebulizer solution Take 3 mLs by nebulization every 8 (eight) hours. Rescue 1 Box 0    amLODIPine (NORVASC) 5 MG tablet Take 1 tablet (5 mg total) by mouth once daily. 30 tablet 11    carvedilol (COREG) 25 MG tablet Take 1 tablet (25 mg total) by mouth 2 (two) times daily with meals. 60 tablet 11    cloNIDine (CATAPRES) 0.3 MG tablet Take 1 tablet (0.3 mg total) by mouth 3 (three) times daily. 90 tablet 11    dutasteride (AVODART) 0.5 mg capsule Take 0.5 mg by mouth once daily.      ferrous sulfate 325 (65 FE) MG EC tablet Take 1 tablet (325 mg total) by mouth 2 (two) times daily. (Patient taking differently: Take 325 mg by mouth 2 (two) times daily. For 50 days) 90 tablet 0    hydrALAZINE (APRESOLINE) 20 mg/mL injection Inject 0.25 mLs (5 mg total) into the vein every 6 (six) hours as needed (170mmhg).      isosorbide-hydrALAZINE 20-37.5 mg (BIDIL) 20-37.5 mg Tab Take 2 tablets by mouth 3 (three) times daily. 180 tablet 11    memantine (NAMENDA) 10 MG Tab Take 10 mg by mouth 2 (two) times daily.       nut.tx.imp.renal fxn,lac-reduc (SUPLENA CARB STEADY) 0.04 gram-1.8 kcal/mL Liqd 1 can tid 90 Can 11    olmesartan (BENICAR) 20 MG tablet Take 1 tablet (20 mg total) by mouth once daily. 30 tablet 5    ondansetron 4 mg/2 mL Soln Inject 4 mg into the vein every 6 (six) hours as needed.      pantoprazole (PROTONIX) 40 MG tablet Take 1 tablet (40 mg total) by mouth once daily. 30 tablet 11    polyethylene glycol (GLYCOLAX) 17 gram PwPk Take 17 g by mouth once daily.  0    sodium bicarbonate 650 MG tablet Take 1 tablet (650 mg total) by mouth 3 (three) times daily. 90 tablet 11       Past Surgical History:   Procedure Laterality Date    CARDIAC PACEMAKER PLACEMENT      CARDIAC PACEMAKER PLACEMENT Left 12/09/2014    COLON SURGERY      COLONOSCOPY N/A 3/26/2018    Procedure: COLONOSCOPY;  Surgeon: Jonas Belle MD;  Location: [facility];  Service: Endoscopy;  Laterality: N/A;     KIDNEY SURGERY Right     for cancer       Social History     Social History    Marital status:      Spouse name: N/A    Number of children: N/A    Years of education: N/A     Occupational History    Not on file.     Social History Main Topics    Smoking status: Light Tobacco Smoker    Smokeless tobacco: Never Used      Comment: no cigarettes in 6 weeks approximately    Alcohol use No    Drug use: No    Sexual activity: No     Other Topics Concern    Not on file     Social History Narrative    No narrative on file       OBJECTIVE:     Vital Signs Range (Last 24H):  Temp:  [35.9 °C (96.6 °F)-36.9 °C (98.4 °F)]   Pulse:  [69-79]   Resp:  [16-20]   BP: (153-190)/(74-99)   SpO2:  [94 %-99 %]       CBC:   Recent Labs      18   0328  18   0329   WBC  6.41  6.79   RBC  3.18*  3.32*   HGB  8.4*  8.8*   HCT  27.5*  28.6*   PLT  246  242   MCV  87  86   MCH  26.4*  26.5*   MCHC  30.5*  30.8*       CMP:   Recent Labs      18   0328  18   0329   NA  139  140   K  3.9  3.7   CL  108  106   CO2  23  25   BUN  22  19   CREATININE  2.6*  2.5*   GLU  164*  142*   MG  2.0  1.8   PHOS  2.6*  2.4*   CALCIUM  9.3  9.4   ALBUMIN  2.0*  2.1*   PROT  6.6  6.7   ALKPHOS  167*  164*   ALT  5*  6*   AST  14  11   BILITOT  0.6  0.7       INR:  No results for input(s): PT, INR, PROTIME, APTT in the last 72 hours.    Diagnostic Studies:     EK/2/18    Test Reason : R06.02,  Vent. Rate : 070 BPM     Atrial Rate : 068 BPM     P-R Int : 000 ms          QRS Dur : 172 ms      QT Int : 512 ms       P-R-T Axes : 000 -53 077 degrees     QTc Int : 552 ms    Ventricular-paced rhythm  Abnormal ECG  When compared with ECG of 21-MAR-2018 14:17,  No significant change was found  Confirmed by Loretta OLIVAS, Ab ([id number]) on 2018 2:50:04 PM    2D ECHO:    3/22/18    Final Impressions  1. The study quality is good.   2. Global left ventricular systolic function is moderately decreased.   The left ventricular  ejection fraction is 36%.   3. Left ventricular diastolic function is indeterminate.   4. Left ventricular diastolic septal thickness is 1.8 cms. Left   ventricular diastolic postero basal free wall thickness is 1.8 cms.   Noted left ventricular hypertrophy. It is moderate.   5. Patient appears to have Moderate aortic valve stenosis. JOON (VTI)   is 1.58 cm^2.   6. Mild calcification of the aortic valve is noted with reduced cuspal   excursion. Mild mitral annular calcification is noted.   7. The left atrium is moderately enlarged. Left atrial diameter is 5.1   cms.    8. Mild to moderate (1-2+) tricuspid regurgitation. Mild (1+) mitral   regurgitation.    9. No bubbles were visualized in the left heart chambers at rest or   with valsalva. The bubble study was negative, ruling out patent   foramen ovale.    10. Subcostals were not obtained.  11. Patient has a pacemaker.  12. The pulmonary artery systolic pressure is 39 mmHg.      ASSESSMENT/PLAN:         Anesthesia Evaluation    I have reviewed the Patient Summary Reports.    I have reviewed the Nursing Notes.      Review of Systems  Anesthesia Hx:  History of prior surgery of interest to airway management or planning: Denies Family Hx of Anesthesia complications.   Denies Personal Hx of Anesthesia complications.   Hematology/Oncology:         -- Cancer in past history:   EENT/Dental:EENT/Dental Normal   Cardiovascular:   Pacemaker Hypertension CAD  Dysrhythmias atrial fibrillation CHF    Pulmonary:   Pneumonia (resolving)    Renal/:   Chronic Renal Disease, CRI  RCC    Adrenal mass   Hepatic/GI:   GERD    Musculoskeletal:  Musculoskeletal Normal    Neurological:  Dementia    Endocrine:   Diabetes    Psych:   Psychiatric History          Physical Exam  General:  Obesity    Airway/Jaw/Neck:  Airway Findings: Mouth Opening: Normal Tongue: Normal  General Airway Assessment: Adult  Mallampati: I  TM Distance: Normal, at least 6 cm  Jaw/Neck Findings:  Neck ROM: Normal  ROM  Neck Findings:  Girth Increased     Eyes/Ears/Nose:  EYES/EARS/NOSE FINDINGS: Normal   Dental:  Dental Findings: Edentulous   Chest/Lungs:  Chest/Lungs Findings: Clear to auscultation, Normal Respiratory Rate     Heart/Vascular:  Heart Findings: Rate: Normal  Rhythm: Irregularly Irregular     Abdomen:  Abdomen Findings:  Normal     Musculoskeletal:  Musculoskeletal Findings: Normal   Skin:  Skin Findings: Normal    Mental Status:  Mental Status Findings:  Cooperative         Anesthesia Plan  Type of Anesthesia, risks & benefits discussed:  Anesthesia Type:  general  Patient's Preference:   Intra-op Monitoring Plan: standard ASA monitors and arterial line  Intra-op Monitoring Plan Comments:   Post Op Pain Control Plan: multimodal analgesia and per primary service following discharge from PACU  Post Op Pain Control Plan Comments:   Induction:   IV  Beta Blocker:  Patient is on a Beta-Blocker and has received one dose within the past 24 hours (No further documentation required).       Informed Consent: Patient understands risks and agrees with Anesthesia plan.  Questions answered. Anesthesia consent signed with patient.  ASA Score: 4     Day of Surgery Review of History & Physical:    H&P update referred to the surgeon.         Ready For Surgery From Anesthesia Perspective.

## 2018-05-18 NOTE — PROGRESS NOTES
"[facility]  General Surgery  Progress Note    Subjective:     History of Present Illness:  Patient is an 81 yo M with history of dementia, RCC, possible colon CA, CKD 4, Afib with pacemaker, CHF with EF 36%, and a large abdominal (likely adrenal) mass who presented to OSH 5/2/18 with pneumonia and during his admission was found to have a large adrenal mass that was enlarged from previous CT scan, and he was transferred here for further workup. A CT scan from 2016 showed a similar sized mass. He underwent biopsy which showed no obvious malignancy, mostly necrotic tissue. He has completed his course of abx for pneumonia and his SOA has resolved.    When asked, he states the mass has been present for years. He denies any nausea, vomiting, weight loss, fevers, or chills. He continues to have bowel movements, with bowel incontinence (he says that's his baseline). He has been off anticoagulation despite being in afib. He has been having issues urinating.    He has undergone right nephrectomy and colon resection of 22 cm "maybe for cancer".    Post-Op Info:  Procedure(s) (LRB):  ADRENALECTOMY (N/A)         Interval History:   Patient seen and examined, no acute events overnight  Tolerating minimal diet  Denies abdominal pain  +F/no BM  Afebrile/baseline hypertensive    Medications:  Continuous Infusions:   heparin (porcine) in D5W 18.967 Units/kg/hr (05/18/18 9995)     Scheduled Meds:   albuterol-ipratropium  3 mL Nebulization Q8H    amLODIPine  5 mg Oral Daily    carvedilol  25 mg Oral BID WM    cloNIDine  0.3 mg Oral TID    dutasteride  0.5 mg Oral Daily    ferrous sulfate  325 mg Oral BID    isosorbide-hydrALAZINE 20-37.5 mg  2 tablet Oral TID    memantine  10 mg Oral BID    pantoprazole  40 mg Oral Daily    polyethylene glycol  17 g Oral Daily    sodium bicarbonate  1 tablet Oral TID     PRN Meds:acetaminophen, dextrose 50%, dextrose 50%, diphenhydrAMINE, glucagon (human recombinant), " glucose, glucose, heparin (PORCINE), heparin (PORCINE), hydrALAZINE, insulin aspart U-100, ondansetron, promethazine (PHENERGAN) IVPB, sodium chloride 0.9%     Review of patient's allergies indicates:  No Known Allergies  Objective:     Vital Signs (Most Recent):  Temp: 96.7 °F (35.9 °C) (05/18/18 0733)  Pulse: 70 (05/18/18 0733)  Resp: 16 (05/18/18 0733)  BP: (!) 203/102 (05/18/18 0733)  SpO2: 97 % (05/18/18 0733) Vital Signs (24h Range):  Temp:  [96.6 °F (35.9 °C)-98.4 °F (36.9 °C)] 96.7 °F (35.9 °C)  Pulse:  [69-79] 70  Resp:  [16-20] 16  SpO2:  [94 %-99 %] 97 %  BP: (153-203)/() 203/102     Weight: 106.5 kg (234 lb 12.8 oz)  Body mass index is 29.35 kg/m².    Intake/Output - Last 3 Shifts       05/16 0700 - 05/17 0659 05/17 0700 - 05/18 0659 05/18 0700 - 05/19 0659    P.O. 1360 1160     I.V. (mL/kg) 464.6 (4.4) 404 (3.8)     Total Intake(mL/kg) 1824.6 (17.1) 1564 (14.7)     Urine (mL/kg/hr) 1075 (0.4) 1475 (0.6)     Total Output 1075 1475      Net +749.6 +89             Stool Occurrence 1 x 3 x           Physical Exam   Constitutional: He appears well-developed and well-nourished. No distress.   HENT:   Head: Normocephalic and atraumatic.   Cardiovascular: Normal rate.    Pulmonary/Chest: Effort normal. No respiratory distress.   Abdominal:   Soft, non-tender, large, hard, nontender LUQ mass    Genitourinary:   Genitourinary Comments: Carney in place   Neurological: He is alert.   Skin: Skin is warm and dry. He is not diaphoretic.   Psychiatric: He has a normal mood and affect. His behavior is normal.       Significant Labs:  CBC:   Recent Labs  Lab 05/18/18 0329   WBC 6.79   RBC 3.32*   HGB 8.8*   HCT 28.6*      MCV 86   MCH 26.5*   MCHC 30.8*     BMP:   Recent Labs  Lab 05/18/18 0329   *      K 3.7      CO2 25   BUN 19   CREATININE 2.5*   CALCIUM 9.4   MG 1.8     CMP:   Recent Labs  Lab 05/18/18 0329   *   CALCIUM 9.4   ALBUMIN 2.1*   PROT 6.7      K 3.7   CO2 25  "     BUN 19   CREATININE 2.5*   ALKPHOS 164*   ALT 6*   AST 11   BILITOT 0.7     LFTs:   Recent Labs  Lab 05/18/18  0329   ALT 6*   AST 11   ALKPHOS 164*   BILITOT 0.7   PROT 6.7   ALBUMIN 2.1*     Coagulation:   Recent Labs  Lab 05/14/18  2051   LABPROT 11.0   INR 1.1   APTT 24.7     Assessment/Plan:     * Non-ischemic cardiomyopathy    Home meds  -amlodipine, clonidine, carvedilol, bidil          Dementia    Home meds  -memantine        Urinary retention    Home meds  -dutasteride  -calabrese        Adrenal tumor    Patient is 81 yo M who presents with large necrotic left adrenal mass    -renal diet  -pain/nausea meds PRN  -bowel regimen - miralax  -f/u urine studies  -DVT/GI prophylaxis  -PT/OT  -plan for surgery 5/21/18        Type 2 diabetes mellitus with diabetic peripheral angiopathy without gangrene, without long-term current use of insulin    SSI, accuchecks        Microcytic anemia    Iron supplement        Chronic atrial fibrillation    Tele  Heparin gtt  Cards clearance prior to possible surgical intervention, appreciate assistance  "Moderate risk - Cardiology would do nothing more to prep this patient for said surgery."            Lexi Tolentino PA-C   [id number]  General Surgery  [facility]  "

## 2018-05-18 NOTE — SUBJECTIVE & OBJECTIVE
Interval History:   Patient seen and examined, no acute events overnight  Tolerating minimal diet  Denies abdominal pain  +F/no BM  Afebrile/baseline hypertensive    Medications:  Continuous Infusions:   heparin (porcine) in D5W 18.967 Units/kg/hr (05/18/18 0747)     Scheduled Meds:   albuterol-ipratropium  3 mL Nebulization Q8H    amLODIPine  5 mg Oral Daily    carvedilol  25 mg Oral BID WM    cloNIDine  0.3 mg Oral TID    dutasteride  0.5 mg Oral Daily    ferrous sulfate  325 mg Oral BID    isosorbide-hydrALAZINE 20-37.5 mg  2 tablet Oral TID    memantine  10 mg Oral BID    pantoprazole  40 mg Oral Daily    polyethylene glycol  17 g Oral Daily    sodium bicarbonate  1 tablet Oral TID     PRN Meds:acetaminophen, dextrose 50%, dextrose 50%, diphenhydrAMINE, glucagon (human recombinant), glucose, glucose, heparin (PORCINE), heparin (PORCINE), hydrALAZINE, insulin aspart U-100, ondansetron, promethazine (PHENERGAN) IVPB, sodium chloride 0.9%     Review of patient's allergies indicates:  No Known Allergies  Objective:     Vital Signs (Most Recent):  Temp: 96.7 °F (35.9 °C) (05/18/18 0733)  Pulse: 70 (05/18/18 0733)  Resp: 16 (05/18/18 0733)  BP: (!) 203/102 (05/18/18 0733)  SpO2: 97 % (05/18/18 0733) Vital Signs (24h Range):  Temp:  [96.6 °F (35.9 °C)-98.4 °F (36.9 °C)] 96.7 °F (35.9 °C)  Pulse:  [69-79] 70  Resp:  [16-20] 16  SpO2:  [94 %-99 %] 97 %  BP: (153-203)/() 203/102     Weight: 106.5 kg (234 lb 12.8 oz)  Body mass index is 29.35 kg/m².    Intake/Output - Last 3 Shifts       05/16 0700 - 05/17 0659 05/17 0700 - 05/18 0659 05/18 0700 - 05/19 0659    P.O. 1360 1160     I.V. (mL/kg) 464.6 (4.4) 404 (3.8)     Total Intake(mL/kg) 1824.6 (17.1) 1564 (14.7)     Urine (mL/kg/hr) 1075 (0.4) 1475 (0.6)     Total Output 1075 1475      Net +749.6 +89             Stool Occurrence 1 x 3 x           Physical Exam   Constitutional: He appears well-developed and well-nourished. No distress.   HENT:   Head:  Normocephalic and atraumatic.   Cardiovascular: Normal rate.    Pulmonary/Chest: Effort normal. No respiratory distress.   Abdominal:   Soft, non-tender, large, hard, nontender LUQ mass    Genitourinary:   Genitourinary Comments: Carney in place   Neurological: He is alert.   Skin: Skin is warm and dry. He is not diaphoretic.   Psychiatric: He has a normal mood and affect. His behavior is normal.       Significant Labs:  CBC:   Recent Labs  Lab 05/18/18 0329   WBC 6.79   RBC 3.32*   HGB 8.8*   HCT 28.6*      MCV 86   MCH 26.5*   MCHC 30.8*     BMP:   Recent Labs  Lab 05/18/18 0329   *      K 3.7      CO2 25   BUN 19   CREATININE 2.5*   CALCIUM 9.4   MG 1.8     CMP:   Recent Labs  Lab 05/18/18 0329   *   CALCIUM 9.4   ALBUMIN 2.1*   PROT 6.7      K 3.7   CO2 25      BUN 19   CREATININE 2.5*   ALKPHOS 164*   ALT 6*   AST 11   BILITOT 0.7     LFTs:   Recent Labs  Lab 05/18/18  0329   ALT 6*   AST 11   ALKPHOS 164*   BILITOT 0.7   PROT 6.7   ALBUMIN 2.1*     Coagulation:   Recent Labs  Lab 05/14/18  2051   LABPROT 11.0   INR 1.1   APTT 24.7

## 2018-05-18 NOTE — PHYSICIAN QUERY
PT Name: Jose Cuadra  MR #: [id number]     Physician Query Form - Documentation Clarification      CDS/: Brandy E Capley               Contact information:  Spectralink:  [phone]    This form is a permanent document in the medical record.     Query Date: May 18, 2018    By submitting this query, we are merely seeking further clarification of documentation. Please utilize your independent clinical judgment when addressing the question(s) below.    The Medical record reflects the following:    Supporting Clinical Findings Location in Medical Record     Phosphorus= 2.4     Labs 5/18     potassium, sodium phosphates 280-160-250 mg packet 2 packet  :  Dose 2 packet  :  Oral  :  Once       MAR 5/18                                                                            Doctor, Please specify diagnosis or diagnoses associated with above clinical findings.    Provider Use Only       ( x)  Hypophosphatemia     (  )  Other (please specify):  _________________________                                                                                                             [  ] Clinically undetermined

## 2018-05-18 NOTE — SUBJECTIVE & OBJECTIVE
Interval History:   NAEON. Tolerating diet. Hemodynamically stable.     Medications:  Continuous Infusions:   heparin (porcine) in D5W 18.967 Units/kg/hr (05/18/18 0747)     Scheduled Meds:   albuterol-ipratropium  3 mL Nebulization Q8H    amLODIPine  5 mg Oral Daily    carvedilol  25 mg Oral BID WM    cloNIDine  0.3 mg Oral TID    dutasteride  0.5 mg Oral Daily    ferrous sulfate  325 mg Oral BID    isosorbide-hydrALAZINE 20-37.5 mg  2 tablet Oral TID    memantine  10 mg Oral BID    pantoprazole  40 mg Oral Daily    polyethylene glycol  17 g Oral Daily    potassium, sodium phosphates  2 packet Oral Once    sodium bicarbonate  1 tablet Oral TID     PRN Meds:acetaminophen, dextrose 50%, dextrose 50%, diphenhydrAMINE, glucagon (human recombinant), glucose, glucose, heparin (PORCINE), heparin (PORCINE), hydrALAZINE, insulin aspart U-100, ondansetron, promethazine (PHENERGAN) IVPB, sodium chloride 0.9%     Review of patient's allergies indicates:  No Known Allergies  Objective:     Vital Signs (Most Recent):  Temp: 96.7 °F (35.9 °C) (05/18/18 0733)  Pulse: 70 (05/18/18 0733)  Resp: 16 (05/18/18 0733)  BP: (!) 203/102 (05/18/18 0733)  SpO2: 97 % (05/18/18 0733) Vital Signs (24h Range):  Temp:  [96.6 °F (35.9 °C)-98.4 °F (36.9 °C)] 96.7 °F (35.9 °C)  Pulse:  [69-79] 70  Resp:  [16-20] 16  SpO2:  [94 %-99 %] 97 %  BP: (153-203)/() 203/102     Weight: 106.5 kg (234 lb 12.8 oz)  Body mass index is 29.35 kg/m².    Intake/Output - Last 3 Shifts       05/16 0700 - 05/17 0659 05/17 0700 - 05/18 0659 05/18 0700 - 05/19 0659    P.O. 1360 1160     I.V. (mL/kg) 464.6 (4.4) 404 (3.8)     Total Intake(mL/kg) 1824.6 (17.1) 1564 (14.7)     Urine (mL/kg/hr) 1075 (0.4) 1475 (0.6)     Total Output 1075 1475      Net +749.6 +89             Stool Occurrence 1 x 3 x           Physical Exam   Constitutional: He is oriented to person, place, and time. He appears well-developed and well-nourished. No distress.    Cardiovascular: Normal rate and regular rhythm.    Pulmonary/Chest: Effort normal. No respiratory distress.   Abdominal: Soft. He exhibits mass. He exhibits no distension. There is no tenderness. There is no rebound and no guarding.   Neurological: He is alert and oriented to person, place, and time.   Skin: Skin is warm and dry.   Psychiatric: He has a normal mood and affect. His behavior is normal.       Significant Labs:  CBC:   Recent Labs  Lab 05/18/18  0329   WBC 6.79   RBC 3.32*   HGB 8.8*   HCT 28.6*      MCV 86   MCH 26.5*   MCHC 30.8*     BMP:   Recent Labs  Lab 05/18/18  0329   *      K 3.7      CO2 25   BUN 19   CREATININE 2.5*   CALCIUM 9.4   MG 1.8

## 2018-05-19 LAB
ALBUMIN SERPL BCP-MCNC: 2.1 G/DL
ALP SERPL-CCNC: 168 U/L
ALT SERPL W/O P-5'-P-CCNC: <5 U/L
ANION GAP SERPL CALC-SCNC: 9 MMOL/L
AST SERPL-CCNC: 10 U/L
BASOPHILS # BLD AUTO: 0.06 K/UL
BASOPHILS NFR BLD: 0.9 %
BILIRUB SERPL-MCNC: 0.5 MG/DL
BUN SERPL-MCNC: 22 MG/DL
CALCIUM SERPL-MCNC: 8.4 MG/DL
CHLORIDE SERPL-SCNC: 106 MMOL/L
CO2 SERPL-SCNC: 22 MMOL/L
CREAT SERPL-MCNC: 2.6 MG/DL
DIFFERENTIAL METHOD: ABNORMAL
EOSINOPHIL # BLD AUTO: 0.1 K/UL
EOSINOPHIL NFR BLD: 2 %
ERYTHROCYTE [DISTWIDTH] IN BLOOD BY AUTOMATED COUNT: 19.4 %
EST. GFR  (AFRICAN AMERICAN): 25.8 ML/MIN/1.73 M^2
EST. GFR  (NON AFRICAN AMERICAN): 22.3 ML/MIN/1.73 M^2
FACT X PPP CHRO-ACNC: 0.26 IU/ML
FACT X PPP CHRO-ACNC: 0.35 IU/ML
GLUCOSE SERPL-MCNC: 127 MG/DL
HCT VFR BLD AUTO: 27.7 %
HGB BLD-MCNC: 8.5 G/DL
IMM GRANULOCYTES # BLD AUTO: 0.05 K/UL
IMM GRANULOCYTES NFR BLD AUTO: 0.8 %
LYMPHOCYTES # BLD AUTO: 1 K/UL
LYMPHOCYTES NFR BLD: 16 %
MAGNESIUM SERPL-MCNC: 1.9 MG/DL
MCH RBC QN AUTO: 26.4 PG
MCHC RBC AUTO-ENTMCNC: 30.7 G/DL
MCV RBC AUTO: 86 FL
MONOCYTES # BLD AUTO: 0.7 K/UL
MONOCYTES NFR BLD: 10.5 %
NEUTROPHILS # BLD AUTO: 4.5 K/UL
NEUTROPHILS NFR BLD: 69.8 %
NRBC BLD-RTO: 0 /100 WBC
PHOSPHATE SERPL-MCNC: 2.7 MG/DL
PLATELET # BLD AUTO: 260 K/UL
PMV BLD AUTO: 12.8 FL
POCT GLUCOSE: 144 MG/DL (ref 70–110)
POCT GLUCOSE: 153 MG/DL (ref 70–110)
POCT GLUCOSE: 166 MG/DL (ref 70–110)
POCT GLUCOSE: 179 MG/DL (ref 70–110)
POCT GLUCOSE: 201 MG/DL (ref 70–110)
POTASSIUM SERPL-SCNC: 3.9 MMOL/L
PROT SERPL-MCNC: 6.8 G/DL
RBC # BLD AUTO: 3.22 M/UL
SODIUM SERPL-SCNC: 137 MMOL/L
WBC # BLD AUTO: 6.5 K/UL

## 2018-05-19 PROCEDURE — 36415 COLL VENOUS BLD VENIPUNCTURE: CPT

## 2018-05-19 PROCEDURE — 25000003 PHARM REV CODE 250: Performed by: STUDENT IN AN ORGANIZED HEALTH CARE EDUCATION/TRAINING PROGRAM

## 2018-05-19 PROCEDURE — 80053 COMPREHEN METABOLIC PANEL: CPT

## 2018-05-19 PROCEDURE — 83735 ASSAY OF MAGNESIUM: CPT

## 2018-05-19 PROCEDURE — 20600001 HC STEP DOWN PRIVATE ROOM

## 2018-05-19 PROCEDURE — 94761 N-INVAS EAR/PLS OXIMETRY MLT: CPT

## 2018-05-19 PROCEDURE — 99900035 HC TECH TIME PER 15 MIN (STAT)

## 2018-05-19 PROCEDURE — 84100 ASSAY OF PHOSPHORUS: CPT

## 2018-05-19 PROCEDURE — 85520 HEPARIN ASSAY: CPT

## 2018-05-19 PROCEDURE — 63600175 PHARM REV CODE 636 W HCPCS: Performed by: STUDENT IN AN ORGANIZED HEALTH CARE EDUCATION/TRAINING PROGRAM

## 2018-05-19 PROCEDURE — 85025 COMPLETE CBC W/AUTO DIFF WBC: CPT

## 2018-05-19 PROCEDURE — 25000242 PHARM REV CODE 250 ALT 637 W/ HCPCS: Performed by: STUDENT IN AN ORGANIZED HEALTH CARE EDUCATION/TRAINING PROGRAM

## 2018-05-19 PROCEDURE — 94640 AIRWAY INHALATION TREATMENT: CPT

## 2018-05-19 RX ORDER — LABETALOL HYDROCHLORIDE 5 MG/ML
20 INJECTION, SOLUTION INTRAVENOUS EVERY 4 HOURS PRN
Status: DISCONTINUED | OUTPATIENT
Start: 2018-05-19 | End: 2018-05-28 | Stop reason: HOSPADM

## 2018-05-19 RX ORDER — HYDRALAZINE HYDROCHLORIDE 20 MG/ML
10 INJECTION INTRAMUSCULAR; INTRAVENOUS EVERY 8 HOURS PRN
Status: DISCONTINUED | OUTPATIENT
Start: 2018-05-19 | End: 2018-05-21

## 2018-05-19 RX ORDER — AMLODIPINE BESYLATE 10 MG/1
10 TABLET ORAL DAILY
Status: DISCONTINUED | OUTPATIENT
Start: 2018-05-19 | End: 2018-05-23

## 2018-05-19 RX ORDER — HYDRALAZINE HYDROCHLORIDE 20 MG/ML
10 INJECTION INTRAMUSCULAR; INTRAVENOUS ONCE
Status: DISCONTINUED | OUTPATIENT
Start: 2018-05-19 | End: 2018-05-19

## 2018-05-19 RX ADMIN — HEPARIN SODIUM 20.94 UNITS/KG/HR: 10000 INJECTION, SOLUTION INTRAVENOUS at 09:05

## 2018-05-19 RX ADMIN — CLONIDINE HYDROCHLORIDE 0.3 MG: 0.1 TABLET ORAL at 02:05

## 2018-05-19 RX ADMIN — MEMANTINE HYDROCHLORIDE 10 MG: 5 TABLET ORAL at 09:05

## 2018-05-19 RX ADMIN — DUTASTERIDE 0.5 MG: 0.5 CAPSULE, LIQUID FILLED ORAL at 09:05

## 2018-05-19 RX ADMIN — POLYETHYLENE GLYCOL 3350 17 G: 17 POWDER, FOR SOLUTION ORAL at 09:05

## 2018-05-19 RX ADMIN — CARVEDILOL 25 MG: 25 TABLET, FILM COATED ORAL at 04:05

## 2018-05-19 RX ADMIN — INSULIN ASPART 1 UNITS: 100 INJECTION, SOLUTION INTRAVENOUS; SUBCUTANEOUS at 08:05

## 2018-05-19 RX ADMIN — CLONIDINE HYDROCHLORIDE 0.3 MG: 0.1 TABLET ORAL at 08:05

## 2018-05-19 RX ADMIN — SODIUM BICARBONATE 650 MG TABLET 650 MG: at 08:05

## 2018-05-19 RX ADMIN — HYDRALAZINE HYDROCHLORIDE 10 MG: 20 INJECTION INTRAMUSCULAR; INTRAVENOUS at 11:05

## 2018-05-19 RX ADMIN — AMLODIPINE BESYLATE 10 MG: 10 TABLET ORAL at 09:05

## 2018-05-19 RX ADMIN — CLONIDINE HYDROCHLORIDE 0.3 MG: 0.1 TABLET ORAL at 09:05

## 2018-05-19 RX ADMIN — MEMANTINE HYDROCHLORIDE 10 MG: 5 TABLET ORAL at 08:05

## 2018-05-19 RX ADMIN — IPRATROPIUM BROMIDE AND ALBUTEROL SULFATE 3 ML: .5; 3 SOLUTION RESPIRATORY (INHALATION) at 08:05

## 2018-05-19 RX ADMIN — HYDRALAZINE HYDROCHLORIDE AND ISOSORBIDE DINITRATE 2 TABLET: 37.5; 2 TABLET, FILM COATED ORAL at 08:05

## 2018-05-19 RX ADMIN — HYDRALAZINE HYDROCHLORIDE 10 MG: 20 INJECTION INTRAMUSCULAR; INTRAVENOUS at 12:05

## 2018-05-19 RX ADMIN — PANTOPRAZOLE SODIUM 40 MG: 40 TABLET, DELAYED RELEASE ORAL at 09:05

## 2018-05-19 RX ADMIN — SODIUM BICARBONATE 650 MG TABLET 650 MG: at 02:05

## 2018-05-19 RX ADMIN — FERROUS SULFATE TAB EC 325 MG (65 MG FE EQUIVALENT) 325 MG: 325 (65 FE) TABLET DELAYED RESPONSE at 08:05

## 2018-05-19 RX ADMIN — HYDRALAZINE HYDROCHLORIDE AND ISOSORBIDE DINITRATE 2 TABLET: 37.5; 2 TABLET, FILM COATED ORAL at 02:05

## 2018-05-19 RX ADMIN — SODIUM BICARBONATE 650 MG TABLET 650 MG: at 09:05

## 2018-05-19 RX ADMIN — HEPARIN SODIUM 20.96 UNITS/KG/HR: 10000 INJECTION, SOLUTION INTRAVENOUS at 10:05

## 2018-05-19 RX ADMIN — HYDRALAZINE HYDROCHLORIDE AND ISOSORBIDE DINITRATE 2 TABLET: 37.5; 2 TABLET, FILM COATED ORAL at 09:05

## 2018-05-19 RX ADMIN — IPRATROPIUM BROMIDE AND ALBUTEROL SULFATE 3 ML: .5; 3 SOLUTION RESPIRATORY (INHALATION) at 03:05

## 2018-05-19 RX ADMIN — FERROUS SULFATE TAB EC 325 MG (65 MG FE EQUIVALENT) 325 MG: 325 (65 FE) TABLET DELAYED RESPONSE at 09:05

## 2018-05-19 RX ADMIN — LABETALOL HYDROCHLORIDE 20 MG: 5 INJECTION INTRAVENOUS at 11:05

## 2018-05-19 RX ADMIN — CARVEDILOL 25 MG: 25 TABLET, FILM COATED ORAL at 08:05

## 2018-05-19 NOTE — PT/OT/SLP EVAL
Physical Therapy Evaluation    Patient Name:  Jose Cuadra   MRN:  [id number]    Recommendations:     Discharge Recommendations:  nursing facility, skilled   Discharge Equipment Recommendations: none   Barriers to discharge: None    Assessment:     Jose Cuadra is a 82 y.o. male admitted with a medical diagnosis of Non-ischemic cardiomyopathy.  He presents with the following impairments/functional limitations:  weakness, impaired endurance, gait instability, impaired cognition, decreased lower extremity function, impaired cardiopulmonary response to activity, impaired functional mobilty, decreased upper extremity function, impaired self care skills, impaired balance, decreased safety awareness. Patient demonstrated good participation despite impaired cognition, decreased safety awareness, and balance dysfunction. Level of assistance required min A. Able to tolerate gait training in [place] with HHA x 50 feet. Recommending SNF. Skilled physical therapy is medically necessary to address the above impairments in order to return the patient back to their previous level of function.     Rehab Prognosis:  fair; patient would benefit from acute skilled PT services to address these deficits and reach maximum level of function.      Recent Surgery: Procedure(s) (LRB):  ADRENALECTOMY (N/A)      Plan:     During this hospitalization, patient to be seen 4 x/week to address the above listed problems via gait training, therapeutic activities, therapeutic exercises, neuromuscular re-education  · Plan of Care Expires:  06/18/18   Plan of Care Reviewed with: patient, family    Subjective     Communicated with nurse prior to session.  Patient found with HOB elevated and family present upon PT entry to room, agreeable to evaluation.      Chief Complaint: being SOB (patients oxygen saturation level was at 99%)   Patient comments/goals: to go to SNF and receive further therapy services to become stronger  Pain/Comfort:  · Pain Rating 1:  0/10  · Pain Addressed 2: Reposition, Distraction, Cessation of Activity    Patients cultural, spiritual, Anabaptism conflicts given the current situation: none stated    Living Environment:  Patient lives in 1SH with 3 LAVON to enter with family.  Prior to admission, patients level of function was limited community ambulator and needing assistance for ADLs.  Patient has the following equipment: walker, rolling, bedside commode, CPAP.  DME owned (not currently used): none.  Upon discharge, patient will have assistance from daughters.    Objective:     Patient found with: peripheral IV     General Precautions: Standard, fall, hearing impaired, vision impaired   Orthopedic Precautions:N/A   Braces: N/A     Exams:  · Cognitive Exam:  Patient is oriented to Person and Place and follows 60% of single step commands   · Postural Exam:  Patient presented with the following abnormalities:    · -       Rounded shoulders  · -       Forward head  · -       Kyphosis  · Sensation:    · -       Intact  · Skin Integrity/Edema:      · -       Skin integrity: Visible skin intact  · -       Edema: Moderate BLE  · RUE ROM: WFL  · RUE Strength: WFL  · LUE ROM: WFL  · LUE Strength: WFL  · RLE ROM: WFL  · RLE Strength: WFL  · LLE ROM: WFL  · LLE Strength: WFL   · Balance: good in sitting, fair- in standing     Functional Mobility:  · Bed Mobility:   Rolling to the left: Minimal Assistance   Supine to Sit: Minimal Assistance   Scooting at EOB: Stand-by Assistance    · Sitting Balance at Edge of Bed:   Assistance Level Required: Stand-by Assistance   Time: 10 minutes   Postural deviations noted:    -       Rounded shoulders  -       Forward head  -       Kyphosis    · Transfers:   Sit to Stand: Minimal Assistance with No Assistive Device x 2 trials   Stand to Sit: Minimal Assistance with No Assistive Device x 2 trials   Bed to Chair: Stand Pivot with Minimal Assistance with No Assistive Device x 1 trial    · Gait:   Distance Ambulated: Pt  ambulated x50 feet in hallway. Pt demo increased trunk flexion, decreased safety awareness, and balance impairements, and req VC/TCs for balance.   Assistance level: Minimal Assistance   Assistive Device used:  No Assistive Device (patient trialled and did poorly with RW due to cognitive issues, patient best with 2 HHA)   Gait Pattern: 2-point gait   Gait Deviation(s): decreased nael, increased time in double stance and decreased weight-shifting ability   Impairments due to: cognitive impairments, balance dysfunction, decreased safety awareness     AM-PAC 6 CLICK MOBILITY  Total Score:16       Therapeutic Activities and Exercises:  PT arrived to patient's room to find patient resting quietly; agreeable to PT session. Patient performed mobility as above with non-skid socks in place.    · Patient Education:   · PT POC  · Importance of UIC throughout day  · Gait training with PT only due to decreased balance and safety  · delirium precautions  · Edema control  · Heart failure   · PT referral to SNF   · Safety concerns   · EOB activity:  · Functional mobility testing  · Attempted to independently almaz socks (unsuccessful)  · Feet check  Bedside table in front of patient and area set up for function, convenience, and safety. RN aware of patient's mobility needs and status. Questions/concerns addressed within PT scope of practice; patient and family with no further questions.       Patient left up in chair with all lines intact, call button in reach, nurse Carol notified and family present.    GOALS:    Physical Therapy Goals        Problem: Physical Therapy Goal    Goal Priority Disciplines Outcome Goal Variances Interventions   Physical Therapy Goal     PT/OT, PT Ongoing (interventions implemented as appropriate)     Description:  Goals to be met by: 18     Patient will increase functional independence with mobility by performin. Supine to sit with Stand-by Assistance  2. Sit to supine with Stand-by  Assistance  3. Sit to stand transfer with Stand-by Assistance  4. Gait  x >200 feet with Stand-by Assistance using Rolling Walker or LRAD  5. Lower extremity exercise program x20 reps per handout, with supervision                      History:     Past Medical History:   Diagnosis Date    Acute on chronic congestive heart failure     Anemia     receiving blood today 03/24/2017    Blindness, right eye, normal vision left eye     Cancer     kidney cancer 2007    Chronic atrial fibrillation 3/21/2018    Coronary artery disease     pacemaker 2014    Dementia 5/15/2018    Diabetes mellitus, type 2     Disorder of kidney and ureter     right kidney removed    Encounter for blood transfusion     GERD (gastroesophageal reflux disease)     sometimes heartburn    Hyperlipidemia     Hypertension        Past Surgical History:   Procedure Laterality Date    CARDIAC PACEMAKER PLACEMENT      CARDIAC PACEMAKER PLACEMENT Left 12/09/2014    COLON SURGERY      COLONOSCOPY N/A 3/26/2018    Procedure: COLONOSCOPY;  Surgeon: Jonas Belle MD;  Location: [facility];  Service: Endoscopy;  Laterality: N/A;    KIDNEY SURGERY Right     for cancer       Clinical Decision Making:     History  Co-morbidities and personal factors that may impact the plan of care Examination  Body Structures and Functions, activity limitations and participation restrictions that may impact the plan of care Clinical Presentation   Decision Making/ Complexity Score   Co-morbidities:   [] Time since onset of injury / illness / exacerbation  [] Status of current condition  []Patient's cognitive status and safety concerns    [] Multiple Medical Problems (see med hx)  Personal Factors:   [] Patient's age  [] Prior Level of function   [] Patient's home situation (environment and family support)  [] Patient's level of motivation  [] Expected progression of patient      HISTORY:(criteria)    [] 26787 - no personal factors/history    [] 74326 - has  1-2 personal factor/comorbidity     [] 95692 - has >3 personal factor/comorbidity     Body Regions:  [] Objective examination findings  [] Head     []  Neck  [] Trunk   [] Upper Extremity  [] Lower Extremity    Body Systems:  [] For communication ability, affect, cognition, language, and learning style: the assessment of the ability to make needs known, consciousness, orientation (person, place, and time), expected emotional /behavioral responses, and learning preferences (eg, learning barriers, education  needs)  [] For the neuromuscular system: a general assessment of gross coordinated movement (eg, balance, gait, locomotion, transfers, and transitions) and motor function  (motor control and motor learning)  [] For the musculoskeletal system: the assessment of gross symmetry, gross range of motion, gross strength, height, and weight  [] For the integumentary system: the assessment of pliability(texture), presence of scar formation, skin color, and skin integrity  [] For cardiovascular/pulmonary system: the assessment of heart rate, respiratory rate, blood pressure, and edema     Activity limitations:    [] Patient's cognitive status and saf ety concerns          [] Status of current condition      [] Weight bearing restriction  [] Cardiopulmunary Restriction    Participation Restrictions:   [] Goals and goal agreement with the patient     [] Rehab potential (prognosis) and probable outcome      Examination of Body System: (criteria)    [] 17869 - addressing 1-2 elements    [] 50192 - addressing a total of 3 or more elements     [] 85280 -  Addressing a total of 4 or more elements         Clinical Presentation: (criteria)  Choose one     On examination of body system using standardized tests and measures patient presents with (CHOOSE ONE) elements from any of the following: body structures and functions, activity limitations, and/or participation restrictions.  Leading to a clinical presentation that is considered  (CHOOSE ONE)                              Clinical Decision Making  (Eval Complexity):  Choose One     Time Tracking:     PT Received On: 05/19/18  PT Start Time: 1332     PT Stop Time: 1416  PT Total Time (min): 44 min     Billable Minutes: Evaluation 10, Gait Training 20 and Therapeutic Activity 14      Jenna Crump, PT  05/19/2018

## 2018-05-19 NOTE — ASSESSMENT & PLAN NOTE
Patient is 81 yo M who presents with large necrotic left adrenal mass    -renal diet  -pain/nausea meds PRN  -bowel regimen - miralax  -DVT/GI prophylaxis  -PT/OT  -plan for surgery 5/21/18, consents obtained

## 2018-05-19 NOTE — PLAN OF CARE
Problem: Physical Therapy Goal  Goal: Physical Therapy Goal  Goals to be met by: 18     Patient will increase functional independence with mobility by performin. Supine to sit with Stand-by Assistance  2. Sit to supine with Stand-by Assistance  3. Sit to stand transfer with Stand-by Assistance  4. Gait  x >200 feet with Stand-by Assistance using Rolling Walker or LRAD  5. Lower extremity exercise program x20 reps per handout, with supervision    Outcome: Ongoing (interventions implemented as appropriate)  PT evaluation complete. Recommending SNF. Please see full PT evaluation note for details.     Jenna Crump PT, DPT  2018  [phone]

## 2018-05-19 NOTE — PROGRESS NOTES
"[facility]  General Surgery  Progress Note    Subjective:     History of Present Illness:  Patient is an 81 yo M with history of dementia, RCC, possible colon CA, CKD 4, Afib with pacemaker, CHF with EF 36%, and a large abdominal (likely adrenal) mass who presented to OSH 5/2/18 with pneumonia and during his admission was found to have a large adrenal mass that was enlarged from previous CT scan, and he was transferred here for further workup. A CT scan from 2016 showed a similar sized mass. He underwent biopsy which showed no obvious malignancy, mostly necrotic tissue. He has completed his course of abx for pneumonia and his SOA has resolved.    When asked, he states the mass has been present for years. He denies any nausea, vomiting, weight loss, fevers, or chills. He continues to have bowel movements, with bowel incontinence (he says that's his baseline). He has been off anticoagulation despite being in afib. He has been having issues urinating.    He has undergone right nephrectomy and colon resection of 22 cm "maybe for cancer".    Post-Op Info:  Procedure(s) (LRB):  ADRENALECTOMY (N/A)         Interval History:   NAEON. Tolerating diet. Hemodynamically stable.     Medications:  Continuous Infusions:   heparin (porcine) in D5W 18.967 Units/kg/hr (05/18/18 0798)     Scheduled Meds:   albuterol-ipratropium  3 mL Nebulization Q8H    amLODIPine  5 mg Oral Daily    carvedilol  25 mg Oral BID WM    cloNIDine  0.3 mg Oral TID    dutasteride  0.5 mg Oral Daily    ferrous sulfate  325 mg Oral BID    isosorbide-hydrALAZINE 20-37.5 mg  2 tablet Oral TID    memantine  10 mg Oral BID    pantoprazole  40 mg Oral Daily    polyethylene glycol  17 g Oral Daily    potassium, sodium phosphates  2 packet Oral Once    sodium bicarbonate  1 tablet Oral TID     PRN Meds:acetaminophen, dextrose 50%, dextrose 50%, diphenhydrAMINE, glucagon (human recombinant), glucose, glucose, heparin (PORCINE), " heparin (PORCINE), hydrALAZINE, insulin aspart U-100, ondansetron, promethazine (PHENERGAN) IVPB, sodium chloride 0.9%     Review of patient's allergies indicates:  No Known Allergies  Objective:     Vital Signs (Most Recent):  Temp: 96.7 °F (35.9 °C) (05/18/18 0733)  Pulse: 70 (05/18/18 0733)  Resp: 16 (05/18/18 0733)  BP: (!) 203/102 (05/18/18 0733)  SpO2: 97 % (05/18/18 0733) Vital Signs (24h Range):  Temp:  [96.6 °F (35.9 °C)-98.4 °F (36.9 °C)] 96.7 °F (35.9 °C)  Pulse:  [69-79] 70  Resp:  [16-20] 16  SpO2:  [94 %-99 %] 97 %  BP: (153-203)/() 203/102     Weight: 106.5 kg (234 lb 12.8 oz)  Body mass index is 29.35 kg/m².    Intake/Output - Last 3 Shifts       05/16 0700 - 05/17 0659 05/17 0700 - 05/18 0659 05/18 0700 - 05/19 0659    P.O. 1360 1160     I.V. (mL/kg) 464.6 (4.4) 404 (3.8)     Total Intake(mL/kg) 1824.6 (17.1) 1564 (14.7)     Urine (mL/kg/hr) 1075 (0.4) 1475 (0.6)     Total Output 1075 1475      Net +749.6 +89             Stool Occurrence 1 x 3 x           Physical Exam   Constitutional: He is oriented to person, place, and time. He appears well-developed and well-nourished. No distress.   Cardiovascular: Normal rate and regular rhythm.    Pulmonary/Chest: Effort normal. No respiratory distress.   Abdominal: Soft. He exhibits mass. He exhibits no distension. There is no tenderness. There is no rebound and no guarding.   Neurological: He is alert and oriented to person, place, and time.   Skin: Skin is warm and dry.   Psychiatric: He has a normal mood and affect. His behavior is normal.       Significant Labs:  CBC:   Recent Labs  Lab 05/18/18  0329   WBC 6.79   RBC 3.32*   HGB 8.8*   HCT 28.6*      MCV 86   MCH 26.5*   MCHC 30.8*     BMP:   Recent Labs  Lab 05/18/18  0329   *      K 3.7      CO2 25   BUN 19   CREATININE 2.5*   CALCIUM 9.4   MG 1.8           Assessment/Plan:     * Non-ischemic cardiomyopathy    Home meds  -amlodipine, clonidine, carvedilol, bidil         "  Dementia    Home meds  -memantine        Urinary retention    Home meds  -dutasteride  -calabrese        Adrenal tumor    Patient is 83 yo M who presents with large necrotic left adrenal mass    -renal diet  -pain/nausea meds PRN  -bowel regimen - miralax  -DVT/GI prophylaxis  -PT/OT  -plan for surgery 5/21/18, consents obtained        Type 2 diabetes mellitus with diabetic peripheral angiopathy without gangrene, without long-term current use of insulin    SSI, accuchecks        Microcytic anemia    Iron supplement        Chronic atrial fibrillation    Tele  Heparin gtt  "Moderate risk - Cardiology would do nothing more to prep this patient for said surgery."            Sue Ford MD  General Surgery  [facility]  "

## 2018-05-19 NOTE — PLAN OF CARE
Problem: Patient Care Overview  Goal: Plan of Care Review  Outcome: Ongoing (interventions implemented as appropriate)  Plan of care reviewed with the patient, an 82 year ole male with the DX of Adrenal gland tumor,,, tumor scheduled to be taken out on Monday,,, has bouts of minor confusion at times,,, dementia,,, abdomen distended,,,BP runs high,,, IV push hydralazine had to be pushed last night due to elevated blood pressure,,, room air,, AC/HS,,, did not have to be covered last night with insulin,, has pace maker,, V paced,,, on heparin drip,, 20.2 units per hour till next antigen XA is run in the am,,, rested nicely with his Bipap last night,, bed locked and low call light in reach,,,,

## 2018-05-19 NOTE — NURSING
pageakshat. Call returned by resident on call. MD notified of pt elevated blood pressure. MD stated he will order an anti-hypertensive. Will continue to monitor closely.

## 2018-05-19 NOTE — PROGRESS NOTES
Had to call on call surgery intern last night due to high BP and the Hydralazine on the mar used to control it was not due for another 2 hours, permission was given to use q 6 as needed for high blood pressure instead of q8,,,

## 2018-05-19 NOTE — ASSESSMENT & PLAN NOTE
"Tele  Heparin gtt  "Moderate risk - Cardiology would do nothing more to prep this patient for said surgery."  "

## 2018-05-19 NOTE — PLAN OF CARE
Problem: Patient Care Overview  Goal: Plan of Care Review  Pt remains on heparin drip. Infusion rate adjusted.     Problem: Diabetes, Type 2 (Adult)  Goal: Signs and Symptoms of Listed Potential Problems Will be Absent, Minimized or Managed (Diabetes, Type 2)  Signs and symptoms of listed potential problems will be absent, minimized or managed by discharge/transition of care (reference Diabetes, Type 2 (Adult) CPG).   No coverage required during this shift.     Problem: Fall Risk (Adult)  Goal: Identify Related Risk Factors and Signs and Symptoms  Related risk factors and signs and symptoms are identified upon initiation of Human Response Clinical Practice Guideline (CPG)   No falls during this shift.

## 2018-05-20 LAB
ALBUMIN SERPL BCP-MCNC: 2.2 G/DL
ALP SERPL-CCNC: 178 U/L
ALT SERPL W/O P-5'-P-CCNC: 7 U/L
ANION GAP SERPL CALC-SCNC: 9 MMOL/L
AST SERPL-CCNC: 12 U/L
BASOPHILS # BLD AUTO: 0.07 K/UL
BASOPHILS NFR BLD: 1 %
BILIRUB SERPL-MCNC: 0.6 MG/DL
BUN SERPL-MCNC: 23 MG/DL
CALCIUM SERPL-MCNC: 9.7 MG/DL
CHLORIDE SERPL-SCNC: 105 MMOL/L
CO2 SERPL-SCNC: 25 MMOL/L
CREAT SERPL-MCNC: 2.5 MG/DL
DIFFERENTIAL METHOD: ABNORMAL
EOSINOPHIL # BLD AUTO: 0.1 K/UL
EOSINOPHIL NFR BLD: 1.6 %
ERYTHROCYTE [DISTWIDTH] IN BLOOD BY AUTOMATED COUNT: 19.4 %
EST. GFR  (AFRICAN AMERICAN): 27 ML/MIN/1.73 M^2
EST. GFR  (NON AFRICAN AMERICAN): 23.4 ML/MIN/1.73 M^2
FACT X PPP CHRO-ACNC: 0.38 IU/ML
GLUCOSE SERPL-MCNC: 146 MG/DL
HCT VFR BLD AUTO: 28 %
HGB BLD-MCNC: 8.4 G/DL
IMM GRANULOCYTES # BLD AUTO: 0.06 K/UL
IMM GRANULOCYTES NFR BLD AUTO: 0.9 %
LYMPHOCYTES # BLD AUTO: 1.3 K/UL
LYMPHOCYTES NFR BLD: 19.1 %
MAGNESIUM SERPL-MCNC: 1.9 MG/DL
MCH RBC QN AUTO: 25.7 PG
MCHC RBC AUTO-ENTMCNC: 30 G/DL
MCV RBC AUTO: 86 FL
MONOCYTES # BLD AUTO: 0.9 K/UL
MONOCYTES NFR BLD: 12.5 %
NEUTROPHILS # BLD AUTO: 4.4 K/UL
NEUTROPHILS NFR BLD: 64.9 %
NRBC BLD-RTO: 0 /100 WBC
PHOSPHATE SERPL-MCNC: 2.5 MG/DL
PLATELET # BLD AUTO: 265 K/UL
PMV BLD AUTO: 12.4 FL
POCT GLUCOSE: 168 MG/DL (ref 70–110)
POCT GLUCOSE: 174 MG/DL (ref 70–110)
POCT GLUCOSE: 182 MG/DL (ref 70–110)
POCT GLUCOSE: 185 MG/DL (ref 70–110)
POTASSIUM SERPL-SCNC: 3.8 MMOL/L
PROT SERPL-MCNC: 6.9 G/DL
RBC # BLD AUTO: 3.27 M/UL
SODIUM SERPL-SCNC: 139 MMOL/L
WBC # BLD AUTO: 6.82 K/UL

## 2018-05-20 PROCEDURE — 97530 THERAPEUTIC ACTIVITIES: CPT

## 2018-05-20 PROCEDURE — G8979 MOBILITY GOAL STATUS: HCPCS | Mod: CJ

## 2018-05-20 PROCEDURE — 25000003 PHARM REV CODE 250: Performed by: STUDENT IN AN ORGANIZED HEALTH CARE EDUCATION/TRAINING PROGRAM

## 2018-05-20 PROCEDURE — 25000242 PHARM REV CODE 250 ALT 637 W/ HCPCS: Performed by: STUDENT IN AN ORGANIZED HEALTH CARE EDUCATION/TRAINING PROGRAM

## 2018-05-20 PROCEDURE — 94761 N-INVAS EAR/PLS OXIMETRY MLT: CPT

## 2018-05-20 PROCEDURE — 97535 SELF CARE MNGMENT TRAINING: CPT

## 2018-05-20 PROCEDURE — 84100 ASSAY OF PHOSPHORUS: CPT

## 2018-05-20 PROCEDURE — 36415 COLL VENOUS BLD VENIPUNCTURE: CPT

## 2018-05-20 PROCEDURE — 63600175 PHARM REV CODE 636 W HCPCS: Performed by: SURGERY

## 2018-05-20 PROCEDURE — 83735 ASSAY OF MAGNESIUM: CPT

## 2018-05-20 PROCEDURE — 20600001 HC STEP DOWN PRIVATE ROOM

## 2018-05-20 PROCEDURE — 85520 HEPARIN ASSAY: CPT

## 2018-05-20 PROCEDURE — 94640 AIRWAY INHALATION TREATMENT: CPT

## 2018-05-20 PROCEDURE — G8978 MOBILITY CURRENT STATUS: HCPCS | Mod: CK

## 2018-05-20 PROCEDURE — 85025 COMPLETE CBC W/AUTO DIFF WBC: CPT

## 2018-05-20 PROCEDURE — 97116 GAIT TRAINING THERAPY: CPT

## 2018-05-20 PROCEDURE — 97165 OT EVAL LOW COMPLEX 30 MIN: CPT

## 2018-05-20 PROCEDURE — 80053 COMPREHEN METABOLIC PANEL: CPT

## 2018-05-20 RX ORDER — IPRATROPIUM BROMIDE AND ALBUTEROL SULFATE 2.5; .5 MG/3ML; MG/3ML
3 SOLUTION RESPIRATORY (INHALATION)
Status: DISCONTINUED | OUTPATIENT
Start: 2018-05-20 | End: 2018-05-24

## 2018-05-20 RX ADMIN — HYDRALAZINE HYDROCHLORIDE AND ISOSORBIDE DINITRATE 2 TABLET: 37.5; 2 TABLET, FILM COATED ORAL at 09:05

## 2018-05-20 RX ADMIN — AMLODIPINE BESYLATE 10 MG: 10 TABLET ORAL at 08:05

## 2018-05-20 RX ADMIN — SODIUM BICARBONATE 650 MG TABLET 650 MG: at 08:05

## 2018-05-20 RX ADMIN — CLONIDINE HYDROCHLORIDE 0.3 MG: 0.1 TABLET ORAL at 04:05

## 2018-05-20 RX ADMIN — SODIUM BICARBONATE 650 MG TABLET 650 MG: at 04:05

## 2018-05-20 RX ADMIN — CLONIDINE HYDROCHLORIDE 0.3 MG: 0.1 TABLET ORAL at 08:05

## 2018-05-20 RX ADMIN — HEPARIN SODIUM 17 UNITS/KG/HR: 10000 INJECTION, SOLUTION INTRAVENOUS at 10:05

## 2018-05-20 RX ADMIN — IPRATROPIUM BROMIDE AND ALBUTEROL SULFATE 3 ML: .5; 3 SOLUTION RESPIRATORY (INHALATION) at 12:05

## 2018-05-20 RX ADMIN — HYDRALAZINE HYDROCHLORIDE AND ISOSORBIDE DINITRATE 2 TABLET: 37.5; 2 TABLET, FILM COATED ORAL at 04:05

## 2018-05-20 RX ADMIN — IPRATROPIUM BROMIDE AND ALBUTEROL SULFATE 3 ML: .5; 3 SOLUTION RESPIRATORY (INHALATION) at 09:05

## 2018-05-20 RX ADMIN — HEPARIN SODIUM 20.94 UNITS/KG/HR: 10000 INJECTION, SOLUTION INTRAVENOUS at 10:05

## 2018-05-20 RX ADMIN — PANTOPRAZOLE SODIUM 40 MG: 40 TABLET, DELAYED RELEASE ORAL at 08:05

## 2018-05-20 RX ADMIN — MEMANTINE HYDROCHLORIDE 10 MG: 5 TABLET ORAL at 08:05

## 2018-05-20 RX ADMIN — IPRATROPIUM BROMIDE AND ALBUTEROL SULFATE 3 ML: 2.5; .5 SOLUTION RESPIRATORY (INHALATION) at 08:05

## 2018-05-20 RX ADMIN — CARVEDILOL 25 MG: 25 TABLET, FILM COATED ORAL at 08:05

## 2018-05-20 RX ADMIN — DUTASTERIDE 0.5 MG: 0.5 CAPSULE, LIQUID FILLED ORAL at 09:05

## 2018-05-20 RX ADMIN — FERROUS SULFATE TAB EC 325 MG (65 MG FE EQUIVALENT) 325 MG: 325 (65 FE) TABLET DELAYED RESPONSE at 08:05

## 2018-05-20 RX ADMIN — HYDRALAZINE HYDROCHLORIDE AND ISOSORBIDE DINITRATE 2 TABLET: 37.5; 2 TABLET, FILM COATED ORAL at 08:05

## 2018-05-20 RX ADMIN — CARVEDILOL 25 MG: 25 TABLET, FILM COATED ORAL at 04:05

## 2018-05-20 RX ADMIN — POLYETHYLENE GLYCOL 3350 17 G: 17 POWDER, FOR SOLUTION ORAL at 08:05

## 2018-05-20 RX ADMIN — IPRATROPIUM BROMIDE AND ALBUTEROL SULFATE 3 ML: 2.5; .5 SOLUTION RESPIRATORY (INHALATION) at 03:05

## 2018-05-20 NOTE — PLAN OF CARE
Problem: Patient Care Overview  Goal: Plan of Care Review  Outcome: Ongoing (interventions implemented as appropriate)  Plan of care discussed with pt. & family.  All verbalize understanding. Pt. Tolerating renal diet with no complaints of discomfort or nausea. Pain managed with PRN pain medication. Pt. on telemetry. Carney intact.  Pt. Up to chair 3-4 hrs. SBP treated with Hydralazine.  Pt. Positions & ambulates with assist x 1. Vital signs stable. Pt. Remains free of fall and injury. No. Acute events this shift. Will continue to monitor.

## 2018-05-20 NOTE — PT/OT/SLP EVAL
Occupational Therapy   Evaluation    Name: Jose Cuadra  MRN: [id number]  Admitting Diagnosis:  Non-ischemic cardiomyopathy      Recommendations:     Discharge Recommendations: nursing facility, skilled  Discharge Equipment Recommendations:  none  Barriers to discharge:  Decreased caregiver support, Inaccessible home environment    History:     Occupational Profile:  Living Environment: Per pt daughter, pt lives in single story home with family with three steps to entry.   Previous level of function: Pt was a limited community ambulator and had assist with all ADLs.   Roles and Routines: father   Equipment Owned:  walker, rolling, bedside commode, CPAP  Assistance upon Discharge: Pt will limited assist from family upon discharge.     Past Medical History:   Diagnosis Date    Acute on chronic congestive heart failure     Anemia     receiving blood today 03/24/2017    Blindness, right eye, normal vision left eye     Cancer     kidney cancer 2007    Chronic atrial fibrillation 3/21/2018    Coronary artery disease     pacemaker 2014    Dementia 5/15/2018    Diabetes mellitus, type 2     Disorder of kidney and ureter     right kidney removed    Encounter for blood transfusion     GERD (gastroesophageal reflux disease)     sometimes heartburn    Hyperlipidemia     Hypertension        Past Surgical History:   Procedure Laterality Date    CARDIAC PACEMAKER PLACEMENT      CARDIAC PACEMAKER PLACEMENT Left 12/09/2014    COLON SURGERY      COLONOSCOPY N/A 3/26/2018    Procedure: COLONOSCOPY;  Surgeon: Jonas Belle MD;  Location: [facility];  Service: Endoscopy;  Laterality: N/A;    KIDNEY SURGERY Right     for cancer       Subjective     Chief Complaint: SOB at times   Patient/Family stated goals: To go to SNF and receive further therapy services to become stronger   Communicated with: RN prior to session. Pt agreeable to participate in OT evaluation.   Pain/Comfort:  · Pain Rating 1: 0/10 (pt with c/o  SOB )    Patients cultural, spiritual, Islam conflicts given the current situation: none noted or reported     Objective:     Patient found with: peripheral IV    General Precautions: Standard, fall, hearing impaired, vision impaired   Orthopedic Precautions:N/A   Braces: N/A     Occupational Performance:    Bed Mobility:    · Patient completed Rolling/Turning to Left with  stand by assistance  · Patient completed Supine to Sit with stand by assistance with HOB elevated     Functional Mobility/Transfers:  · Patient completed Sit <> Stand Transfer with minimum assistance  with  rolling walker   · Patient completed Bed <> Chair Transfer using Stand Pivot technique with minimum assistance with rolling walker  · Patient completed Toilet Transfer Step Transfer technique with moderate assistance with  rolling walker  · Functional Mobility: Pt performed functional mobility for household distances with min A using RW and min to mod VC for RW management and safety awareness. Pt required min rest breaks between gait trials secondary to reported SOB and fatigue. Pt O2 sats remained above 98% with all activity on room air.     Activities of Daily Living:  · UB Dressing: minimum assistance to don gown like a jacket sitting EOB secondary to line managment   · LB Dressing: minimum assistance to don/doff socks while sitting EOB  · Toileting: total assistance for proper hygiene and clothing managment from regular commode    Cognitive/Visual Perceptual:  Cognitive/Psychosocial Skills:     -       Oriented to: Person   -       Follows Commands/attention:Easily distracted and Follows one-step commands  -       Safety awareness/insight to disability: impaired   Visual/Perceptual:      -Pt reports he wears glasses at times    Physical Exam:  Balance:    -       fair standing balance  Postural examination/scapula alignment:    -       Rounded shoulders  -       Forward head  -       Posterior pelvic tilt  -       Kyphosis  Skin  integrity: Visible skin intact  Edema:  None noted  Sensation:    -       Intact  Dominant hand:    -       Right  Upper Extremity Range of Motion:     -       Right Upper Extremity: WFL  -       Left Upper Extremity: WFL  Upper Extremity Strength:    -       Right Upper Extremity: WFL  -       Left Upper Extremity: WFL   Strength:    -       Right Upper Extremity: WFL  -       Left Upper Extremity: WFL    Patient left up in chair with all lines intact, call button in reach, chair alarm on and RN notified    [facility] 6 Click:  [facility] Total Score: 17    Treatment & Education:  · Pt educated on safety awareness with functional transfers and with completion of ADLS and OT POC   · Pt educated on role of OT and purpose of evaluation and goals for therapy   · Pt completed ADLS and functional mobility for treatment session as noted above  · Pt educated on importance of completing OOB with nursing staff assistance to increase pt functional activity tolerance and for the prevention of secondary complications following surgery  · Pt educated on line management in various set-ups (sitting in recliner, supine in bed, functional mobility tasks)  · White board/Communication board updated       Education:    Assessment:     Jose Cuadra is a 82 y.o. male with a medical diagnosis of Non-ischemic cardiomyopathy.  He presents with performance deficits affecting function including weakness, impaired endurance, gait instability, impaired functional mobilty, impaired self care skills, impaired balance, impaired cognition, decreased safety awareness, impaired cardiopulmonary response to activity, decreased lower extremity function. Pt presents with increased confusion, decreased executive functioning skills, and generalized weakness remaining a limiting factor for completion of ADLs and mobility. Pt will benefit from skilled OT services to address the above listed impairments, decrease caregiver burden, and increase pt functional  "independence level. Recommend SNF placement upon discharge.     Rehab Prognosis:  Fair ; patient would benefit from acute skilled OT services to address these deficits and reach maximum level of function.         Clinical Decision Makin.  OT Low:  "Pt evaluation falls under low complexity for evaluation coding due to performance deficits noted in 1-3 areas as stated above and 0 co-morbities affecting current functional status. Data obtained from problem focused assessments. No modifications or assistance was required for completion of evaluation. Only brief occupational profile and history review completed."     Plan:     Patient to be seen 4 x/week to address the above listed problems via self-care/home management, therapeutic activities, therapeutic exercises, neuromuscular re-education  · Plan of Care Expires: 18  · Plan of Care Reviewed with: patient    This Plan of care has been discussed with the patient who was involved in its development and understands and is in agreement with the identified goals and treatment plan    GOALS:    Occupational Therapy Goals        Problem: Occupational Therapy Goal    Goal Priority Disciplines Outcome Interventions   Occupational Therapy Goal     OT, PT/OT Ongoing (interventions implemented as appropriate)    Description:  Goals to be met by: 06/10/2018     Patient will increase functional independence with ADLs by performing:    UE Dressing with Supervision.  LE Dressing with Stand-by Assistance.  Grooming while standing with Stand-by Assistance.  Toileting from bedside commode with Stand-by Assistance for hygiene and clothing management.   Toilet transfer to bedside commode with Stand-by Assistance.  Upper extremity exercise program x20 reps per handout, with supervision and assistance as needed.                      Time Tracking:     OT Date of Treatment: 18  OT Start Time: 1009  OT Stop Time: 1042  OT Total Time (min): 33 min    Billable " Minutes:Evaluation 20  Self Care/Home Management 13    Tereso Cardoso, OT  5/20/2018

## 2018-05-20 NOTE — ASSESSMENT & PLAN NOTE
Patient is 83 yo M who presents with large necrotic left adrenal mass    -renal diet, NPO at midnight  -pain/nausea meds PRN  -bowel regimen - miralax  -hold heparin at 0500 for surgery tomorrow, second case  -PT/OT  -plan for surgery 5/21/18, consents obtained

## 2018-05-20 NOTE — SUBJECTIVE & OBJECTIVE
Interval History:   NAEON. Tolerating diet. Hemodynamically stable. Heparin infusing.  Patient anxious about surgery this morning.    Medications:  Continuous Infusions:   heparin (porcine) in D5W 20.939 Units/kg/hr (05/19/18 2133)     Scheduled Meds:   albuterol-ipratropium  3 mL Nebulization Q8H    amLODIPine  10 mg Oral Daily    carvedilol  25 mg Oral BID WM    cloNIDine  0.3 mg Oral TID    dutasteride  0.5 mg Oral Daily    ferrous sulfate  325 mg Oral BID    isosorbide-hydrALAZINE 20-37.5 mg  2 tablet Oral TID    memantine  10 mg Oral BID    pantoprazole  40 mg Oral Daily    polyethylene glycol  17 g Oral Daily    sodium bicarbonate  1 tablet Oral TID     PRN Meds:acetaminophen, dextrose 50%, dextrose 50%, diphenhydrAMINE, glucagon (human recombinant), glucose, glucose, heparin (PORCINE), heparin (PORCINE), hydrALAZINE, hydrALAZINE, insulin aspart U-100, labetalol, ondansetron, promethazine (PHENERGAN) IVPB, sodium chloride 0.9%     Review of patient's allergies indicates:  No Known Allergies  Objective:     Vital Signs (Most Recent):  Temp: 98 °F (36.7 °C) (05/20/18 0459)  Pulse: 71 (05/20/18 0459)  Resp: 18 (05/20/18 0459)  BP: (!) 166/84 (05/20/18 0459)  SpO2: 96 % (05/20/18 0459) Vital Signs (24h Range):  Temp:  [97.5 °F (36.4 °C)-98.4 °F (36.9 °C)] 98 °F (36.7 °C)  Pulse:  [69-73] 71  Resp:  [15-20] 18  SpO2:  [92 %-98 %] 96 %  BP: (147-206)/() 166/84     Weight: 106.5 kg (234 lb 13 oz)  Body mass index is 29.35 kg/m².    Intake/Output - Last 3 Shifts       05/18 0700 - 05/19 0659 05/19 0700 - 05/20 0659 05/20 0700 - 05/21 0659    P.O. 1210 1240     I.V. (mL/kg) 601 (5.6) 379.8 (3.6)     IV Piggyback  16     Total Intake(mL/kg) 1811 (17) 1635.8 (15.4)     Urine (mL/kg/hr) 1200 (0.5) 1250 (0.5)     Emesis/NG output 0 (0) 0 (0)     Stool 0 (0) 0 (0)     Total Output 1200 1250      Net +611 +385.8             Urine Occurrence 0 x 0 x     Stool Occurrence 0 x 0 x     Emesis Occurrence 0 x 0 x            Physical Exam   Constitutional: He is oriented to person, place, and time. He appears well-developed and well-nourished. No distress.   Cardiovascular: Normal rate and regular rhythm.    Pulmonary/Chest: Effort normal. No respiratory distress.   Abdominal: Soft. He exhibits mass. He exhibits no distension. There is no tenderness. There is no rebound and no guarding.   Neurological: He is alert and oriented to person, place, and time.   Skin: Skin is warm and dry.   Psychiatric: He has a normal mood and affect. His behavior is normal.       Significant Labs:  CBC:     Recent Labs  Lab 05/20/18  0458   WBC 6.82   RBC 3.27*   HGB 8.4*   HCT 28.0*      MCV 86   MCH 25.7*   MCHC 30.0*     BMP:     Recent Labs  Lab 05/20/18 0458   *      K 3.8      CO2 25   BUN 23   CREATININE 2.5*   CALCIUM 9.7   MG 1.9

## 2018-05-20 NOTE — PLAN OF CARE
Problem: Physical Therapy Goal  Goal: Physical Therapy Goal  Goals to be met by: 18     Patient will increase functional independence with mobility by performin. Supine to sit with Stand-by Assistance  2. Sit to supine with Stand-by Assistance  3. Sit to stand transfer with Stand-by Assistance  4. Gait  x >200 feet with Stand-by Assistance using Rolling Walker or LRAD  5. Lower extremity exercise program x20 reps per handout, with supervision     Outcome: Ongoing (interventions implemented as appropriate)  Goals updated and appropriate to address patient's current needs.     Jenna Crump PT, DPT  2018  [phone]

## 2018-05-20 NOTE — PLAN OF CARE
Problem: Occupational Therapy Goal  Goal: Occupational Therapy Goal  Goals to be met by: 06/10/2018     Patient will increase functional independence with ADLs by performing:    UE Dressing with Supervision.  LE Dressing with Stand-by Assistance.  Grooming while standing with Stand-by Assistance.  Toileting from bedside commode with Stand-by Assistance for hygiene and clothing management.   Toilet transfer to bedside commode with Stand-by Assistance.  Upper extremity exercise program x20 reps per handout, with supervision and assistance as needed.    Outcome: Ongoing (interventions implemented as appropriate)  Initial eval completed. POC implemented and goals established. Pt progressing towards all goals at this time. All goals remain appropriate. Revise goals as needed.    Tereso Cardoso, OT  5/20/2018

## 2018-05-20 NOTE — PT/OT/SLP PROGRESS
Physical Therapy Treatment    Patient Name:  Jose Cuadra   MRN:  [id number]    Recommendations:     Discharge Recommendations:  nursing facility, skilled   Discharge Equipment Recommendations: none   Barriers to discharge: Inaccessible home and Decreased caregiver support    Assessment:     Jose Cuadra is a 82 y.o. male admitted with a medical diagnosis of Non-ischemic cardiomyopathy.  He presents with the following impairments/functional limitations:  weakness, impaired endurance, gait instability, impaired cognition, decreased lower extremity function, impaired cardiopulmonary response to activity, edema, decreased upper extremity function, visual deficits, impaired functional mobilty, impaired self care skills, impaired balance, decreased safety awareness. Patient demonstrated good participation despite impaired cognition. Level of assistance required mod - min A. Able to tolerate gait training and functional mobility in the bathroom. Recommending SNF. Skilled physical therapy is medically necessary to address the above impairments in order to return the patient back to their previous level of function.       Rehab Prognosis:  Fair ; patient would benefit from acute skilled PT services to address these deficits and reach maximum level of function.      Recent Surgery: Procedure(s) (LRB):  ADRENALECTOMY (N/A)      Plan:     During this hospitalization, patient to be seen 4 x/week to address the above listed problems via gait training, therapeutic activities, therapeutic exercises, neuromuscular re-education  · Plan of Care Expires:  06/18/18   Plan of Care Reviewed with: patient    Subjective     Communicated with nurse Cloud prior to session.  Patient found with HOB elevated in NAD upon PT entry to room, agreeable to treatment.      Chief Complaint: SOB  Patient comments/goals: n/a  Pain/Comfort:  · Pain Rating 1: 0/10    Patients cultural, spiritual, Baptism conflicts given the current situation: none  stated    Objective:     Patient found with: peripheral IV, telemetry     General Precautions: Standard, fall, hearing impaired, vision impaired   Orthopedic Precautions:N/A   Braces: N/A     Functional Mobility:  · Bed Mobility:   Rolling to the left Stand-by Assistance   Supine to Sit: Stand-by Assistance   Scooting at EOB: Stand-by Assistance    · Sitting Balance at Edge of Bed:   Assistance Level Required: Stand-by Assistance   Time: 10 minutes   Postural deviations noted:    -       Rounded shoulders  -       Forward head  -       Posterior pelvic tilt  -       Abnormal trunk flexion  -       Kyphosis   Encouraged: sitting upright with head upright and feet flat on the floor    · Transfers:   Sit to Stand: Minimal Assistance with Rolling Walker x 4 trials   Stand to Sit: Minimal Assistance with Rolling Walker x 4 trials   Bed to Chair: Stand Pivot with Minimal Assistance with Rolling Walker x 1 trial    · Gait:   Distance Ambulated: Pt ambulated x75, 30, 25 feet in hallway/room with rest breaks and chair to follow. Pt demo increased trunk flexion, decreased cognition, impaired ability to hold RW at appropriate length and req VC/TCs for step sequencing, posture, and RW use.   Assistance level: Minimal Assistance   Assistive Device used:  Rolling Walker   Gait Pattern: 4-point gait   Gait Deviation(s): decreased nael, increased time in double stance, decreased toe-to-floor clearance and decreased weight-shifting ability      AM-PAC 6 CLICK MOBILITY  Turning over in bed (including adjusting bedclothes, sheets and blankets)?: 3  Sitting down on and standing up from a chair with arms (e.g., wheelchair, bedside commode, etc.): 3  Moving from lying on back to sitting on the side of the bed?: 3  Moving to and from a bed to a chair (including a wheelchair)?: 3  Need to walk in hospital room?: 3  Climbing 3-5 steps with a railing?: 2  Total Score: 17       Therapeutic Activities and Exercises:  PT arrived to patient's  room to find patient resting quietly; agreeable to PT session. Patient performed mobility as above with non-skid socks in place.  Patient had BM in toilet x 2 with total A of OT for pericare and min A of PT for balance during standing.   · Patient Education:   · PT POC   · Importance of functional mobility and participating in gait training and PT  · Lights on following delirium precautions   · Gait training   · Transfer training  · Safety in room   Bedside table in front of patient and area set up for function, convenience, and safety. RN aware of patient's mobility needs and status. Questions/concerns addressed within PT scope of practice; patient with no further questions.     Patient left up in chair with all lines intact, call button in reach, chair alarm on and nurse Ai notified..    GOALS:    Physical Therapy Goals        Problem: Physical Therapy Goal    Goal Priority Disciplines Outcome Goal Variances Interventions   Physical Therapy Goal     PT/OT, PT Ongoing (interventions implemented as appropriate)     Description:  Goals to be met by: 18     Patient will increase functional independence with mobility by performin. Supine to sit with Stand-by Assistance  2. Sit to supine with Stand-by Assistance  3. Sit to stand transfer with Stand-by Assistance  4. Gait  x >200 feet with Stand-by Assistance using Rolling Walker or LRAD  5. Lower extremity exercise program x20 reps per handout, with supervision                      Time Tracking:     PT Received On: 18  PT Start Time: 1009     PT Stop Time: 1042  PT Total Time (min): 33 min     Billable Minutes: Gait Training 18 and Therapeutic Activity 15    Treatment Type: Treatment  PT/PTA: PT           Jenna Crump, PT  2018

## 2018-05-20 NOTE — PROGRESS NOTES
Pt c/o feeling like something is in his Left nare, I didn't not visualize anything, however daughter thought it was a sore from him picking his nose.

## 2018-05-20 NOTE — PROGRESS NOTES
"[facility]  General Surgery  Progress Note    Subjective:     History of Present Illness:  Patient is an 83 yo M with history of dementia, RCC, possible colon CA, CKD 4, Afib with pacemaker, CHF with EF 36%, and a large abdominal (likely adrenal) mass who presented to OSH 5/2/18 with pneumonia and during his admission was found to have a large adrenal mass that was enlarged from previous CT scan, and he was transferred here for further workup. A CT scan from 2016 showed a similar sized mass. He underwent biopsy which showed no obvious malignancy, mostly necrotic tissue. He has completed his course of abx for pneumonia and his SOA has resolved.    When asked, he states the mass has been present for years. He denies any nausea, vomiting, weight loss, fevers, or chills. He continues to have bowel movements, with bowel incontinence (he says that's his baseline). He has been off anticoagulation despite being in afib. He has been having issues urinating.    He has undergone right nephrectomy and colon resection of 22 cm "maybe for cancer".    Post-Op Info:  Procedure(s) (LRB):  ADRENALECTOMY (N/A)         Interval History:   NAEON. Tolerating diet. Hemodynamically stable. Heparin infusing.  Patient anxious about surgery this morning.    Medications:  Continuous Infusions:   heparin (porcine) in D5W 20.939 Units/kg/hr (05/19/18 2133)     Scheduled Meds:   albuterol-ipratropium  3 mL Nebulization Q8H    amLODIPine  10 mg Oral Daily    carvedilol  25 mg Oral BID WM    cloNIDine  0.3 mg Oral TID    dutasteride  0.5 mg Oral Daily    ferrous sulfate  325 mg Oral BID    isosorbide-hydrALAZINE 20-37.5 mg  2 tablet Oral TID    memantine  10 mg Oral BID    pantoprazole  40 mg Oral Daily    polyethylene glycol  17 g Oral Daily    sodium bicarbonate  1 tablet Oral TID     PRN Meds:acetaminophen, dextrose 50%, dextrose 50%, diphenhydrAMINE, glucagon (human recombinant), glucose, glucose, heparin " (PORCINE), heparin (PORCINE), hydrALAZINE, hydrALAZINE, insulin aspart U-100, labetalol, ondansetron, promethazine (PHENERGAN) IVPB, sodium chloride 0.9%     Review of patient's allergies indicates:  No Known Allergies  Objective:     Vital Signs (Most Recent):  Temp: 98 °F (36.7 °C) (05/20/18 0459)  Pulse: 71 (05/20/18 0459)  Resp: 18 (05/20/18 0459)  BP: (!) 166/84 (05/20/18 0459)  SpO2: 96 % (05/20/18 0459) Vital Signs (24h Range):  Temp:  [97.5 °F (36.4 °C)-98.4 °F (36.9 °C)] 98 °F (36.7 °C)  Pulse:  [69-73] 71  Resp:  [15-20] 18  SpO2:  [92 %-98 %] 96 %  BP: (147-206)/() 166/84     Weight: 106.5 kg (234 lb 13 oz)  Body mass index is 29.35 kg/m².    Intake/Output - Last 3 Shifts       05/18 0700 - 05/19 0659 05/19 0700 - 05/20 0659 05/20 0700 - 05/21 0659    P.O. 1210 1240     I.V. (mL/kg) 601 (5.6) 379.8 (3.6)     IV Piggyback  16     Total Intake(mL/kg) 1811 (17) 1635.8 (15.4)     Urine (mL/kg/hr) 1200 (0.5) 1250 (0.5)     Emesis/NG output 0 (0) 0 (0)     Stool 0 (0) 0 (0)     Total Output 1200 1250      Net +611 +385.8             Urine Occurrence 0 x 0 x     Stool Occurrence 0 x 0 x     Emesis Occurrence 0 x 0 x           Physical Exam   Constitutional: He is oriented to person, place, and time. He appears well-developed and well-nourished. No distress.   Cardiovascular: Normal rate and regular rhythm.    Pulmonary/Chest: Effort normal. No respiratory distress.   Abdominal: Soft. He exhibits mass. He exhibits no distension. There is no tenderness. There is no rebound and no guarding.   Neurological: He is alert and oriented to person, place, and time.   Skin: Skin is warm and dry.   Psychiatric: He has a normal mood and affect. His behavior is normal.       Significant Labs:  CBC:     Recent Labs  Lab 05/20/18  0458   WBC 6.82   RBC 3.27*   HGB 8.4*   HCT 28.0*      MCV 86   MCH 25.7*   MCHC 30.0*     BMP:     Recent Labs  Lab 05/20/18  0458   *      K 3.8      CO2 25   BUN 23  "  CREATININE 2.5*   CALCIUM 9.7   MG 1.9           Assessment/Plan:     * Non-ischemic cardiomyopathy    Home meds  -amlodipine, clonidine, carvedilol, bidil          Adrenal tumor    Patient is 83 yo M who presents with large necrotic left adrenal mass    -renal diet, NPO at midnight  -pain/nausea meds PRN  -bowel regimen - miralax  -hold heparin at 0500 for surgery tomorrow, second case  -PT/OT  -plan for surgery 5/21/18, consents obtained        Dementia    Home meds  -memantine        Urinary retention    Home meds  -dutasteride  -calabrese        Type 2 diabetes mellitus with diabetic peripheral angiopathy without gangrene, without long-term current use of insulin    SSI, accuchecks        Microcytic anemia    Iron supplement        Chronic atrial fibrillation    Tele  Heparin gtt  "Moderate risk - Cardiology would do nothing more to prep this patient for said surgery."            Jonathan Schoen, MD  General Surgery  [facility]  "

## 2018-05-20 NOTE — PLAN OF CARE
Problem: Patient Care Overview  Goal: Plan of Care Review  Outcome: Ongoing (interventions implemented as appropriate)  Plan of care reviewed with the patient, an 82 year old male with the DX of Adrenal Gland tumor,, non ischemic cardiomyopathy, admitted on the 14th of this month,,, has bouts of dementia, especially at night,,, belly distended,, lower leg edema,,, still has a calabrese due to

## 2018-05-21 ENCOUNTER — SURGERY (OUTPATIENT)
Age: 83
End: 2018-05-21

## 2018-05-21 ENCOUNTER — ANESTHESIA (OUTPATIENT)
Dept: SURGERY | Facility: HOSPITAL | Age: 83
DRG: 614 | End: 2018-05-21
Payer: MEDICARE

## 2018-05-21 LAB
ABO + RH BLD: NORMAL
ALBUMIN SERPL BCP-MCNC: 1.9 G/DL
ALBUMIN SERPL BCP-MCNC: 2.2 G/DL
ALP SERPL-CCNC: 155 U/L
ALP SERPL-CCNC: 180 U/L
ALT SERPL W/O P-5'-P-CCNC: 8 U/L
ALT SERPL W/O P-5'-P-CCNC: 9 U/L
ANION GAP SERPL CALC-SCNC: 7 MMOL/L
ANION GAP SERPL CALC-SCNC: 7 MMOL/L
APTT BLDCRRT: 23.3 SEC
AST SERPL-CCNC: 13 U/L
AST SERPL-CCNC: 13 U/L
BASOPHILS # BLD AUTO: 0.04 K/UL
BASOPHILS # BLD AUTO: 0.06 K/UL
BASOPHILS NFR BLD: 0.5 %
BASOPHILS NFR BLD: 0.9 %
BILIRUB SERPL-MCNC: 0.6 MG/DL
BILIRUB SERPL-MCNC: 0.6 MG/DL
BLD GP AB SCN CELLS X3 SERPL QL: NORMAL
BUN SERPL-MCNC: 22 MG/DL
BUN SERPL-MCNC: 23 MG/DL
CALCIUM SERPL-MCNC: 8.4 MG/DL
CALCIUM SERPL-MCNC: 9.6 MG/DL
CHLORIDE SERPL-SCNC: 104 MMOL/L
CHLORIDE SERPL-SCNC: 107 MMOL/L
CO2 SERPL-SCNC: 23 MMOL/L
CO2 SERPL-SCNC: 25 MMOL/L
CREAT SERPL-MCNC: 2.4 MG/DL
CREAT SERPL-MCNC: 2.5 MG/DL
DIFFERENTIAL METHOD: ABNORMAL
DIFFERENTIAL METHOD: ABNORMAL
EOSINOPHIL # BLD AUTO: 0.1 K/UL
EOSINOPHIL # BLD AUTO: 0.2 K/UL
EOSINOPHIL NFR BLD: 1.3 %
EOSINOPHIL NFR BLD: 2.4 %
ERYTHROCYTE [DISTWIDTH] IN BLOOD BY AUTOMATED COUNT: 19 %
ERYTHROCYTE [DISTWIDTH] IN BLOOD BY AUTOMATED COUNT: 19.2 %
EST. GFR  (AFRICAN AMERICAN): 27 ML/MIN/1.73 M^2
EST. GFR  (AFRICAN AMERICAN): 28.4 ML/MIN/1.73 M^2
EST. GFR  (NON AFRICAN AMERICAN): 23.4 ML/MIN/1.73 M^2
EST. GFR  (NON AFRICAN AMERICAN): 24.6 ML/MIN/1.73 M^2
GLUCOSE SERPL-MCNC: 154 MG/DL
GLUCOSE SERPL-MCNC: 164 MG/DL
HCT VFR BLD AUTO: 24.7 %
HCT VFR BLD AUTO: 27.2 %
HGB BLD-MCNC: 7.7 G/DL
HGB BLD-MCNC: 8.4 G/DL
IMM GRANULOCYTES # BLD AUTO: 0.04 K/UL
IMM GRANULOCYTES # BLD AUTO: 0.08 K/UL
IMM GRANULOCYTES NFR BLD AUTO: 0.6 %
IMM GRANULOCYTES NFR BLD AUTO: 0.9 %
INR PPP: 1.1
LYMPHOCYTES # BLD AUTO: 0.9 K/UL
LYMPHOCYTES # BLD AUTO: 1.2 K/UL
LYMPHOCYTES NFR BLD: 10.6 %
LYMPHOCYTES NFR BLD: 17.7 %
MAGNESIUM SERPL-MCNC: 1.8 MG/DL
MAGNESIUM SERPL-MCNC: 1.9 MG/DL
MCH RBC QN AUTO: 26.6 PG
MCH RBC QN AUTO: 26.9 PG
MCHC RBC AUTO-ENTMCNC: 30.9 G/DL
MCHC RBC AUTO-ENTMCNC: 31.2 G/DL
MCV RBC AUTO: 86 FL
MCV RBC AUTO: 86 FL
MONOCYTES # BLD AUTO: 0.6 K/UL
MONOCYTES # BLD AUTO: 0.8 K/UL
MONOCYTES NFR BLD: 12.2 %
MONOCYTES NFR BLD: 6.4 %
NEUTROPHILS # BLD AUTO: 4.3 K/UL
NEUTROPHILS # BLD AUTO: 7 K/UL
NEUTROPHILS NFR BLD: 66.2 %
NEUTROPHILS NFR BLD: 80.3 %
NRBC BLD-RTO: 0 /100 WBC
NRBC BLD-RTO: 0 /100 WBC
PHOSPHATE SERPL-MCNC: 2.8 MG/DL
PHOSPHATE SERPL-MCNC: 2.9 MG/DL
PLATELET # BLD AUTO: 232 K/UL
PLATELET # BLD AUTO: 258 K/UL
PMV BLD AUTO: 11.4 FL
PMV BLD AUTO: 12.1 FL
POCT GLUCOSE: 147 MG/DL (ref 70–110)
POCT GLUCOSE: 167 MG/DL (ref 70–110)
POTASSIUM SERPL-SCNC: 3.5 MMOL/L
POTASSIUM SERPL-SCNC: 3.7 MMOL/L
PROT SERPL-MCNC: 5.9 G/DL
PROT SERPL-MCNC: 6.8 G/DL
PROTHROMBIN TIME: 11.5 SEC
RBC # BLD AUTO: 2.86 M/UL
RBC # BLD AUTO: 3.16 M/UL
SODIUM SERPL-SCNC: 136 MMOL/L
SODIUM SERPL-SCNC: 137 MMOL/L
WBC # BLD AUTO: 6.54 K/UL
WBC # BLD AUTO: 8.76 K/UL

## 2018-05-21 PROCEDURE — 0WBH0ZZ EXCISION OF RETROPERITONEUM, OPEN APPROACH: ICD-10-PCS | Performed by: SURGERY

## 2018-05-21 PROCEDURE — 25000003 PHARM REV CODE 250: Performed by: STUDENT IN AN ORGANIZED HEALTH CARE EDUCATION/TRAINING PROGRAM

## 2018-05-21 PROCEDURE — 85025 COMPLETE CBC W/AUTO DIFF WBC: CPT | Mod: 91

## 2018-05-21 PROCEDURE — 0GT20ZZ RESECTION OF LEFT ADRENAL GLAND, OPEN APPROACH: ICD-10-PCS | Performed by: SURGERY

## 2018-05-21 PROCEDURE — 20600001 HC STEP DOWN PRIVATE ROOM

## 2018-05-21 PROCEDURE — 63600175 PHARM REV CODE 636 W HCPCS: Performed by: STUDENT IN AN ORGANIZED HEALTH CARE EDUCATION/TRAINING PROGRAM

## 2018-05-21 PROCEDURE — 82962 GLUCOSE BLOOD TEST: CPT | Performed by: SURGERY

## 2018-05-21 PROCEDURE — 71000033 HC RECOVERY, INTIAL HOUR: Performed by: SURGERY

## 2018-05-21 PROCEDURE — 85730 THROMBOPLASTIN TIME PARTIAL: CPT

## 2018-05-21 PROCEDURE — 63600175 PHARM REV CODE 636 W HCPCS: Performed by: SURGERY

## 2018-05-21 PROCEDURE — 36620 INSERTION CATHETER ARTERY: CPT | Mod: 59,,, | Performed by: ANESTHESIOLOGY

## 2018-05-21 PROCEDURE — 36000708 HC OR TIME LEV III 1ST 15 MIN: Performed by: SURGERY

## 2018-05-21 PROCEDURE — 85610 PROTHROMBIN TIME: CPT

## 2018-05-21 PROCEDURE — 94640 AIRWAY INHALATION TREATMENT: CPT

## 2018-05-21 PROCEDURE — 36620 INSERTION CATHETER ARTERY: CPT | Performed by: STUDENT IN AN ORGANIZED HEALTH CARE EDUCATION/TRAINING PROGRAM

## 2018-05-21 PROCEDURE — 37000009 HC ANESTHESIA EA ADD 15 MINS: Performed by: SURGERY

## 2018-05-21 PROCEDURE — 36000709 HC OR TIME LEV III EA ADD 15 MIN: Performed by: SURGERY

## 2018-05-21 PROCEDURE — 94761 N-INVAS EAR/PLS OXIMETRY MLT: CPT

## 2018-05-21 PROCEDURE — 99900035 HC TECH TIME PER 15 MIN (STAT)

## 2018-05-21 PROCEDURE — 84100 ASSAY OF PHOSPHORUS: CPT

## 2018-05-21 PROCEDURE — 43830 GSTRST OPEN WO CONSTJ TUBE: CPT | Mod: 51,,, | Performed by: SURGERY

## 2018-05-21 PROCEDURE — 86901 BLOOD TYPING SEROLOGIC RH(D): CPT

## 2018-05-21 PROCEDURE — 25000003 PHARM REV CODE 250: Performed by: SURGERY

## 2018-05-21 PROCEDURE — 0DH60UZ INSERTION OF FEEDING DEVICE INTO STOMACH, OPEN APPROACH: ICD-10-PCS | Performed by: SURGERY

## 2018-05-21 PROCEDURE — 88307 TISSUE EXAM BY PATHOLOGIST: CPT | Mod: 26,,, | Performed by: PATHOLOGY

## 2018-05-21 PROCEDURE — 83735 ASSAY OF MAGNESIUM: CPT

## 2018-05-21 PROCEDURE — 88307 TISSUE EXAM BY PATHOLOGIST: CPT | Performed by: PATHOLOGY

## 2018-05-21 PROCEDURE — 27000221 HC OXYGEN, UP TO 24 HOURS

## 2018-05-21 PROCEDURE — 60545 EXPLORE ADRENAL GLAND: CPT | Mod: ,,, | Performed by: SURGERY

## 2018-05-21 PROCEDURE — 71000039 HC RECOVERY, EACH ADD'L HOUR: Performed by: SURGERY

## 2018-05-21 PROCEDURE — 25000242 PHARM REV CODE 250 ALT 637 W/ HCPCS: Performed by: STUDENT IN AN ORGANIZED HEALTH CARE EDUCATION/TRAINING PROGRAM

## 2018-05-21 PROCEDURE — 25000003 PHARM REV CODE 250

## 2018-05-21 PROCEDURE — 86920 COMPATIBILITY TEST SPIN: CPT

## 2018-05-21 PROCEDURE — D9220A PRA ANESTHESIA: Mod: ,,, | Performed by: ANESTHESIOLOGY

## 2018-05-21 PROCEDURE — 27201423 OPTIME MED/SURG SUP & DEVICES STERILE SUPPLY: Performed by: SURGERY

## 2018-05-21 PROCEDURE — 37000008 HC ANESTHESIA 1ST 15 MINUTES: Performed by: SURGERY

## 2018-05-21 PROCEDURE — 80053 COMPREHEN METABOLIC PANEL: CPT | Mod: 91

## 2018-05-21 RX ORDER — SUCCINYLCHOLINE CHLORIDE 20 MG/ML
INJECTION INTRAMUSCULAR; INTRAVENOUS
Status: DISCONTINUED | OUTPATIENT
Start: 2018-05-21 | End: 2018-05-21

## 2018-05-21 RX ORDER — PHENYLEPHRINE HYDROCHLORIDE 10 MG/ML
INJECTION INTRAVENOUS
Status: DISCONTINUED | OUTPATIENT
Start: 2018-05-21 | End: 2018-05-21

## 2018-05-21 RX ORDER — NEOSTIGMINE METHYLSULFATE 1 MG/ML
INJECTION, SOLUTION INTRAVENOUS
Status: DISCONTINUED | OUTPATIENT
Start: 2018-05-21 | End: 2018-05-21

## 2018-05-21 RX ORDER — SODIUM CHLORIDE 9 MG/ML
INJECTION, SOLUTION INTRAVENOUS CONTINUOUS
Status: DISCONTINUED | OUTPATIENT
Start: 2018-05-21 | End: 2018-05-28

## 2018-05-21 RX ORDER — NALOXONE HCL 0.4 MG/ML
0.02 VIAL (ML) INJECTION
Status: DISCONTINUED | OUTPATIENT
Start: 2018-05-21 | End: 2018-05-28 | Stop reason: HOSPADM

## 2018-05-21 RX ORDER — MORPHINE SULFATE 1 MG/ML
INJECTION INTRAVENOUS CONTINUOUS
Status: DISCONTINUED | OUTPATIENT
Start: 2018-05-21 | End: 2018-05-21

## 2018-05-21 RX ORDER — GLYCOPYRROLATE 0.2 MG/ML
INJECTION INTRAMUSCULAR; INTRAVENOUS
Status: DISCONTINUED | OUTPATIENT
Start: 2018-05-21 | End: 2018-05-21

## 2018-05-21 RX ORDER — FENTANYL CITRATE 50 UG/ML
25 INJECTION, SOLUTION INTRAMUSCULAR; INTRAVENOUS EVERY 5 MIN PRN
Status: DISCONTINUED | OUTPATIENT
Start: 2018-05-21 | End: 2018-05-21 | Stop reason: HOSPADM

## 2018-05-21 RX ORDER — SODIUM CHLORIDE 9 MG/ML
INJECTION, SOLUTION INTRAVENOUS CONTINUOUS
Status: DISCONTINUED | OUTPATIENT
Start: 2018-05-21 | End: 2018-05-21

## 2018-05-21 RX ORDER — MORPHINE SULFATE 1 MG/ML
INJECTION INTRAVENOUS
Status: COMPLETED
Start: 2018-05-21 | End: 2018-05-21

## 2018-05-21 RX ORDER — KETAMINE HCL IN 0.9 % NACL 50 MG/5 ML
SYRINGE (ML) INTRAVENOUS
Status: DISCONTINUED | OUTPATIENT
Start: 2018-05-21 | End: 2018-05-21

## 2018-05-21 RX ORDER — SODIUM CHLORIDE 0.9 % (FLUSH) 0.9 %
3 SYRINGE (ML) INJECTION
Status: DISCONTINUED | OUTPATIENT
Start: 2018-05-21 | End: 2018-05-21 | Stop reason: HOSPADM

## 2018-05-21 RX ORDER — SODIUM CHLORIDE 9 MG/ML
INJECTION, SOLUTION INTRAVENOUS CONTINUOUS PRN
Status: DISCONTINUED | OUTPATIENT
Start: 2018-05-21 | End: 2018-05-21

## 2018-05-21 RX ORDER — HYDROMORPHONE HYDROCHLORIDE 1 MG/ML
0.5 INJECTION, SOLUTION INTRAMUSCULAR; INTRAVENOUS; SUBCUTANEOUS
Status: DISCONTINUED | OUTPATIENT
Start: 2018-05-21 | End: 2018-05-28 | Stop reason: HOSPADM

## 2018-05-21 RX ORDER — HYDROMORPHONE HYDROCHLORIDE 1 MG/ML
1 INJECTION, SOLUTION INTRAMUSCULAR; INTRAVENOUS; SUBCUTANEOUS
Status: DISCONTINUED | OUTPATIENT
Start: 2018-05-21 | End: 2018-05-28 | Stop reason: HOSPADM

## 2018-05-21 RX ORDER — CEFAZOLIN SODIUM 1 G/3ML
INJECTION, POWDER, FOR SOLUTION INTRAMUSCULAR; INTRAVENOUS
Status: DISCONTINUED | OUTPATIENT
Start: 2018-05-21 | End: 2018-05-21

## 2018-05-21 RX ORDER — ACETAMINOPHEN 10 MG/ML
INJECTION, SOLUTION INTRAVENOUS
Status: DISCONTINUED | OUTPATIENT
Start: 2018-05-21 | End: 2018-05-21

## 2018-05-21 RX ORDER — ETOMIDATE 2 MG/ML
INJECTION INTRAVENOUS
Status: DISCONTINUED | OUTPATIENT
Start: 2018-05-21 | End: 2018-05-21

## 2018-05-21 RX ORDER — ACETAMINOPHEN 10 MG/ML
1000 INJECTION, SOLUTION INTRAVENOUS EVERY 8 HOURS
Status: COMPLETED | OUTPATIENT
Start: 2018-05-21 | End: 2018-05-22

## 2018-05-21 RX ORDER — EPHEDRINE SULFATE 50 MG/ML
INJECTION, SOLUTION INTRAVENOUS
Status: DISCONTINUED | OUTPATIENT
Start: 2018-05-21 | End: 2018-05-21

## 2018-05-21 RX ORDER — FENTANYL CITRATE 50 UG/ML
INJECTION, SOLUTION INTRAMUSCULAR; INTRAVENOUS
Status: DISCONTINUED | OUTPATIENT
Start: 2018-05-21 | End: 2018-05-21

## 2018-05-21 RX ORDER — ROCURONIUM BROMIDE 10 MG/ML
INJECTION, SOLUTION INTRAVENOUS
Status: DISCONTINUED | OUTPATIENT
Start: 2018-05-21 | End: 2018-05-21

## 2018-05-21 RX ORDER — CEFAZOLIN SODIUM 1 G/3ML
2 INJECTION, POWDER, FOR SOLUTION INTRAMUSCULAR; INTRAVENOUS
Status: DISCONTINUED | OUTPATIENT
Start: 2018-05-21 | End: 2018-05-21

## 2018-05-21 RX ORDER — LIDOCAINE HCL/PF 100 MG/5ML
SYRINGE (ML) INTRAVENOUS
Status: DISCONTINUED | OUTPATIENT
Start: 2018-05-21 | End: 2018-05-21

## 2018-05-21 RX ADMIN — HYDROMORPHONE HYDROCHLORIDE 1 MG: 1 INJECTION, SOLUTION INTRAMUSCULAR; INTRAVENOUS; SUBCUTANEOUS at 05:05

## 2018-05-21 RX ADMIN — MEMANTINE HYDROCHLORIDE 10 MG: 5 TABLET ORAL at 09:05

## 2018-05-21 RX ADMIN — FENTANYL CITRATE 25 MCG: 50 INJECTION INTRAMUSCULAR; INTRAVENOUS at 02:05

## 2018-05-21 RX ADMIN — HYDROMORPHONE HYDROCHLORIDE 0.5 MG: 1 INJECTION, SOLUTION INTRAMUSCULAR; INTRAVENOUS; SUBCUTANEOUS at 09:05

## 2018-05-21 RX ADMIN — CLONIDINE HYDROCHLORIDE 0.3 MG: 0.1 TABLET ORAL at 04:05

## 2018-05-21 RX ADMIN — ROCURONIUM BROMIDE 20 MG: 10 INJECTION, SOLUTION INTRAVENOUS at 12:05

## 2018-05-21 RX ADMIN — SODIUM CHLORIDE, SODIUM GLUCONATE, SODIUM ACETATE, POTASSIUM CHLORIDE, MAGNESIUM CHLORIDE, SODIUM PHOSPHATE, DIBASIC, AND POTASSIUM PHOSPHATE: .53; .5; .37; .037; .03; .012; .00082 INJECTION, SOLUTION INTRAVENOUS at 10:05

## 2018-05-21 RX ADMIN — EPHEDRINE SULFATE 10 MG: 50 INJECTION, SOLUTION INTRAMUSCULAR; INTRAVENOUS; SUBCUTANEOUS at 12:05

## 2018-05-21 RX ADMIN — FENTANYL CITRATE 25 MCG: 50 INJECTION, SOLUTION INTRAMUSCULAR; INTRAVENOUS at 12:05

## 2018-05-21 RX ADMIN — Medication 40 MG: at 10:05

## 2018-05-21 RX ADMIN — MORPHINE SULFATE: 1 INJECTION INTRAVENOUS at 01:05

## 2018-05-21 RX ADMIN — SODIUM BICARBONATE 650 MG TABLET 650 MG: at 08:05

## 2018-05-21 RX ADMIN — SODIUM CHLORIDE: 0.9 INJECTION, SOLUTION INTRAVENOUS at 01:05

## 2018-05-21 RX ADMIN — CEFAZOLIN 2 G: 330 INJECTION, POWDER, FOR SOLUTION INTRAMUSCULAR; INTRAVENOUS at 10:05

## 2018-05-21 RX ADMIN — PHENYLEPHRINE HYDROCHLORIDE 100 MCG: 10 INJECTION INTRAVENOUS at 12:05

## 2018-05-21 RX ADMIN — SUCCINYLCHOLINE CHLORIDE 120 MG: 20 INJECTION, SOLUTION INTRAMUSCULAR; INTRAVENOUS at 10:05

## 2018-05-21 RX ADMIN — CARVEDILOL 25 MG: 25 TABLET, FILM COATED ORAL at 08:05

## 2018-05-21 RX ADMIN — IPRATROPIUM BROMIDE AND ALBUTEROL SULFATE 3 ML: 2.5; .5 SOLUTION RESPIRATORY (INHALATION) at 08:05

## 2018-05-21 RX ADMIN — FERROUS SULFATE TAB EC 325 MG (65 MG FE EQUIVALENT) 325 MG: 325 (65 FE) TABLET DELAYED RESPONSE at 08:05

## 2018-05-21 RX ADMIN — PANTOPRAZOLE SODIUM 40 MG: 40 TABLET, DELAYED RELEASE ORAL at 08:05

## 2018-05-21 RX ADMIN — CLONIDINE HYDROCHLORIDE 0.3 MG: 0.1 TABLET ORAL at 08:05

## 2018-05-21 RX ADMIN — MEMANTINE HYDROCHLORIDE 10 MG: 5 TABLET ORAL at 08:05

## 2018-05-21 RX ADMIN — DUTASTERIDE 0.5 MG: 0.5 CAPSULE, LIQUID FILLED ORAL at 08:05

## 2018-05-21 RX ADMIN — CLONIDINE HYDROCHLORIDE 0.3 MG: 0.1 TABLET ORAL at 09:05

## 2018-05-21 RX ADMIN — FENTANYL CITRATE 50 MCG: 50 INJECTION, SOLUTION INTRAMUSCULAR; INTRAVENOUS at 11:05

## 2018-05-21 RX ADMIN — IPRATROPIUM BROMIDE AND ALBUTEROL SULFATE 3 ML: 2.5; .5 SOLUTION RESPIRATORY (INHALATION) at 02:05

## 2018-05-21 RX ADMIN — SODIUM CHLORIDE: 0.9 INJECTION, SOLUTION INTRAVENOUS at 10:05

## 2018-05-21 RX ADMIN — FENTANYL CITRATE 25 MCG: 50 INJECTION INTRAMUSCULAR; INTRAVENOUS at 01:05

## 2018-05-21 RX ADMIN — Medication: at 01:05

## 2018-05-21 RX ADMIN — HYDRALAZINE HYDROCHLORIDE AND ISOSORBIDE DINITRATE 2 TABLET: 37.5; 2 TABLET, FILM COATED ORAL at 09:05

## 2018-05-21 RX ADMIN — FERROUS SULFATE TAB EC 325 MG (65 MG FE EQUIVALENT) 325 MG: 325 (65 FE) TABLET DELAYED RESPONSE at 09:05

## 2018-05-21 RX ADMIN — NEOSTIGMINE METHYLSULFATE 5 MG: 1 INJECTION INTRAVENOUS at 12:05

## 2018-05-21 RX ADMIN — HYDRALAZINE HYDROCHLORIDE 10 MG: 20 INJECTION INTRAMUSCULAR; INTRAVENOUS at 08:05

## 2018-05-21 RX ADMIN — ROCURONIUM BROMIDE 50 MG: 10 INJECTION, SOLUTION INTRAVENOUS at 10:05

## 2018-05-21 RX ADMIN — CARVEDILOL 25 MG: 25 TABLET, FILM COATED ORAL at 04:05

## 2018-05-21 RX ADMIN — ACETAMINOPHEN 1000 MG: 10 INJECTION, SOLUTION INTRAVENOUS at 06:05

## 2018-05-21 RX ADMIN — EPHEDRINE SULFATE 5 MG: 50 INJECTION, SOLUTION INTRAMUSCULAR; INTRAVENOUS; SUBCUTANEOUS at 12:05

## 2018-05-21 RX ADMIN — GLYCOPYRROLATE 0.6 MG: 0.2 INJECTION, SOLUTION INTRAMUSCULAR; INTRAVENOUS at 12:05

## 2018-05-21 RX ADMIN — ACETAMINOPHEN 1000 MG: 10 INJECTION, SOLUTION INTRAVENOUS at 11:05

## 2018-05-21 RX ADMIN — AMLODIPINE BESYLATE 10 MG: 10 TABLET ORAL at 08:05

## 2018-05-21 RX ADMIN — SODIUM BICARBONATE 650 MG TABLET 650 MG: at 09:05

## 2018-05-21 RX ADMIN — PHENYLEPHRINE HYDROCHLORIDE 50 MCG: 10 INJECTION INTRAVENOUS at 11:05

## 2018-05-21 RX ADMIN — HYDRALAZINE HYDROCHLORIDE AND ISOSORBIDE DINITRATE 2 TABLET: 37.5; 2 TABLET, FILM COATED ORAL at 08:05

## 2018-05-21 RX ADMIN — FENTANYL CITRATE 100 MCG: 50 INJECTION, SOLUTION INTRAMUSCULAR; INTRAVENOUS at 10:05

## 2018-05-21 RX ADMIN — ETOMIDATE 12 MG: 2 INJECTION, SOLUTION INTRAVENOUS at 10:05

## 2018-05-21 RX ADMIN — LIDOCAINE HYDROCHLORIDE 100 MG: 20 INJECTION, SOLUTION INTRAVENOUS at 10:05

## 2018-05-21 RX ADMIN — IPRATROPIUM BROMIDE AND ALBUTEROL SULFATE 3 ML: 2.5; .5 SOLUTION RESPIRATORY (INHALATION) at 07:05

## 2018-05-21 NOTE — PLAN OF CARE
Problem: Patient Care Overview  Goal: Plan of Care Review    Recommendations    Recommendation/Intervention:   1. Advance diet as medically feasible.   2. RD following.     Goals: meet >85% of EEN/EPN via po intake  Nutrition Goal Status: new

## 2018-05-21 NOTE — PLAN OF CARE
VSS. Patient states pain is tolerable, PCA in use, ETCO2  Monitor in place. No N&V, G-tube to gravity.  SCD's in place throughout duration in PACU. Transferred to , report given to CK Koenig. Pts family notified of transfer.

## 2018-05-21 NOTE — OP NOTE
DATE OF PROCEDURE:  05/21/2018.    PREOPERATIVE DIAGNOSIS:  Retroperitoneal mass.    POSTOPERATIVE DIAGNOSIS:  Retroperitoneal mass.    PROCEDURE:  Resection of left retroperitoneal mass including left adrenal gland   and insertion of gastrostomy tube; should include a modified to indicate   complexity as the mass measured 25 cm.  Splenic venorrhaphy.    OPERATIVE REPORT IN DETAIL:  The patient was brought to the operating room and   placed in supine position, prepped and draped in sterile fashion.  Once   satisfactory general anesthesia was induced, a midline incision was made from   xiphoid to below the umbilicus.  The peritoneal cavity was uneventfully opened   and then lysis of adhesions was required to free up the adherent omentum and   small bowel loops that were up against the anterior abdominal wall as a result   of his prior surgery.  The mass was obvious and immediately evident.  The   omentum was draped over the top and this was freed from its attachments   inferiorly and laterally.  The white line of Toldt was used to immobilize the   entirety of the colon.  There really was almost no splenic flexure.  The   transverse colon was identified and there really was colon up high along the   left upper quadrant as it had been displaced inferiorly by the growth of the   mass.  The lesser sac was opened just to ensure that we had the transverse colon   clearly identified and then this was fully mobilized off of the top of the   mass.  With this completed, a small tear was noted in likely with the splenic   vein and this was repaired with a single 5-0 Prolene suture on an RB needle.    This was a result of one of the feeding tributary veins from the tumor to the   splenic vein, so this was an inherent part of the operation and this venous   repair was accomplished.  With this completed, the mass was able to be fairly   easily mobilized from its retroperitoneal position.  It was elevated up into the   abdominal  wound and then actually ultimately removed.  Visualization of the   large defect with a mass previously was demonstrated multiple bleeding areas   likely from the adrenal vessels.  These were controlled with a combination of   LigaSure, cautery and then ultimately pressure and then 2 containers of Raquel   potato starch were used in order to maximize hemostasis.  After vigorous   abdominal irrigation, no significant bleeding was detected and an 18-Carney was   brought into the left upper quadrant stab incision and secured to the stomach in   a standard Witzel fashion and the midline fascia was then closed with a running   #1 PDS.  Skin was closed with clips.  The G-tube was secured to the skin with a   nylon suture.  So, the overall operative procedure should read exploratory   laparotomy, lysis of adhesions, left adrenalectomy with a modifier due to the   massive, splenic venorrhaphy and then gastrostomy tube insertion.      NAYELI/JOSE  dd: 05/21/2018 12:43:02 (CDT)  td: 05/21/2018 13:16:35 (CDT)  Doc ID   #[id number]  Job ID #[id number]    CC:

## 2018-05-21 NOTE — SUBJECTIVE & OBJECTIVE
Interval History:   Patient seen and examined  S/p adrenalectomy 5/21/18  Denies N/V, reports appropriate abdominal pain  +F/no BM  Pain well controlled  Afebrile/VSS    Medications:  Continuous Infusions:  Scheduled Meds:   albuterol-ipratropium  3 mL Nebulization Q6H WAKE    amLODIPine  10 mg Oral Daily    carvedilol  25 mg Oral BID WM    ceFAZolin (ANCEF) IVPB  2 g Intravenous Once Pre-Op    cloNIDine  0.3 mg Oral TID    dutasteride  0.5 mg Oral Daily    ferrous sulfate  325 mg Oral BID    isosorbide-hydrALAZINE 20-37.5 mg  2 tablet Oral TID    memantine  10 mg Oral BID    pantoprazole  40 mg Oral Daily    polyethylene glycol  17 g Oral Daily    sodium bicarbonate  1 tablet Oral TID     PRN Meds:acetaminophen, dextrose 50%, dextrose 50%, diphenhydrAMINE, glucagon (human recombinant), glucose, glucose, heparin (PORCINE), heparin (PORCINE), hydrALAZINE, hydrALAZINE, insulin aspart U-100, labetalol, ondansetron, promethazine (PHENERGAN) IVPB, sodium chloride 0.9%     Review of patient's allergies indicates:  No Known Allergies  Objective:     Vital Signs (Most Recent):  Temp: 98.3 °F (36.8 °C) (05/21/18 0435)  Pulse: 69 (05/21/18 0700)  Resp: 16 (05/21/18 0435)  BP: (!) 167/86 (05/21/18 0435)  SpO2: 98 % (05/21/18 0435) Vital Signs (24h Range):  Temp:  [96.2 °F (35.7 °C)-98.5 °F (36.9 °C)] 98.3 °F (36.8 °C)  Pulse:  [69-71] 69  Resp:  [16-24] 16  SpO2:  [92 %-98 %] 98 %  BP: (155-173)/(72-92) 167/86     Weight: 106.5 kg (234 lb 13 oz)  Body mass index is 29.35 kg/m².    Intake/Output - Last 3 Shifts       05/19 0700 - 05/20 0659 05/20 0700 - 05/21 0659 05/21 0700 - 05/22 0659    P.O. 1240 920     I.V. (mL/kg) 379.8 (3.6) 616.9 (5.8)     IV Piggyback 16 0     Total Intake(mL/kg) 1635.8 (15.4) 1536.9 (14.4)     Urine (mL/kg/hr) 1250 (0.5) 1050 (0.4)     Emesis/NG output 0 (0) 0 (0)     Stool 0 (0) 0 (0)     Blood  0 (0)     Total Output 1250 1050      Net +385.8 +486.9             Urine Occurrence 0 x 0 x      Stool Occurrence 0 x 0 x     Emesis Occurrence 0 x 0 x           Physical Exam   Constitutional: He is oriented to person, place, and time. He appears well-developed and well-nourished. No distress.   HENT:   Head: Normocephalic and atraumatic.   Cardiovascular: Normal rate and regular rhythm.    Pulmonary/Chest: Effort normal. No respiratory distress.   Abdominal:   Soft, appropriate TTP  Incision - clean, dry and intact  G-tube to gravity   Neurological: He is alert and oriented to person, place, and time.   Skin: Skin is warm and dry.   Psychiatric: He has a normal mood and affect. His behavior is normal.       Significant Labs:  CBC:     Recent Labs  Lab 05/22/18  0441   WBC 7.23   RBC 2.80*   HGB 7.5*   HCT 24.1*      MCV 86   MCH 26.8*   MCHC 31.1*     BMP:     Recent Labs  Lab 05/22/18 0441         K 3.7      CO2 23   BUN 21   CREATININE 2.2*   CALCIUM 8.9   MG 1.6

## 2018-05-21 NOTE — ASSESSMENT & PLAN NOTE
Patient is 83 yo M who presents with large necrotic left adrenal mass    -to OR today for adrenalectomy   -has been NPO p MN  -pain/nausea meds PRN  -bowel regimen - miralax  -hold heparin at 0500 for surgery tomorrow, second case  -PT/OT  -consents obtained

## 2018-05-21 NOTE — BRIEF OP NOTE
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  Left adrenal mass -- PERMANENT      05/21/18 0118

## 2018-05-21 NOTE — PT/OT/SLP DISCHARGE
Physical Therapy Discharge Summary    Name: Jose Cuadra  MRN: [id number]   Principal Problem: Non-ischemic cardiomyopathy     Patient Discharged from acute Physical Therapy on 2018.  Please refer to prior PT noted date on 2018 for functional status.     Assessment:     Patient has not met goals.    Objective:     GOALS:    Physical Therapy Goals        Problem: Physical Therapy Goal    Goal Priority Disciplines Outcome Goal Variances Interventions   Physical Therapy Goal     PT/OT, PT Ongoing (interventions implemented as appropriate)     Description:  Goals to be met by: 18     Patient will increase functional independence with mobility by performin. Supine to sit with Stand-by Assistance  2. Sit to supine with Stand-by Assistance  3. Sit to stand transfer with Stand-by Assistance  4. Gait  x >200 feet with Stand-by Assistance using Rolling Walker or LRAD  5. Lower extremity exercise program x20 reps per handout, with supervision                      Reasons for Discontinuation of Therapy Services  surgery      Plan:     Patient Discharged to: OR/PACU. Will need new PT eval and treat orders to resume therapy services.    Jonas Bond, PT  2018

## 2018-05-21 NOTE — PLAN OF CARE
Problem: Patient Care Overview  Goal: Plan of Care Review  Outcome: Ongoing (interventions implemented as appropriate)  POC reviewed with pt, pt verbalizes understanding. AAOx4. BP elevated, has not required PRN BP meds since surgery. On 2L NC.  SR with ventricular pacing on telemetry monitoring. A line correlating.  telfa island to midline incision with scant dried drainage. G tube to gravity. Pt tolerating po pills with G clamped for 30 minutes. PCA discontinued due to pt unable to hit button in the appropriate time frame. Pt was complaining of 10/10 pain. PRN dilaudid given, will reassess pain level. Carney catheter in place for retention, adequate UOP. Frequent rounds made. SCDs on. WCTM.

## 2018-05-21 NOTE — NURSING TRANSFER
Nursing Transfer Note      5/21/2018     Transfer   Transfer via stretecher  Transfer with ETCO2, cardiac monitoring, pulse ox    Transported by CK Hendrickson and ZHEN Verduzco  Medicines sent:morphine PCA; NS  Chart send with patient: yes  Notified:daughters, Darline and Germaine  Patient reassessed at:5/21/18

## 2018-05-21 NOTE — PLAN OF CARE
Problem: Patient Care Overview  Goal: Plan of Care Review  Outcome: Ongoing (interventions implemented as appropriate)  Plan of care reviewed with the patient , an 82 year old male with the DX of Adrenal Gland Tumor,,,, NPO at midnight in preporation for the surgery to remove,,,,, heparin drip continuous,, calabrese,,, distended abdomen,,, uses home Cpap,,, lower extremities edema,,, Vpaced,,, AC/HS,,, rested nicely thru the night,,, denies pain,,, bed locked and low call light in reach,,,,,,

## 2018-05-21 NOTE — PROGRESS NOTES
" [facility]  Adult Nutrition  Progress Note    SUMMARY       Recommendations    Recommendation/Intervention:   1. Advance diet as medically feasible.   2. RD following.     Goals: meet >85% of EEN/EPN via po intake  Nutrition Goal Status: new  Communication of RD Recs:  (POC)    Reason for Assessment    Reason for Assessment: NPO/clear liquids x 5 days  Diagnosis:  (non-ischemic cardiomyopathy)  Relevant Medical History: T2DM, HLD, HTN, CERD, dementia, RCC, possible colon CA, CKD 4, Afib w/ pacemaker, CHF, CAD  Interdisciplinary Rounds: attended  General Information Comments: Pt in OR today for adrenalectomy. Pt H&P states, pt has undergone right nephrectomy and colon resection of 22 cm "maybe for cancer".  Nutrition Discharge Planning: renal/diabetic diet    Nutrition/Diet History    Do you have any cultural, spiritual, Rastafari conflicts, given your current situation?: none stated  Factors Affecting Nutritional Intake: NPO    Anthropometrics    Temp: 97.8 °F (36.6 °C)  Height Method: Stated  Height: 6' 3" (190.5 cm)  Height (inches): 75 in  Weight Method: Bed Scale  Weight: 106.5 kg (234 lb 13 oz)  Weight (lb): 234.81 lb  Ideal Body Weight (IBW), Male: 196 lb  % Ideal Body Weight, Male (lb): 119.8 lb  BMI (Calculated): 29.4  BMI Grade: 25 - 29.9 - overweight       Lab/Procedures/Meds    Pertinent Labs Reviewed: reviewed  Pertinent Labs Comments: Cr 2.5H, GFR 23.4H, glucose 154H, Alk Phos 180H, ALT 9L  Pertinent Medications Reviewed: reviewed  Pertinent Medications Comments: amlodipine, carvedilol, ferrous sulfate, pantoprazole     Estimated/Assessed Needs    Weight Used For Calorie Calculations: 106.5 kg (234 lb 12.6 oz)  Energy Calorie Requirements (kcal): 2046 kcal/day  Energy Need Method: [place] (X 1.1)  Protein Requirements: 85-96 gm/day (0.8-0.9 gm/kg)  Weight Used For Protein Calculations: 106.5 kg (234 lb 12.6 oz)  Fluid Requirements (mL): per MD  Fluid Need Method: RDA " Method  RDA Method (mL): 2046     Nutrition Prescription Ordered    Current Diet Order: NPO    Evaluation of Received Nutrient/Fluid Intake    I/O: +1.97L since admit     % Intake of Estimated Energy Needs: 0 - 25 %  % Meal Intake: NPO    Nutrition Risk    Level of Risk/Frequency of Follow-up: high (f/u 2 X wk)     Assessment and Plan    Adrenal tumor    Contributing Nutrition Diagnosis  Inadequate Energy Intake    Related to (etiology):   Surgery      Signs and Symptoms (as evidenced by):   Pt NPO for adrenalectomy      Interventions/Recommendations (treatment strategy):  See recs    Nutrition Diagnosis Status:   New               Monitor and Evaluation    Food and Nutrient Intake: energy intake, food and beverage intake  Food and Nutrient Adminstration: diet order  Physical Activity and Function: nutrition-related ADLs and IADLs  Anthropometric Measurements: weight change, body mass index, weight  Biochemical Data, Medical Tests and Procedures: electrolyte and renal panel, gastrointestinal profile, glucose/endocrine profile, inflammatory profile, lipid profile  Nutrition-Focused Physical Findings: overall appearance     Nutrition Follow-Up    RD Follow-up?: Yes

## 2018-05-21 NOTE — ANESTHESIA POSTPROCEDURE EVALUATION
"Anesthesia Post Evaluation    Patient: Jose Cuadra    Procedure(s) Performed: Procedure(s) (LRB):  ADRENALECTOMY (Left)  INSERTION-TUBE-GASTROSTOMY (N/A)    Final Anesthesia Type: general  Patient location during evaluation: PACU  Patient participation: Yes- Able to Participate  Level of consciousness: awake and alert and oriented  Post-procedure vital signs: reviewed and stable  Pain management: adequate  Airway patency: patent  PONV status at discharge: No PONV  Anesthetic complications: no      Cardiovascular status: blood pressure returned to baseline  Respiratory status: unassisted, spontaneous ventilation and nasal cannula  Hydration status: euvolemic  Follow-up not needed.        Visit Vitals  BP (!) 164/82   Pulse 72   Temp 36.6 °C (97.8 °F) (Temporal)   Resp 16   Ht 6' 3" (1.905 m)   Wt 106.5 kg (234 lb 13 oz)   SpO2 100%   BMI 29.35 kg/m²       Pain/Madeleine Score: Pain Assessment Performed: Yes (5/21/2018  9:48 AM)  Presence of Pain: denies (5/21/2018  9:48 AM)  Pain Rating Prior to Med Admin: 0 (5/21/2018  9:48 AM)  Pain Rating Post Med Admin: 0 (5/21/2018  9:48 AM)      "

## 2018-05-21 NOTE — TRANSFER OF CARE
"Anesthesia Transfer of Care Note    Patient: Jose Cuadra    Procedure(s) Performed: Procedure(s) (LRB):  ADRENALECTOMY (Left)  INSERTION-TUBE-GASTROSTOMY (N/A)    Patient location: PACU    Anesthesia Type: general    Transport from OR: Transported from OR on 6-10 L/min O2 by face mask with adequate spontaneous ventilation    Post pain: adequate analgesia    Post assessment: no apparent anesthetic complications    Post vital signs: stable    Level of consciousness: awake and alert    Nausea/Vomiting: no nausea/vomiting    Complications: none    Transfer of care protocol was followed      Last vitals:   Visit Vitals  BP (!) 164/82   Pulse 72   Temp 36.6 °C (97.8 °F) (Temporal)   Resp 16   Ht 6' 3" (1.905 m)   Wt 106.5 kg (234 lb 13 oz)   SpO2 100%   BMI 29.35 kg/m²     "

## 2018-05-21 NOTE — NURSING TRANSFER
Nursing Transfer Note      5/21/2018     Transfer To: [facility]    Transfer via stretcher    Transfer with cardiac monitoring    Transported by tech    Medicines sent: none    Chart send with patient: Yes    Notified: daughter

## 2018-05-21 NOTE — SUBJECTIVE & OBJECTIVE
Interval History:   Patient seen and examined, no acute events overnight  Has been NPO p MN in anticipation of surgery today  Denies abdominal pain  +F/no BM  Afebrile/baseline hypertensive    Medications:  Continuous Infusions:  Scheduled Meds:   albuterol-ipratropium  3 mL Nebulization Q6H WAKE    amLODIPine  10 mg Oral Daily    carvedilol  25 mg Oral BID WM    ceFAZolin (ANCEF) IVPB  2 g Intravenous Once Pre-Op    cloNIDine  0.3 mg Oral TID    dutasteride  0.5 mg Oral Daily    ferrous sulfate  325 mg Oral BID    isosorbide-hydrALAZINE 20-37.5 mg  2 tablet Oral TID    memantine  10 mg Oral BID    pantoprazole  40 mg Oral Daily    polyethylene glycol  17 g Oral Daily    sodium bicarbonate  1 tablet Oral TID     PRN Meds:acetaminophen, dextrose 50%, dextrose 50%, diphenhydrAMINE, glucagon (human recombinant), glucose, glucose, heparin (PORCINE), heparin (PORCINE), hydrALAZINE, hydrALAZINE, insulin aspart U-100, labetalol, ondansetron, promethazine (PHENERGAN) IVPB, sodium chloride 0.9%     Review of patient's allergies indicates:  No Known Allergies  Objective:     Vital Signs (Most Recent):  Temp: 98.3 °F (36.8 °C) (05/21/18 0435)  Pulse: 69 (05/21/18 0435)  Resp: 16 (05/21/18 0435)  BP: (!) 167/86 (05/21/18 0435)  SpO2: 98 % (05/21/18 0435) Vital Signs (24h Range):  Temp:  [96.2 °F (35.7 °C)-98.5 °F (36.9 °C)] 98.3 °F (36.8 °C)  Pulse:  [69-71] 69  Resp:  [16-24] 16  SpO2:  [92 %-98 %] 98 %  BP: (155-173)/(72-92) 167/86     Weight: 106.5 kg (234 lb 13 oz)  Body mass index is 29.35 kg/m².    Intake/Output - Last 3 Shifts       05/19 0700 - 05/20 0659 05/20 0700 - 05/21 0659 05/21 0700 - 05/22 0659    P.O. 1240 920     I.V. (mL/kg) 379.8 (3.6) 616.9 (5.8)     IV Piggyback 16 0     Total Intake(mL/kg) 1635.8 (15.4) 1536.9 (14.4)     Urine (mL/kg/hr) 1250 (0.5) 1050 (0.4)     Emesis/NG output 0 (0) 0 (0)     Stool 0 (0) 0 (0)     Blood  0 (0)     Total Output 1250 1050      Net +385.8 +486.9              Urine Occurrence 0 x 0 x     Stool Occurrence 0 x 0 x     Emesis Occurrence 0 x 0 x           Physical Exam   Constitutional: He appears well-developed and well-nourished. No distress.   HENT:   Head: Normocephalic and atraumatic.   Cardiovascular: Normal rate.    Pulmonary/Chest: Effort normal. No respiratory distress.   Abdominal:   Soft, non-tender, large, hard, nontender LUQ mass    Genitourinary:   Genitourinary Comments: Carney in place   Neurological: He is alert.   Skin: Skin is warm and dry. He is not diaphoretic.   Psychiatric: He has a normal mood and affect. His behavior is normal.       Significant Labs:  CBC:   Recent Labs  Lab 05/21/18 0437   WBC 6.54   RBC 3.16*   HGB 8.4*   HCT 27.2*      MCV 86   MCH 26.6*   MCHC 30.9*     BMP:   Recent Labs  Lab 05/21/18 0437   *      K 3.7      CO2 25   BUN 22   CREATININE 2.5*   CALCIUM 9.6   MG 1.9     CMP:   Recent Labs  Lab 05/21/18 0437   *   CALCIUM 9.6   ALBUMIN 2.2*   PROT 6.8      K 3.7   CO2 25      BUN 22   CREATININE 2.5*   ALKPHOS 180*   ALT 9*   AST 13   BILITOT 0.6     LFTs:   Recent Labs  Lab 05/21/18 0437   ALT 9*   AST 13   ALKPHOS 180*   BILITOT 0.6   PROT 6.8   ALBUMIN 2.2*     Coagulation:   Recent Labs  Lab 05/14/18  2051   LABPROT 11.0   INR 1.1   APTT 24.7

## 2018-05-21 NOTE — ASSESSMENT & PLAN NOTE
Contributing Nutrition Diagnosis  Inadequate Energy Intake    Related to (etiology):   Surgery      Signs and Symptoms (as evidenced by):   Pt NPO for adrenalectomy      Interventions/Recommendations (treatment strategy):  See recs    Nutrition Diagnosis Status:   New

## 2018-05-21 NOTE — PROGRESS NOTES
"[facility]  General Surgery  Progress Note    Subjective:     History of Present Illness:  Patient is an 83 yo M with history of dementia, RCC, possible colon CA, CKD 4, Afib with pacemaker, CHF with EF 36%, and a large abdominal (likely adrenal) mass who presented to OSH 5/2/18 with pneumonia and during his admission was found to have a large adrenal mass that was enlarged from previous CT scan, and he was transferred here for further workup. A CT scan from 2016 showed a similar sized mass. He underwent biopsy which showed no obvious malignancy, mostly necrotic tissue. He has completed his course of abx for pneumonia and his SOA has resolved.    When asked, he states the mass has been present for years. He denies any nausea, vomiting, weight loss, fevers, or chills. He continues to have bowel movements, with bowel incontinence (he says that's his baseline). He has been off anticoagulation despite being in afib. He has been having issues urinating.    He has undergone right nephrectomy and colon resection of 22 cm "maybe for cancer".    Post-Op Info:  Procedure(s) (LRB):  ADRENALECTOMY (N/A)         Interval History:   Patient seen and examined, no acute events overnight  Has been NPO p MN in anticipation of surgery today  Denies abdominal pain  +F/no BM  Afebrile/baseline hypertensive    Medications:  Continuous Infusions:  Scheduled Meds:   albuterol-ipratropium  3 mL Nebulization Q6H WAKE    amLODIPine  10 mg Oral Daily    carvedilol  25 mg Oral BID WM    ceFAZolin (ANCEF) IVPB  2 g Intravenous Once Pre-Op    cloNIDine  0.3 mg Oral TID    dutasteride  0.5 mg Oral Daily    ferrous sulfate  325 mg Oral BID    isosorbide-hydrALAZINE 20-37.5 mg  2 tablet Oral TID    memantine  10 mg Oral BID    pantoprazole  40 mg Oral Daily    polyethylene glycol  17 g Oral Daily    sodium bicarbonate  1 tablet Oral TID     PRN Meds:acetaminophen, dextrose 50%, dextrose 50%, diphenhydrAMINE, glucagon " (human recombinant), glucose, glucose, heparin (PORCINE), heparin (PORCINE), hydrALAZINE, hydrALAZINE, insulin aspart U-100, labetalol, ondansetron, promethazine (PHENERGAN) IVPB, sodium chloride 0.9%     Review of patient's allergies indicates:  No Known Allergies  Objective:     Vital Signs (Most Recent):  Temp: 98.3 °F (36.8 °C) (05/21/18 0435)  Pulse: 69 (05/21/18 0435)  Resp: 16 (05/21/18 0435)  BP: (!) 167/86 (05/21/18 0435)  SpO2: 98 % (05/21/18 0435) Vital Signs (24h Range):  Temp:  [96.2 °F (35.7 °C)-98.5 °F (36.9 °C)] 98.3 °F (36.8 °C)  Pulse:  [69-71] 69  Resp:  [16-24] 16  SpO2:  [92 %-98 %] 98 %  BP: (155-173)/(72-92) 167/86     Weight: 106.5 kg (234 lb 13 oz)  Body mass index is 29.35 kg/m².    Intake/Output - Last 3 Shifts       05/19 0700 - 05/20 0659 05/20 0700 - 05/21 0659 05/21 0700 - 05/22 0659    P.O. 1240 920     I.V. (mL/kg) 379.8 (3.6) 616.9 (5.8)     IV Piggyback 16 0     Total Intake(mL/kg) 1635.8 (15.4) 1536.9 (14.4)     Urine (mL/kg/hr) 1250 (0.5) 1050 (0.4)     Emesis/NG output 0 (0) 0 (0)     Stool 0 (0) 0 (0)     Blood  0 (0)     Total Output 1250 1050      Net +385.8 +486.9             Urine Occurrence 0 x 0 x     Stool Occurrence 0 x 0 x     Emesis Occurrence 0 x 0 x           Physical Exam   Constitutional: He appears well-developed and well-nourished. No distress.   HENT:   Head: Normocephalic and atraumatic.   Cardiovascular: Normal rate.    Pulmonary/Chest: Effort normal. No respiratory distress.   Abdominal:   Soft, non-tender, large, hard, nontender LUQ mass    Genitourinary:   Genitourinary Comments: Carney in place   Neurological: He is alert.   Skin: Skin is warm and dry. He is not diaphoretic.   Psychiatric: He has a normal mood and affect. His behavior is normal.       Significant Labs:  CBC:   Recent Labs  Lab 05/21/18  0437   WBC 6.54   RBC 3.16*   HGB 8.4*   HCT 27.2*      MCV 86   MCH 26.6*   MCHC 30.9*     BMP:   Recent Labs  Lab 05/21/18  0437   *   NA  "136   K 3.7      CO2 25   BUN 22   CREATININE 2.5*   CALCIUM 9.6   MG 1.9     CMP:   Recent Labs  Lab 05/21/18  0437   *   CALCIUM 9.6   ALBUMIN 2.2*   PROT 6.8      K 3.7   CO2 25      BUN 22   CREATININE 2.5*   ALKPHOS 180*   ALT 9*   AST 13   BILITOT 0.6     LFTs:   Recent Labs  Lab 05/21/18  0437   ALT 9*   AST 13   ALKPHOS 180*   BILITOT 0.6   PROT 6.8   ALBUMIN 2.2*     Coagulation:   Recent Labs  Lab 05/14/18 2051   LABPROT 11.0   INR 1.1   APTT 24.7     Assessment/Plan:     * Non-ischemic cardiomyopathy    Home meds  -amlodipine, clonidine, carvedilol, bidil          Dementia    Home meds  -memantine        Urinary retention    Home meds  -dutasteride  -calabrese        Adrenal tumor    Patient is 81 yo M who presents with large necrotic left adrenal mass    -to OR today for adrenalectomy   -has been NPO p MN  -pain/nausea meds PRN  -bowel regimen - miralax  -hold heparin at 0500 for surgery tomorrow, second case  -PT/OT  -consents obtained        Type 2 diabetes mellitus with diabetic peripheral angiopathy without gangrene, without long-term current use of insulin    SSI, accuchecks        Microcytic anemia    Iron supplement        Chronic atrial fibrillation    Tele  Heparin gtt  "Moderate risk - Cardiology would do nothing more to prep this patient for said surgery."            Lexi Tolentino PA-C   [id number]  General Surgery  [facility]  "

## 2018-05-21 NOTE — ANESTHESIA PROCEDURE NOTES
Arterial    Diagnosis: Adrenal mass    Patient location during procedure: pre-op  Procedure start time: 5/21/2018 10:21 AM  Timeout: 5/21/2018 10:20 AM  Procedure end time: 5/21/2018 10:22 AM  Staffing  Resident/CRNA: CHELSEY FAN  Performed: resident/CRNA   Anesthesiologist was present at the time of the procedure.  Preanesthetic Checklist  Completed: patient identified, site marked, surgical consent, pre-op evaluation, timeout performed, IV checked, risks and benefits discussed, monitors and equipment checked and anesthesia consent givenArterial  Skin Prep: chlorhexidine gluconate  Local Infiltration: lidocaine  Orientation: right  Location: radial  Catheter Size: 20 G  Catheter placement by Anatomical landmarks. Heme positive aspiration all ports.Insertion Attempts: 1  Assessment  Dressing: secured with tape and tegaderm  Patient: Tolerated well

## 2018-05-22 PROBLEM — E83.42 HYPOMAGNESEMIA: Status: ACTIVE | Noted: 2018-05-22

## 2018-05-22 LAB
ALBUMIN SERPL BCP-MCNC: 1.7 G/DL
ALP SERPL-CCNC: 140 U/L
ALT SERPL W/O P-5'-P-CCNC: 6 U/L
ANION GAP SERPL CALC-SCNC: 7 MMOL/L
ANION GAP SERPL CALC-SCNC: 9 MMOL/L
AST SERPL-CCNC: 12 U/L
BASOPHILS # BLD AUTO: 0.04 K/UL
BASOPHILS # BLD AUTO: 0.05 K/UL
BASOPHILS NFR BLD: 0.5 %
BASOPHILS NFR BLD: 0.6 %
BILIRUB SERPL-MCNC: 1 MG/DL
BUN SERPL-MCNC: 21 MG/DL
BUN SERPL-MCNC: 24 MG/DL
CALCIUM SERPL-MCNC: 8.9 MG/DL
CALCIUM SERPL-MCNC: 9.1 MG/DL
CHLORIDE SERPL-SCNC: 109 MMOL/L
CHLORIDE SERPL-SCNC: 109 MMOL/L
CO2 SERPL-SCNC: 23 MMOL/L
CO2 SERPL-SCNC: 23 MMOL/L
CREAT SERPL-MCNC: 2.2 MG/DL
CREAT SERPL-MCNC: 2.6 MG/DL
DIFFERENTIAL METHOD: ABNORMAL
DIFFERENTIAL METHOD: ABNORMAL
EOSINOPHIL # BLD AUTO: 0.1 K/UL
EOSINOPHIL # BLD AUTO: 0.1 K/UL
EOSINOPHIL NFR BLD: 0.6 %
EOSINOPHIL NFR BLD: 1 %
ERYTHROCYTE [DISTWIDTH] IN BLOOD BY AUTOMATED COUNT: 18.9 %
ERYTHROCYTE [DISTWIDTH] IN BLOOD BY AUTOMATED COUNT: 19 %
EST. GFR  (AFRICAN AMERICAN): 25.8 ML/MIN/1.73 M^2
EST. GFR  (AFRICAN AMERICAN): 31.5 ML/MIN/1.73 M^2
EST. GFR  (NON AFRICAN AMERICAN): 22.3 ML/MIN/1.73 M^2
EST. GFR  (NON AFRICAN AMERICAN): 27.3 ML/MIN/1.73 M^2
GLUCOSE SERPL-MCNC: 103 MG/DL
GLUCOSE SERPL-MCNC: 122 MG/DL
HCT VFR BLD AUTO: 23.1 %
HCT VFR BLD AUTO: 24.1 %
HGB BLD-MCNC: 7.1 G/DL
HGB BLD-MCNC: 7.5 G/DL
IMM GRANULOCYTES # BLD AUTO: 0.04 K/UL
IMM GRANULOCYTES # BLD AUTO: 0.08 K/UL
IMM GRANULOCYTES NFR BLD AUTO: 0.6 %
IMM GRANULOCYTES NFR BLD AUTO: 0.8 %
LYMPHOCYTES # BLD AUTO: 0.8 K/UL
LYMPHOCYTES # BLD AUTO: 1 K/UL
LYMPHOCYTES NFR BLD: 11.3 %
LYMPHOCYTES NFR BLD: 9.7 %
MAGNESIUM SERPL-MCNC: 1.6 MG/DL
MCH RBC QN AUTO: 26.8 PG
MCH RBC QN AUTO: 27.2 PG
MCHC RBC AUTO-ENTMCNC: 30.7 G/DL
MCHC RBC AUTO-ENTMCNC: 31.1 G/DL
MCV RBC AUTO: 86 FL
MCV RBC AUTO: 89 FL
MONOCYTES # BLD AUTO: 0.7 K/UL
MONOCYTES # BLD AUTO: 1.1 K/UL
MONOCYTES NFR BLD: 10 %
MONOCYTES NFR BLD: 9.1 %
NEUTROPHILS # BLD AUTO: 5.6 K/UL
NEUTROPHILS # BLD AUTO: 8.3 K/UL
NEUTROPHILS NFR BLD: 77.4 %
NEUTROPHILS NFR BLD: 78.4 %
NRBC BLD-RTO: 0 /100 WBC
NRBC BLD-RTO: 0 /100 WBC
PHOSPHATE SERPL-MCNC: 2.9 MG/DL
PLATELET # BLD AUTO: 240 K/UL
PLATELET # BLD AUTO: 251 K/UL
PMV BLD AUTO: 12.3 FL
PMV BLD AUTO: 12.3 FL
POCT GLUCOSE: 102 MG/DL (ref 70–110)
POCT GLUCOSE: 108 MG/DL (ref 70–110)
POCT GLUCOSE: 122 MG/DL (ref 70–110)
POCT GLUCOSE: 125 MG/DL (ref 70–110)
POTASSIUM SERPL-SCNC: 3.7 MMOL/L
POTASSIUM SERPL-SCNC: 4 MMOL/L
PROT SERPL-MCNC: 5.6 G/DL
RBC # BLD AUTO: 2.61 M/UL
RBC # BLD AUTO: 2.8 M/UL
SODIUM SERPL-SCNC: 139 MMOL/L
SODIUM SERPL-SCNC: 141 MMOL/L
WBC # BLD AUTO: 10.6 K/UL
WBC # BLD AUTO: 7.23 K/UL

## 2018-05-22 PROCEDURE — 94761 N-INVAS EAR/PLS OXIMETRY MLT: CPT

## 2018-05-22 PROCEDURE — 83735 ASSAY OF MAGNESIUM: CPT

## 2018-05-22 PROCEDURE — 84100 ASSAY OF PHOSPHORUS: CPT

## 2018-05-22 PROCEDURE — P9021 RED BLOOD CELLS UNIT: HCPCS

## 2018-05-22 PROCEDURE — 94799 UNLISTED PULMONARY SVC/PX: CPT

## 2018-05-22 PROCEDURE — 94664 DEMO&/EVAL PT USE INHALER: CPT

## 2018-05-22 PROCEDURE — 25000242 PHARM REV CODE 250 ALT 637 W/ HCPCS: Performed by: STUDENT IN AN ORGANIZED HEALTH CARE EDUCATION/TRAINING PROGRAM

## 2018-05-22 PROCEDURE — 94668 MNPJ CHEST WALL SBSQ: CPT

## 2018-05-22 PROCEDURE — 36430 TRANSFUSION BLD/BLD COMPNT: CPT

## 2018-05-22 PROCEDURE — 25000003 PHARM REV CODE 250: Performed by: STUDENT IN AN ORGANIZED HEALTH CARE EDUCATION/TRAINING PROGRAM

## 2018-05-22 PROCEDURE — 80048 BASIC METABOLIC PNL TOTAL CA: CPT

## 2018-05-22 PROCEDURE — 20600001 HC STEP DOWN PRIVATE ROOM

## 2018-05-22 PROCEDURE — 63600175 PHARM REV CODE 636 W HCPCS: Performed by: PHYSICIAN ASSISTANT

## 2018-05-22 PROCEDURE — 80053 COMPREHEN METABOLIC PANEL: CPT

## 2018-05-22 PROCEDURE — 27000221 HC OXYGEN, UP TO 24 HOURS

## 2018-05-22 PROCEDURE — 27000646 HC AEROBIKA DEVICE

## 2018-05-22 PROCEDURE — 99900035 HC TECH TIME PER 15 MIN (STAT)

## 2018-05-22 PROCEDURE — 63600175 PHARM REV CODE 636 W HCPCS: Performed by: SURGERY

## 2018-05-22 PROCEDURE — 36415 COLL VENOUS BLD VENIPUNCTURE: CPT

## 2018-05-22 PROCEDURE — 94667 MNPJ CHEST WALL 1ST: CPT

## 2018-05-22 PROCEDURE — 85025 COMPLETE CBC W/AUTO DIFF WBC: CPT

## 2018-05-22 PROCEDURE — 94640 AIRWAY INHALATION TREATMENT: CPT

## 2018-05-22 PROCEDURE — 25000003 PHARM REV CODE 250: Performed by: SURGERY

## 2018-05-22 PROCEDURE — 63600175 PHARM REV CODE 636 W HCPCS: Performed by: STUDENT IN AN ORGANIZED HEALTH CARE EDUCATION/TRAINING PROGRAM

## 2018-05-22 RX ORDER — GUAIFENESIN 600 MG/1
600 TABLET, EXTENDED RELEASE ORAL 2 TIMES DAILY
Status: DISCONTINUED | OUTPATIENT
Start: 2018-05-22 | End: 2018-05-28 | Stop reason: HOSPADM

## 2018-05-22 RX ORDER — MAGNESIUM SULFATE HEPTAHYDRATE 40 MG/ML
2 INJECTION, SOLUTION INTRAVENOUS ONCE
Status: COMPLETED | OUTPATIENT
Start: 2018-05-22 | End: 2018-05-22

## 2018-05-22 RX ORDER — HYDROCODONE BITARTRATE AND ACETAMINOPHEN 500; 5 MG/1; MG/1
TABLET ORAL
Status: DISCONTINUED | OUTPATIENT
Start: 2018-05-22 | End: 2018-05-28

## 2018-05-22 RX ADMIN — DUTASTERIDE 0.5 MG: 0.5 CAPSULE, LIQUID FILLED ORAL at 09:05

## 2018-05-22 RX ADMIN — FERROUS SULFATE TAB EC 325 MG (65 MG FE EQUIVALENT) 325 MG: 325 (65 FE) TABLET DELAYED RESPONSE at 08:05

## 2018-05-22 RX ADMIN — MEMANTINE HYDROCHLORIDE 10 MG: 5 TABLET ORAL at 08:05

## 2018-05-22 RX ADMIN — PANTOPRAZOLE SODIUM 40 MG: 40 TABLET, DELAYED RELEASE ORAL at 09:05

## 2018-05-22 RX ADMIN — HYDROMORPHONE HYDROCHLORIDE 1 MG: 1 INJECTION, SOLUTION INTRAMUSCULAR; INTRAVENOUS; SUBCUTANEOUS at 03:05

## 2018-05-22 RX ADMIN — MAGNESIUM SULFATE IN WATER 2 G: 40 INJECTION, SOLUTION INTRAVENOUS at 09:05

## 2018-05-22 RX ADMIN — GUAIFENESIN 600 MG: 600 TABLET, EXTENDED RELEASE ORAL at 08:05

## 2018-05-22 RX ADMIN — HYDROMORPHONE HYDROCHLORIDE 1 MG: 1 INJECTION, SOLUTION INTRAMUSCULAR; INTRAVENOUS; SUBCUTANEOUS at 08:05

## 2018-05-22 RX ADMIN — GUAIFENESIN 600 MG: 600 TABLET, EXTENDED RELEASE ORAL at 10:05

## 2018-05-22 RX ADMIN — HYDROMORPHONE HYDROCHLORIDE 0.5 MG: 1 INJECTION, SOLUTION INTRAMUSCULAR; INTRAVENOUS; SUBCUTANEOUS at 09:05

## 2018-05-22 RX ADMIN — SODIUM BICARBONATE 650 MG TABLET 650 MG: at 09:05

## 2018-05-22 RX ADMIN — CLONIDINE HYDROCHLORIDE 0.3 MG: 0.1 TABLET ORAL at 02:05

## 2018-05-22 RX ADMIN — CARVEDILOL 25 MG: 25 TABLET, FILM COATED ORAL at 09:05

## 2018-05-22 RX ADMIN — ACETAMINOPHEN 1000 MG: 10 INJECTION, SOLUTION INTRAVENOUS at 11:05

## 2018-05-22 RX ADMIN — SODIUM BICARBONATE 650 MG TABLET 650 MG: at 02:05

## 2018-05-22 RX ADMIN — HYDRALAZINE HYDROCHLORIDE AND ISOSORBIDE DINITRATE 2 TABLET: 37.5; 2 TABLET, FILM COATED ORAL at 02:05

## 2018-05-22 RX ADMIN — HYDROMORPHONE HYDROCHLORIDE 1 MG: 1 INJECTION, SOLUTION INTRAMUSCULAR; INTRAVENOUS; SUBCUTANEOUS at 12:05

## 2018-05-22 RX ADMIN — MEMANTINE HYDROCHLORIDE 10 MG: 5 TABLET ORAL at 09:05

## 2018-05-22 RX ADMIN — IPRATROPIUM BROMIDE AND ALBUTEROL SULFATE 3 ML: 2.5; .5 SOLUTION RESPIRATORY (INHALATION) at 07:05

## 2018-05-22 RX ADMIN — HYDRALAZINE HYDROCHLORIDE AND ISOSORBIDE DINITRATE 2 TABLET: 37.5; 2 TABLET, FILM COATED ORAL at 09:05

## 2018-05-22 RX ADMIN — ACETAMINOPHEN 1000 MG: 10 INJECTION, SOLUTION INTRAVENOUS at 03:05

## 2018-05-22 RX ADMIN — FERROUS SULFATE TAB EC 325 MG (65 MG FE EQUIVALENT) 325 MG: 325 (65 FE) TABLET DELAYED RESPONSE at 09:05

## 2018-05-22 RX ADMIN — AMLODIPINE BESYLATE 10 MG: 10 TABLET ORAL at 09:05

## 2018-05-22 RX ADMIN — HYDROMORPHONE HYDROCHLORIDE 0.5 MG: 1 INJECTION, SOLUTION INTRAMUSCULAR; INTRAVENOUS; SUBCUTANEOUS at 03:05

## 2018-05-22 RX ADMIN — IPRATROPIUM BROMIDE AND ALBUTEROL SULFATE 3 ML: 2.5; .5 SOLUTION RESPIRATORY (INHALATION) at 12:05

## 2018-05-22 RX ADMIN — SODIUM BICARBONATE 650 MG TABLET 650 MG: at 08:05

## 2018-05-22 RX ADMIN — CLONIDINE HYDROCHLORIDE 0.3 MG: 0.1 TABLET ORAL at 09:05

## 2018-05-22 RX ADMIN — SODIUM CHLORIDE 1000 ML: 0.9 INJECTION, SOLUTION INTRAVENOUS at 04:05

## 2018-05-22 RX ADMIN — SODIUM CHLORIDE: 0.9 INJECTION, SOLUTION INTRAVENOUS at 07:05

## 2018-05-22 NOTE — PLAN OF CARE
Problem: Patient Care Overview  Goal: Plan of Care Review  Outcome: Ongoing (interventions implemented as appropriate)  POC reviewed with pt and pt's family. BP now stable, see previous note. Holding all BP meds for now per Dr. Vargas. 1L bolus infusing. 1 URBC infusing. ML incision with telfa. Dried drainage from 5/21 not spread. Continues to complain of abdominal pain. SCDs on. Respiratory treatments changed to Q4H. Pt using acapella and IS. remains NPO with meds. G remains to gravity bag, minimal output.  Tolerating mouth swabs but complains of dry mouth. No skin breakdown on pressure areas. Skin bruised. A line d/c'd. Carney catheter intact, averaging 30 ml/h UOP. No falls or injuries. Pt was up in chair this shift but did not walk. No flatus. No BM. Frequent rounds made. WCTM.

## 2018-05-22 NOTE — PROGRESS NOTES
"[facility]  General Surgery  Progress Note    Subjective:     History of Present Illness:  Patient is an 83 yo M with history of dementia, RCC, possible colon CA, CKD 4, Afib with pacemaker, CHF with EF 36%, and a large abdominal (likely adrenal) mass who presented to OSH 5/2/18 with pneumonia and during his admission was found to have a large adrenal mass that was enlarged from previous CT scan, and he was transferred here for further workup. A CT scan from 2016 showed a similar sized mass. He underwent biopsy which showed no obvious malignancy, mostly necrotic tissue. He has completed his course of abx for pneumonia and his SOA has resolved.    When asked, he states the mass has been present for years. He denies any nausea, vomiting, weight loss, fevers, or chills. He continues to have bowel movements, with bowel incontinence (he says that's his baseline). He has been off anticoagulation despite being in afib. He has been having issues urinating.    He has undergone right nephrectomy and colon resection of 22 cm "maybe for cancer".    Post-Op Info:  Procedure(s) (LRB):  ADRENALECTOMY (Left)  INSERTION-TUBE-GASTROSTOMY (N/A)   1 Day Post-Op     Interval History:   Patient seen and examined  S/p adrenalectomy 5/21/18  Denies N/V, reports appropriate abdominal pain  +F/no BM  Pain well controlled  Afebrile/VSS    Medications:  Continuous Infusions:  Scheduled Meds:   albuterol-ipratropium  3 mL Nebulization Q6H WAKE    amLODIPine  10 mg Oral Daily    carvedilol  25 mg Oral BID WM    ceFAZolin (ANCEF) IVPB  2 g Intravenous Once Pre-Op    cloNIDine  0.3 mg Oral TID    dutasteride  0.5 mg Oral Daily    ferrous sulfate  325 mg Oral BID    isosorbide-hydrALAZINE 20-37.5 mg  2 tablet Oral TID    memantine  10 mg Oral BID    pantoprazole  40 mg Oral Daily    polyethylene glycol  17 g Oral Daily    sodium bicarbonate  1 tablet Oral TID     PRN Meds:acetaminophen, dextrose 50%, dextrose 50%, " diphenhydrAMINE, glucagon (human recombinant), glucose, glucose, heparin (PORCINE), heparin (PORCINE), hydrALAZINE, hydrALAZINE, insulin aspart U-100, labetalol, ondansetron, promethazine (PHENERGAN) IVPB, sodium chloride 0.9%     Review of patient's allergies indicates:  No Known Allergies  Objective:     Vital Signs (Most Recent):  Temp: 98.3 °F (36.8 °C) (05/21/18 0435)  Pulse: 69 (05/21/18 0700)  Resp: 16 (05/21/18 0435)  BP: (!) 167/86 (05/21/18 0435)  SpO2: 98 % (05/21/18 0435) Vital Signs (24h Range):  Temp:  [96.2 °F (35.7 °C)-98.5 °F (36.9 °C)] 98.3 °F (36.8 °C)  Pulse:  [69-71] 69  Resp:  [16-24] 16  SpO2:  [92 %-98 %] 98 %  BP: (155-173)/(72-92) 167/86     Weight: 106.5 kg (234 lb 13 oz)  Body mass index is 29.35 kg/m².    Intake/Output - Last 3 Shifts       05/19 0700 - 05/20 0659 05/20 0700 - 05/21 0659 05/21 0700 - 05/22 0659    P.O. 1240 920     I.V. (mL/kg) 379.8 (3.6) 616.9 (5.8)     IV Piggyback 16 0     Total Intake(mL/kg) 1635.8 (15.4) 1536.9 (14.4)     Urine (mL/kg/hr) 1250 (0.5) 1050 (0.4)     Emesis/NG output 0 (0) 0 (0)     Stool 0 (0) 0 (0)     Blood  0 (0)     Total Output 1250 1050      Net +385.8 +486.9             Urine Occurrence 0 x 0 x     Stool Occurrence 0 x 0 x     Emesis Occurrence 0 x 0 x           Physical Exam   Constitutional: He is oriented to person, place, and time. He appears well-developed and well-nourished. No distress.   HENT:   Head: Normocephalic and atraumatic.   Cardiovascular: Normal rate and regular rhythm.    Pulmonary/Chest: Effort normal. No respiratory distress.   Abdominal:   Soft, appropriate TTP  Incision - clean, dry and intact  G-tube to gravity   Neurological: He is alert and oriented to person, place, and time.   Skin: Skin is warm and dry.   Psychiatric: He has a normal mood and affect. His behavior is normal.       Significant Labs:  CBC:     Recent Labs  Lab 05/22/18  0441   WBC 7.23   RBC 2.80*   HGB 7.5*   HCT 24.1*      MCV 86   MCH 26.8*    [facility] 31.1*     BMP:     Recent Labs  Lab 05/22/18  0441         K 3.7      CO2 23   BUN 21   CREATININE 2.2*   CALCIUM 8.9   MG 1.6     Assessment/Plan:     * Adrenal tumor    Patient is 81 yo M who presents with large necrotic left adrenal mass, s/p adrenalectomy 5/21/18    -NPO  -IVF NS @ 50  -pain/nausea meds PRN  -bowel regimen - miralax  -d/c a-line  -PT/OT          Hypomagnesemia    Replace         Dementia    Home meds  -memantine        Urinary retention    Home meds  -dutasteride  -calabrese        Non-ischemic cardiomyopathy    Home meds  -amlodipine, clonidine, carvedilol, bidil          Type 2 diabetes mellitus with diabetic peripheral angiopathy without gangrene, without long-term current use of insulin    SSI, accuchecks        Microcytic anemia    Iron supplement        Chronic atrial fibrillation    Holding anticoagulation given increased bleeding risk            Lexi Tolentino PA-C   [id number]  General Surgery  [facility]

## 2018-05-22 NOTE — PROGRESS NOTES
Pt hypotensive, 80/50s. C/o dizziness. Pt had been moved from the chair in to the bed. Dr. Ford notified. Labs ordered. Pt placed back on 2L NC due to dipping into 88 range. 1L bolus ordered, running at 250 ml/h due to CHF hx. WCTM.

## 2018-05-22 NOTE — PROGRESS NOTES
Pt has incision up his belly to the tip of his rib cage at the bottom.  PA ordered CPT which I said was contraindicated on the from due to his incision.  RN called PA and they were unaware that CPT is done on the front as well as the back.  RN helped me hold up the pt do do CPT on his back and the pt was in a lot of pain.  The pt is having a hard time coughing up mucus that he said is stuck in his throat

## 2018-05-22 NOTE — PLAN OF CARE
Problem: Patient Care Overview  Goal: Plan of Care Review  Outcome: Ongoing (interventions implemented as appropriate)  Pt was AOx4 during night shift with no injury.  Pt had calabrese and G tube to gravity. G tube clamped for 30 min when oral medication given to pt. Pt did rest during the shift and when he needed to be awake for vitals pt was given IV pain medication. The A line did not corollate with the cuff pressure during night shift. The pt did not sleep with his CPap on.

## 2018-05-22 NOTE — PLAN OF CARE
Not medically stable for discharge;POD # 1: NPO, G-tube to gravity. Waiting for bowel function to return.        05/22/18 1223   Discharge Reassessment   Assessment Type Discharge Planning Reassessment   Provided patient/caregiver education on the expected discharge date and the discharge plan Yes  ([facility], then [facility] (Contact: Mary rochelle [phone]) as patients daughters have communicated to . This was arranged prior to this admit. Post-op PT/OT recs pending. VINICIUS Vaz, aware. D/C date per MD, may be 5/25/18. )   Do you have any problems affording any of your prescribed medications? No   Discharge Plan A Skilled Nursing Facility   Discharge Plan B Skilled Nursing Facility   Patient choice form signed by patient/caregiver N/A   Can the patient answer the patient profile reliably? Yes, cognitively intact   Describe the patient's ability to walk at the present time. Major restrictions/daily assistance from another person   How often would a person be available to care for the patient? Occasionally   Number of comorbid conditions (as recorded on the chart) Five or more

## 2018-05-22 NOTE — PROGRESS NOTES
Pt complaining of not being able to breath. O2 >94% on room air, respiration rate WDL. Pt stating he feels like he needs to cough something up. Unable to have a productive cough. Spoke with Dr. Ford; XRAY, Mucinex, and acapella treatment ordered. Pt using IS with encouragement.

## 2018-05-22 NOTE — ASSESSMENT & PLAN NOTE
Patient is 81 yo M who presents with large necrotic left adrenal mass, s/p adrenalectomy 5/21/18    -NPO  -IVF NS @ 50  -pain/nausea meds PRN  -bowel regimen - miralax  -d/c a-line  -PT/OT

## 2018-05-23 PROBLEM — D62 ACUTE BLOOD LOSS ANEMIA: Status: ACTIVE | Noted: 2018-05-23

## 2018-05-23 PROBLEM — J95.89 ACUTE RESPIRATORY INSUFFICIENCY, POSTOPERATIVE: Status: ACTIVE | Noted: 2018-05-23

## 2018-05-23 LAB
ALBUMIN SERPL BCP-MCNC: 1.8 G/DL
ALP SERPL-CCNC: 132 U/L
ALT SERPL W/O P-5'-P-CCNC: <5 U/L
ANION GAP SERPL CALC-SCNC: 9 MMOL/L
AST SERPL-CCNC: 9 U/L
BASOPHILS # BLD AUTO: 0.06 K/UL
BASOPHILS NFR BLD: 0.6 %
BILIRUB SERPL-MCNC: 1.4 MG/DL
BUN SERPL-MCNC: 26 MG/DL
CALCIUM SERPL-MCNC: 9.4 MG/DL
CHLORIDE SERPL-SCNC: 110 MMOL/L
CO2 SERPL-SCNC: 21 MMOL/L
CREAT SERPL-MCNC: 2.3 MG/DL
DIFFERENTIAL METHOD: ABNORMAL
EOSINOPHIL # BLD AUTO: 0.1 K/UL
EOSINOPHIL NFR BLD: 1.1 %
ERYTHROCYTE [DISTWIDTH] IN BLOOD BY AUTOMATED COUNT: 18.6 %
EST. GFR  (AFRICAN AMERICAN): 29.9 ML/MIN/1.73 M^2
EST. GFR  (NON AFRICAN AMERICAN): 25.9 ML/MIN/1.73 M^2
GLUCOSE SERPL-MCNC: 104 MG/DL
HCT VFR BLD AUTO: 26.7 %
HGB BLD-MCNC: 8.3 G/DL
IMM GRANULOCYTES # BLD AUTO: 0.08 K/UL
IMM GRANULOCYTES NFR BLD AUTO: 0.8 %
LYMPHOCYTES # BLD AUTO: 0.9 K/UL
LYMPHOCYTES NFR BLD: 8.4 %
MAGNESIUM SERPL-MCNC: 2.1 MG/DL
MCH RBC QN AUTO: 27.7 PG
MCHC RBC AUTO-ENTMCNC: 31.1 G/DL
MCV RBC AUTO: 89 FL
MONOCYTES # BLD AUTO: 1 K/UL
MONOCYTES NFR BLD: 10 %
NEUTROPHILS # BLD AUTO: 8 K/UL
NEUTROPHILS NFR BLD: 79.1 %
NRBC BLD-RTO: 0 /100 WBC
PHOSPHATE SERPL-MCNC: 3.7 MG/DL
PLATELET # BLD AUTO: 233 K/UL
PMV BLD AUTO: 12.2 FL
POCT GLUCOSE: 130 MG/DL (ref 70–110)
POCT GLUCOSE: 136 MG/DL (ref 70–110)
POTASSIUM SERPL-SCNC: 3.9 MMOL/L
PROT SERPL-MCNC: 5.8 G/DL
RBC # BLD AUTO: 3 M/UL
SODIUM SERPL-SCNC: 140 MMOL/L
WBC # BLD AUTO: 10.11 K/UL

## 2018-05-23 PROCEDURE — 20600001 HC STEP DOWN PRIVATE ROOM

## 2018-05-23 PROCEDURE — 27000221 HC OXYGEN, UP TO 24 HOURS

## 2018-05-23 PROCEDURE — 80053 COMPREHEN METABOLIC PANEL: CPT

## 2018-05-23 PROCEDURE — 94799 UNLISTED PULMONARY SVC/PX: CPT

## 2018-05-23 PROCEDURE — 97168 OT RE-EVAL EST PLAN CARE: CPT

## 2018-05-23 PROCEDURE — 84100 ASSAY OF PHOSPHORUS: CPT

## 2018-05-23 PROCEDURE — 25000003 PHARM REV CODE 250: Performed by: STUDENT IN AN ORGANIZED HEALTH CARE EDUCATION/TRAINING PROGRAM

## 2018-05-23 PROCEDURE — 63600175 PHARM REV CODE 636 W HCPCS: Performed by: STUDENT IN AN ORGANIZED HEALTH CARE EDUCATION/TRAINING PROGRAM

## 2018-05-23 PROCEDURE — G8979 MOBILITY GOAL STATUS: HCPCS | Mod: CJ

## 2018-05-23 PROCEDURE — 36415 COLL VENOUS BLD VENIPUNCTURE: CPT

## 2018-05-23 PROCEDURE — 25000242 PHARM REV CODE 250 ALT 637 W/ HCPCS: Performed by: STUDENT IN AN ORGANIZED HEALTH CARE EDUCATION/TRAINING PROGRAM

## 2018-05-23 PROCEDURE — 85025 COMPLETE CBC W/AUTO DIFF WBC: CPT

## 2018-05-23 PROCEDURE — 83735 ASSAY OF MAGNESIUM: CPT

## 2018-05-23 PROCEDURE — 97164 PT RE-EVAL EST PLAN CARE: CPT

## 2018-05-23 PROCEDURE — 94761 N-INVAS EAR/PLS OXIMETRY MLT: CPT

## 2018-05-23 PROCEDURE — G8978 MOBILITY CURRENT STATUS: HCPCS | Mod: CK

## 2018-05-23 PROCEDURE — 97116 GAIT TRAINING THERAPY: CPT

## 2018-05-23 PROCEDURE — 94640 AIRWAY INHALATION TREATMENT: CPT

## 2018-05-23 PROCEDURE — 97535 SELF CARE MNGMENT TRAINING: CPT

## 2018-05-23 PROCEDURE — 94664 DEMO&/EVAL PT USE INHALER: CPT

## 2018-05-23 RX ADMIN — HYDRALAZINE HYDROCHLORIDE AND ISOSORBIDE DINITRATE 2 TABLET: 37.5; 2 TABLET, FILM COATED ORAL at 08:05

## 2018-05-23 RX ADMIN — CARVEDILOL 25 MG: 25 TABLET, FILM COATED ORAL at 05:05

## 2018-05-23 RX ADMIN — SODIUM BICARBONATE 650 MG TABLET 650 MG: at 08:05

## 2018-05-23 RX ADMIN — HYDRALAZINE HYDROCHLORIDE AND ISOSORBIDE DINITRATE 2 TABLET: 37.5; 2 TABLET, FILM COATED ORAL at 03:05

## 2018-05-23 RX ADMIN — GUAIFENESIN 600 MG: 600 TABLET, EXTENDED RELEASE ORAL at 08:05

## 2018-05-23 RX ADMIN — IPRATROPIUM BROMIDE AND ALBUTEROL SULFATE 3 ML: 2.5; .5 SOLUTION RESPIRATORY (INHALATION) at 08:05

## 2018-05-23 RX ADMIN — ONDANSETRON 8 MG: 8 TABLET, ORALLY DISINTEGRATING ORAL at 05:05

## 2018-05-23 RX ADMIN — CLONIDINE HYDROCHLORIDE 0.3 MG: 0.1 TABLET ORAL at 08:05

## 2018-05-23 RX ADMIN — CARVEDILOL 25 MG: 25 TABLET, FILM COATED ORAL at 12:05

## 2018-05-23 RX ADMIN — FERROUS SULFATE TAB EC 325 MG (65 MG FE EQUIVALENT) 325 MG: 325 (65 FE) TABLET DELAYED RESPONSE at 08:05

## 2018-05-23 RX ADMIN — IPRATROPIUM BROMIDE AND ALBUTEROL SULFATE 3 ML: 2.5; .5 SOLUTION RESPIRATORY (INHALATION) at 01:05

## 2018-05-23 RX ADMIN — SODIUM BICARBONATE 650 MG TABLET 650 MG: at 03:05

## 2018-05-23 RX ADMIN — CLONIDINE HYDROCHLORIDE 0.3 MG: 0.1 TABLET ORAL at 03:05

## 2018-05-23 RX ADMIN — HYDROMORPHONE HYDROCHLORIDE 1 MG: 1 INJECTION, SOLUTION INTRAMUSCULAR; INTRAVENOUS; SUBCUTANEOUS at 01:05

## 2018-05-23 RX ADMIN — MEMANTINE HYDROCHLORIDE 10 MG: 5 TABLET ORAL at 08:05

## 2018-05-23 NOTE — PLAN OF CARE
Problem: Patient Care Overview  Goal: Plan of Care Review  Outcome: Ongoing (interventions implemented as appropriate)  Pt was AOx4 during night shift with no injury.  Pt had calabrese and G tube to gravity. G tube clamped for 30 min when oral medication given to pt. Pt did rest during the shift and when he needed to be awake for vitals pt was given IV pain medication.  The pt did not sleep with his CPap on.  The pt was restless during the night and requested that the nurse call the Dr to report he could not breath because something was down deep in his throat.  Dr Soliman was made aware of pt's complaint. Nurse is to continue to have the pt use the I/S but the pt reports he can not do it. The pt is on cont. Pulse ox and saturation level is 95% or higher on 2 liters of oxygen. The pt also reports that he can not cough because it hurts to much at the incisional site .

## 2018-05-23 NOTE — PLAN OF CARE
Per Ana MODI's, conversation with Pt's family, they had already started to work with Herita Latty on placement for Pt. Therapy saw Pt today for the first time post-op and recommended SNF. SW asked SSC team to please initiate a referral to this facility in [facility]. SW to continue to follow.     PAULO GonzalesW

## 2018-05-23 NOTE — ASSESSMENT & PLAN NOTE
Patient is 83 yo M who presents with large necrotic left adrenal mass, s/p adrenalectomy 5/21/18    -NPO  -IVF NS @ 50  -g-tube to gravity  -pain/nausea meds PRN  -bowel regimen - miralax  -PT/OT

## 2018-05-23 NOTE — PROGRESS NOTES
"[facility]  General Surgery  Progress Note    Subjective:     History of Present Illness:  Patient is an 81 yo M with history of dementia, RCC, possible colon CA, CKD 4, Afib with pacemaker, CHF with EF 36%, and a large abdominal (likely adrenal) mass who presented to OSH 5/2/18 with pneumonia and during his admission was found to have a large adrenal mass that was enlarged from previous CT scan, and he was transferred here for further workup. A CT scan from 2016 showed a similar sized mass. He underwent biopsy which showed no obvious malignancy, mostly necrotic tissue. He has completed his course of abx for pneumonia and his SOA has resolved.    When asked, he states the mass has been present for years. He denies any nausea, vomiting, weight loss, fevers, or chills. He continues to have bowel movements, with bowel incontinence (he says that's his baseline). He has been off anticoagulation despite being in afib. He has been having issues urinating.    He has undergone right nephrectomy and colon resection of 22 cm "maybe for cancer".    Post-Op Info:  Procedure(s) (LRB):  ADRENALECTOMY (Left)  INSERTION-TUBE-GASTROSTOMY (N/A)   2 Days Post-Op     Interval History:   Patient seen and examined, no acute events overnight  Denies N/V, reports abdominal pain and shortness of breath  Satting mid 90's on 2LNC  Denies F/no BM  g-tube to gravity, put out 200mL  Afebrile/VSS    Medications:  Continuous Infusions:   sodium chloride 0.9% 50 mL/hr at 05/22/18 5714     Scheduled Meds:   albuterol-ipratropium  3 mL Nebulization Q6H WAKE    carvedilol  25 mg Oral BID WM    cloNIDine  0.3 mg Oral TID    dutasteride  0.5 mg Oral Daily    ferrous sulfate  325 mg Oral BID    guaiFENesin  600 mg Oral BID    isosorbide-hydrALAZINE 20-37.5 mg  2 tablet Oral TID    memantine  10 mg Oral BID    pantoprazole  40 mg Oral Daily    sodium bicarbonate  1 tablet Oral TID     PRN Meds:sodium chloride, dextrose 50%, " dextrose 50%, glucagon (human recombinant), glucose, glucose, HYDROmorphone, HYDROmorphone, insulin aspart U-100, labetalol, naloxone, ondansetron, promethazine (PHENERGAN) IVPB, sodium chloride 0.9%     Review of patient's allergies indicates:  No Known Allergies  Objective:     Vital Signs (Most Recent):  Temp: 98.3 °F (36.8 °C) (05/23/18 0432)  Pulse: 68 (05/23/18 0432)  Resp: 20 (05/23/18 0432)  BP: (!) 152/67 (05/23/18 0432)  SpO2: 97 % (05/23/18 0432) Vital Signs (24h Range):  Temp:  [97.8 °F (36.6 °C)-98.7 °F (37.1 °C)] 98.3 °F (36.8 °C)  Pulse:  [68-97] 68  Resp:  [12-24] 20  SpO2:  [93 %-100 %] 97 %  BP: ()/(50-67) 152/67     Weight: 106.5 kg (234 lb 13 oz)  Body mass index is 29.35 kg/m².    Intake/Output - Last 3 Shifts       05/21 0700 - 05/22 0659 05/22 0700 - 05/23 0659    P.O. 40 250    I.V. (mL/kg) 2336.2 (21.9) 810 (7.6)    Blood  293.8    IV Piggyback 200 1100    Total Intake(mL/kg) 2576.2 (24.2) 2453.8 (23)    Urine (mL/kg/hr) 1530 (0.6) 770 (0.3)    Drains 520 (0.2) 200 (0.1)    Blood 250 (0.1)     Total Output 2300 970    Net +276.2 +1483.8          Stool Occurrence 0 x 0 x          Physical Exam   Constitutional: He is oriented to person, place, and time. He appears well-developed and well-nourished. No distress.   HENT:   Head: Normocephalic and atraumatic.   Cardiovascular: Normal rate and regular rhythm.    Pulmonary/Chest: Effort normal. No respiratory distress.   Abdominal:   Soft, appropriate TTP  Incision - clean, dry and intact  G-tube to gravity   Neurological: He is alert and oriented to person, place, and time.   Skin: Skin is warm and dry.   Psychiatric: He has a normal mood and affect. His behavior is normal.       Significant Labs:  CBC:   Recent Labs  Lab 05/23/18  0430   WBC 10.11   RBC 3.00*   HGB 8.3*   HCT 26.7*      MCV 89   MCH 27.7   MCHC 31.1*     BMP:   Recent Labs  Lab 05/23/18  0430         K 3.9      CO2 21*   BUN 26*   CREATININE 2.3*    CALCIUM 9.4   MG 2.1     CMP:   Recent Labs  Lab 05/23/18  0430      CALCIUM 9.4   ALBUMIN 1.8*   PROT 5.8*      K 3.9   CO2 21*      BUN 26*   CREATININE 2.3*   ALKPHOS 132   ALT <5*   AST 9*   BILITOT 1.4*     LFTs:   Recent Labs  Lab 05/23/18  0430   ALT <5*   AST 9*   ALKPHOS 132   BILITOT 1.4*   PROT 5.8*   ALBUMIN 1.8*     Coagulation:   Recent Labs  Lab 05/21/18  1327   LABPROT 11.5   INR 1.1   APTT 23.3     Assessment/Plan:     * Adrenal tumor    Patient is 83 yo M who presents with large necrotic left adrenal mass, s/p adrenalectomy 5/21/18    -NPO  -IVF NS @ 50  -g-tube to gravity  -pain/nausea meds PRN  -bowel regimen - miralax  -PT/OT          Acute respiratory insufficiency, postoperative    Aggressive pulmonary toilet  -IS, acapella, CPT q4  -duonebs        Acute blood loss anemia    Received 1prbc 5/22/18  Continue to monitor        Hypomagnesemia    Replace         Dementia    Home meds  -memantine        Urinary retention    Home meds  -dutasteride  -calabrese        Non-ischemic cardiomyopathy    Home meds  -amlodipine, clonidine, carvedilol, bidil          Type 2 diabetes mellitus with diabetic peripheral angiopathy without gangrene, without long-term current use of insulin    SSI, accuchecks        Microcytic anemia    Iron supplement        Chronic atrial fibrillation    Holding anticoagulation given increased bleeding risk            Lexi Tolentino PA-C   [id number]  General Surgery  [facility]

## 2018-05-23 NOTE — PLAN OF CARE
Problem: Physical Therapy Goal  Goal: Physical Therapy Goal  Goals to be met by: 18     Patient will increase functional independence with mobility by performin. Supine to sit with Stand-by Assistance  2. Sit to supine with Stand-by Assistance  3. Sit to stand transfer with Stand-by Assistance  4. Gait  x >200 feet with Stand-by Assistance using Rolling Walker or LRAD  5. Lower extremity exercise program x20 reps per handout, with supervision     Outcome: Ongoing (interventions implemented as appropriate)  PT re-evaluation complete. Recommending SNF. Please see full PT re-evaluation note for details.     Jenna Crump PT, DPT  2018  [phone]

## 2018-05-23 NOTE — PT/OT/SLP RE-EVAL
"Physical Therapy Re-evaluation    Patient Name:  Jose Cuadra   MRN:  [id number]    Recommendations:     Discharge Recommendations:  nursing facility, skilled   Discharge Equipment Recommendations: none   Barriers to discharge: Decreased caregiver support    Assessment:     Jose Cuadra is a 82 y.o. male admitted with a medical diagnosis of Adrenal tumor.  He presents with the following impairments/functional limitations:  weakness, impaired endurance, gait instability, impaired cognition, decreased lower extremity function, impaired cardiopulmonary response to activity, pain, decreased upper extremity function, impaired functional mobilty, impaired self care skills, visual deficits, impaired balance, decreased safety awareness, impaired skin. Patient demonstrated fair participation despite pain and impaired cognition. Level of assistance required mod A - min A. Able to tolerate gait training x 10 feet in room. Recommending SNF. Skilled physical therapy is medically necessary to address the above impairments in order to return the patient back to their previous level of function.       Rehab Prognosis:  fair; patient would benefit from acute skilled PT services to address these deficits and reach maximum level of function.      Recent Surgery: Procedure(s) (LRB):  ADRENALECTOMY (Left)  INSERTION-TUBE-GASTROSTOMY (N/A) 2 Days Post-Op    Plan:     During this hospitalization, patient to be seen 4 x/week to address the above listed problems via gait training, therapeutic activities, therapeutic exercises, neuromuscular re-education  · Plan of Care Expires:  06/18/18   Plan of Care Reviewed with: patient    Subjective     Communicated with nurse Corcoran prior to session.  Patient found with HOB elevated upon PT entry to room, agreeable to evaluation.      Chief Complaint: pain; patient reported, "Put me in from of firing squad, I feel like I've been shot out of a [place]rosc[place], I'll be dead tomorrow"   Patient comments/goals: " "to go to SNF and receive more therapy services   Pain/Comfort:  · Pain Rating 1:  (patient did not rate, but stated "I feel like I've been shot out of a [place]")  · Location 1: abdomen  · Pain Addressed 1: Reposition, Distraction, Cessation of Activity    Patients cultural, spiritual, Methodist conflicts given the current situation: none stated      Objective:     Patient found with: peripheral IV, telemetry, SCD, pulse ox (continuous), oxygen, PICC line     General Precautions: Standard, fall, hearing impaired, vision impaired   Orthopedic Precautions:N/A   Braces: N/A     Exams:  · Cognitive Exam: patient is not oriented   · Postural Exam:  Patient presented with the following abnormalities:    · -       Rounded shoulders  · -       Forward head  · -       Kyphosis  · RUE ROM: WFL  · RUE Strength: WFL  · LUE ROM: WFL  · LUE Strength: WFL  · RLE ROM: WFL  · RLE Strength: WFL  · LLE ROM: WFL  · LLE Strength: WFL   · Balance: fair+    Functional Mobility:  · Bed Mobility:   Rolling to the left: Moderate Assistance x 2 people   Supine to Sit: Moderate Assistance x 2 people    · Transfers:   Sit to Stand: Moderate Assistance with Rolling Walker x 2 people x 2 trials   Stand to Sit: Moderate Assistance with Rolling Walker x 2 people x 2 trials   Bed to Chair: Stand Pivot with Moderate Assistance with Rolling Walker x 1 trial    · Gait:   Distance Ambulated: Pt ambulated x12 feet in room. Pt demo increased flexion and decreased step length, and req VC/TCs for RW step sequencing.   Assistance level: Moderate Assistance   Assistive Device used:  Rolling Walker   Gait Pattern: 4-point gait   Gait Deviation(s): decreased nael, increased time in double stance and decreased weight-shifting ability    AM-PAC 6 CLICK MOBILITY  Total Score:16       Therapeutic Activities and Exercises:  PT arrived to patient's room to find patient resting quietly; agreeable to PT session. Patient performed mobility as above with non-skid " socks in place.    · Patient Education:   · PT POC  · Importance of OOB activity UIC  · Gait training with RW as AD  Bedside table in front of patient and area set up for function, convenience, and safety. RN aware of patient's mobility needs and status. Questions/concerns addressed within PT scope of practice; patient with no further questions.       Patient left up in chair with all lines intact, call button in reach and Jewell  present.    GOALS:    Physical Therapy Goals        Problem: Physical Therapy Goal    Goal Priority Disciplines Outcome Goal Variances Interventions   Physical Therapy Goal     PT/OT, PT Ongoing (interventions implemented as appropriate)     Description:  Goals to be met by: 18     Patient will increase functional independence with mobility by performin. Supine to sit with Stand-by Assistance  2. Sit to supine with Stand-by Assistance  3. Sit to stand transfer with Stand-by Assistance  4. Gait  x >200 feet with Stand-by Assistance using Rolling Walker or LRAD  5. Lower extremity exercise program x20 reps per handout, with supervision                       History:     Past Medical History:   Diagnosis Date    Acute on chronic congestive heart failure     Anemia     receiving blood today 2017    Blindness, right eye, normal vision left eye     Cancer     kidney cancer     Chronic atrial fibrillation 3/21/2018    Coronary artery disease     pacemaker     Dementia 5/15/2018    Diabetes mellitus, type 2     Disorder of kidney and ureter     right kidney removed    Encounter for blood transfusion     GERD (gastroesophageal reflux disease)     sometimes heartburn    Hyperlipidemia     Hypertension        Past Surgical History:   Procedure Laterality Date    CARDIAC PACEMAKER PLACEMENT      CARDIAC PACEMAKER PLACEMENT Left 2014    COLON SURGERY      COLONOSCOPY N/A 3/26/2018    Procedure: COLONOSCOPY;  Surgeon: Jonas Belle MD;  Location: [facility];  Service: Endoscopy;  Laterality: N/A;    KIDNEY SURGERY Right     for cancer       Clinical Decision Making:     History  Co-morbidities and personal factors that may impact the plan of care Examination  Body Structures and Functions, activity limitations and participation restrictions that may impact the plan of care Clinical Presentation   Decision Making/ Complexity Score   Co-morbidities:   [] Time since onset of injury / illness / exacerbation  [] Status of current condition  []Patient's cognitive status and safety concerns    [] Multiple Medical Problems (see med hx)  Personal Factors:   [] Patient's age  [] Prior Level of function   [] Patient's home situation (environment and family support)  [] Patient's level of motivation  [] Expected progression of patient      HISTORY:(criteria)    [] 64554 - no personal factors/history    [] 70019 - has 1-2 personal factor/comorbidity     [] 98242 - has >3 personal factor/comorbidity     Body Regions:  [] Objective examination findings  [] Head     []  Neck  [] Trunk   [] Upper Extremity  [] Lower Extremity    Body Systems:  [] For communication ability, affect, cognition, language, and learning style: the assessment of the ability to make needs known, consciousness, orientation (person, place, and time), expected emotional /behavioral responses, and learning preferences (eg, learning barriers, education  needs)  [] For the neuromuscular system: a general assessment of gross coordinated movement (eg, balance, gait, locomotion, transfers, and transitions) and motor function  (motor control and motor learning)  [] For the musculoskeletal system: the assessment of gross symmetry, gross range of motion, gross strength, height, and weight  [] For the integumentary system: the assessment of pliability(texture), presence of scar formation, skin color, and skin integrity  [] For cardiovascular/pulmonary system: the assessment of heart rate, respiratory rate, blood  pressure, and edema     Activity limitations:    [] Patient's cognitive status and saf ety concerns          [] Status of current condition      [] Weight bearing restriction  [] Cardiopulmunary Restriction    Participation Restrictions:   [] Goals and goal agreement with the patient     [] Rehab potential (prognosis) and probable outcome      Examination of Body System: (criteria)    [] 42096 - addressing 1-2 elements    [] 20399 - addressing a total of 3 or more elements     [] 85772 -  Addressing a total of 4 or more elements         Clinical Presentation: (criteria)  Choose one     On examination of body system using standardized tests and measures patient presents with (CHOOSE ONE) elements from any of the following: body structures and functions, activity limitations, and/or participation restrictions.  Leading to a clinical presentation that is considered (CHOOSE ONE)                              Clinical Decision Making  (Eval Complexity):  Choose One     Time Tracking:     PT Received On: 05/23/18  PT Start Time: 0837     PT Stop Time: 0906  PT Total Time (min): 29 min     Billable Minutes: Re-eval 10 and Gait Training 19      Jenna Crump, PT  05/23/2018

## 2018-05-23 NOTE — SUBJECTIVE & OBJECTIVE
Interval History:   Patient seen and examined, no acute events overnight  Denies N/V, reports abdominal pain and shortness of breath  Satting mid 90's on 2LNC  Denies F/no BM  g-tube to gravity, put out 200mL  Afebrile/VSS    Medications:  Continuous Infusions:   sodium chloride 0.9% 50 mL/hr at 05/22/18 2154     Scheduled Meds:   albuterol-ipratropium  3 mL Nebulization Q6H WAKE    carvedilol  25 mg Oral BID WM    cloNIDine  0.3 mg Oral TID    dutasteride  0.5 mg Oral Daily    ferrous sulfate  325 mg Oral BID    guaiFENesin  600 mg Oral BID    isosorbide-hydrALAZINE 20-37.5 mg  2 tablet Oral TID    memantine  10 mg Oral BID    pantoprazole  40 mg Oral Daily    sodium bicarbonate  1 tablet Oral TID     PRN Meds:sodium chloride, dextrose 50%, dextrose 50%, glucagon (human recombinant), glucose, glucose, HYDROmorphone, HYDROmorphone, insulin aspart U-100, labetalol, naloxone, ondansetron, promethazine (PHENERGAN) IVPB, sodium chloride 0.9%     Review of patient's allergies indicates:  No Known Allergies  Objective:     Vital Signs (Most Recent):  Temp: 98.3 °F (36.8 °C) (05/23/18 0432)  Pulse: 68 (05/23/18 0432)  Resp: 20 (05/23/18 0432)  BP: (!) 152/67 (05/23/18 0432)  SpO2: 97 % (05/23/18 0432) Vital Signs (24h Range):  Temp:  [97.8 °F (36.6 °C)-98.7 °F (37.1 °C)] 98.3 °F (36.8 °C)  Pulse:  [68-97] 68  Resp:  [12-24] 20  SpO2:  [93 %-100 %] 97 %  BP: ()/(50-67) 152/67     Weight: 106.5 kg (234 lb 13 oz)  Body mass index is 29.35 kg/m².    Intake/Output - Last 3 Shifts       05/21 0700 - 05/22 0659 05/22 0700 - 05/23 0659    P.O. 40 250    I.V. (mL/kg) 2336.2 (21.9) 810 (7.6)    Blood  293.8    IV Piggyback 200 1100    Total Intake(mL/kg) 2576.2 (24.2) 2453.8 (23)    Urine (mL/kg/hr) 1530 (0.6) 770 (0.3)    Drains 520 (0.2) 200 (0.1)    Blood 250 (0.1)     Total Output 2300 970    Net +276.2 +1483.8          Stool Occurrence 0 x 0 x          Physical Exam   Constitutional: He is oriented to person,  place, and time. He appears well-developed and well-nourished. No distress.   HENT:   Head: Normocephalic and atraumatic.   Cardiovascular: Normal rate and regular rhythm.    Pulmonary/Chest: Effort normal. No respiratory distress.   Abdominal:   Soft, appropriate TTP  Incision - clean, dry and intact  G-tube to gravity   Neurological: He is alert and oriented to person, place, and time.   Skin: Skin is warm and dry.   Psychiatric: He has a normal mood and affect. His behavior is normal.       Significant Labs:  CBC:   Recent Labs  Lab 05/23/18  0430   WBC 10.11   RBC 3.00*   HGB 8.3*   HCT 26.7*      MCV 89   MCH 27.7   MCHC 31.1*     BMP:   Recent Labs  Lab 05/23/18  0430         K 3.9      CO2 21*   BUN 26*   CREATININE 2.3*   CALCIUM 9.4   MG 2.1     CMP:   Recent Labs  Lab 05/23/18  0430      CALCIUM 9.4   ALBUMIN 1.8*   PROT 5.8*      K 3.9   CO2 21*      BUN 26*   CREATININE 2.3*   ALKPHOS 132   ALT <5*   AST 9*   BILITOT 1.4*     LFTs:   Recent Labs  Lab 05/23/18  0430   ALT <5*   AST 9*   ALKPHOS 132   BILITOT 1.4*   PROT 5.8*   ALBUMIN 1.8*     Coagulation:   Recent Labs  Lab 05/21/18  1327   LABPROT 11.5   INR 1.1   APTT 23.3

## 2018-05-23 NOTE — PT/OT/SLP DISCHARGE
Occupational Therapy Discharge Summary    Jose Cuadra  MRN: [id number]   Principal Problem: Adrenal tumor      Patient Discharged from acute Occupational Therapy on 05/21/2018.  Please refer to prior OT note dated 05/20/2018 for functional status.    Assessment:      Patient has not met goals.    Objective:     GOALS:    Occupational Therapy Goals        Problem: Occupational Therapy Goal    Goal Priority Disciplines Outcome Interventions   Occupational Therapy Goal     OT, PT/OT Ongoing (interventions implemented as appropriate)    Description:  Goals to be met by: 06/10/2018     Patient will increase functional independence with ADLs by performing:    UE Dressing with Supervision.  LE Dressing with Stand-by Assistance.  Grooming while standing with Stand-by Assistance.  Toileting from bedside commode with Stand-by Assistance for hygiene and clothing management.   Toilet transfer to bedside commode with Stand-by Assistance.  Upper extremity exercise program x20 reps per handout, with supervision and assistance as needed.                      Reasons for Discontinuation of Therapy Services  Surgery    Plan:     Patient Discharged to: OR/PACU. Will need new OT eval and treat orders to resume therapy services.     Tereso Cardoso, OT  5/23/2018

## 2018-05-23 NOTE — PLAN OF CARE
Problem: Occupational Therapy Goal  Goal: Occupational Therapy Goal  Goals to be met by: 06/10/2018     Patient will increase functional independence with ADLs by performing:    UE Dressing with Supervision.  LE Dressing with Stand-by Assistance.  Grooming while standing with Stand-by Assistance.  Toileting from bedside commode with Stand-by Assistance for hygiene and clothing management.   Toilet transfer to bedside commode with Stand-by Assistance.  Upper extremity exercise program x20 reps per handout, with supervision and assistance as needed.     Outcome: Ongoing (interventions implemented as appropriate)  Re-eval completed. POC implemented and goals re-established. Pt progressing towards all goals at this time. All goals remain appropriate. Revise goals as needed.    Tereso Cardoso, OT  5/23/2018

## 2018-05-23 NOTE — PT/OT/SLP RE-EVAL
Occupational Therapy   Re-evaluation    Name: Jose Cuadra  MRN: [id number]  Admitting Diagnosis:  Adrenal tumor 2 Days Post-Op    Recommendations:     Discharge Recommendations: nursing facility, skilled  Discharge Equipment Recommendations:  none  Barriers to discharge:  Decreased caregiver support, Inaccessible home environment    History:     Past Medical History:   Diagnosis Date    Acute on chronic congestive heart failure     Anemia     receiving blood today 03/24/2017    Blindness, right eye, normal vision left eye     Cancer     kidney cancer 2007    Chronic atrial fibrillation 3/21/2018    Coronary artery disease     pacemaker 2014    Dementia 5/15/2018    Diabetes mellitus, type 2     Disorder of kidney and ureter     right kidney removed    Encounter for blood transfusion     GERD (gastroesophageal reflux disease)     sometimes heartburn    Hyperlipidemia     Hypertension        Past Surgical History:   Procedure Laterality Date    CARDIAC PACEMAKER PLACEMENT      CARDIAC PACEMAKER PLACEMENT Left 12/09/2014    COLON SURGERY      COLONOSCOPY N/A 3/26/2018    Procedure: COLONOSCOPY;  Surgeon: Jonas Belle MD;  Location: [facility];  Service: Endoscopy;  Laterality: N/A;    KIDNEY SURGERY Right     for cancer       Subjective     Chief Complaint: pain in abdomen   Patient/Family stated goals: To go to SNF and receive further therapy services to become stronger.   Communicated with: RN prior to session. Pt agreeable to participate in OT re-evaluation.   Pain/Comfort:  · Pain Rating 1:  (Pt did not rate)  · Location 1: abdomen    Objective:     Patient found with: peripheral IV, telemetry, SCD, pulse ox (continuous), PICC line    General Precautions: Standard, fall, hearing impaired, vision impaired   Orthopedic Precautions:N/A   Braces: N/A     Occupational Performance:    Bed Mobility:    · Patient completed Rolling/Turning to Left with  moderate assistance  · Patient completed Supine  to Sit with moderate assistance and 2 persons    Functional Mobility/Transfers:  · Patient completed Sit <> Stand Transfer with moderate assistance and of 2 persons  with  rolling walker   · Patient completed Bed <> Chair Transfer using Stand Pivot technique with moderate assistance and of 2 persons with rolling walker  · Pt performed functional mobility for household distances with min A x 2 using RW with min rest breaks secondary to noted fatigue. Pt required mod VC for safety and for RW management     Activities of Daily Living:  · UB Dressing: contact guard assistance for donning gown like a jacket sitting EOB for line managment   · LB Dressing: moderate assistance to don/doff socks while sitting EOB  · Toileting: total assistance for clothing management and proper hygiene     Cognitive/Visual Perceptual:  Cognitive/Psychosocial Skills:     -       Oriented to: Person   -       Follows Commands/attention:Inattentive and Easily distracted  -       Safety awareness/insight to disability: impaired   Visual/Perceptual:      -Pt wears glasses at times     Physical Exam:  Balance:    -       fair standing  Postural examination/scapula alignment:    -       Rounded shoulders  -       Forward head  -       Kyphosis  Skin integrity: Visible skin intact  Edema:  None noted  Sensation:    -       Intact  Dominant hand:    -       Right  Upper Extremity Range of Motion:     -       Right Upper Extremity: WFL  -       Left Upper Extremity: WFL  Upper Extremity Strength:    -       Right Upper Extremity: WFL  -       Left Upper Extremity: WFL   Strength:    -       Right Upper Extremity: WFL  -       Left Upper Extremity: WFL    Patient left up in chair with all lines intact, call button in reach, chair alarm on and RN present    [facility] 6 Click:  [facility] Total Score: 17    Treatment & Education:  · Pt educated on safety awareness with functional transfers and with completion of ADLS and OT POC  · Pt educated on role of OT  "and purpose of evaluation and goals for therapy  · Pt completed ADLS and functional mobility for treatment session as noted above  · Pt educated on importance of completing OOB with nursing staff assistance to increase pt functional activity tolerance and for the prevention of secondary complications following surgery  · Pt educated on line management in various set-ups (sitting in recliner, supine in bed, functional mobility tasks)  · White board/Communication board updated       Education:    Assessment:     Jose Cuadra is a 82 y.o. male with a medical diagnosis of Adrenal tumor.  He presents with performance deficits affecting function including weakness, impaired endurance, impaired self care skills, impaired balance, impaired functional mobilty, gait instability, impaired cardiopulmonary response to activity, decreased lower extremity function, visual deficits, pain.  Pt tolerated treatment well, despite reported pain and noted increased confusion. Pt requires total to mod A for completion of LB ADLs and mod A x 2 for functional transfers/bed mobility secondary to generalized weakness and increased confusion. Pt will continue to benefit from skilled OT services to address the above listed impairments, decrease caregiver burden, and increase pt functional independence level. Recommend SNF placement upon discharge.     Rehab Prognosis:  Fair; patient would benefit from acute skilled OT services to address these deficits and reach maximum level of function.         Clinical Decision Makin.  OT Mod:  "Pt evaluation falls under moderate complexity for evaluation coding due to identification of 3-5 performance deficits noted as stated above. Eval required Min/Mod assistance to complete on this date and detailed assessment(s) were utilized. Moreover, an expanded review of history and occupational profile obtained with additional review of cognitive, physical and psychosocial hx."     Plan:     Patient to be " seen 4 x/week to address the above listed problems via self-care/home management, therapeutic activities, therapeutic exercises, neuromuscular re-education  · Plan of Care Expires: 06/20/18  · Plan of Care Reviewed with: patient    This Plan of care has been discussed with the patient who was involved in its development and understands and is in agreement with the identified goals and treatment plan    GOALS:    Occupational Therapy Goals        Problem: Occupational Therapy Goal    Goal Priority Disciplines Outcome Interventions   Occupational Therapy Goal     OT, PT/OT Ongoing (interventions implemented as appropriate)    Description:  Goals to be met by: 06/10/2018     Patient will increase functional independence with ADLs by performing:    UE Dressing with Supervision.  LE Dressing with Stand-by Assistance.  Grooming while standing with Stand-by Assistance.  Toileting from bedside commode with Stand-by Assistance for hygiene and clothing management.   Toilet transfer to bedside commode with Stand-by Assistance.  Upper extremity exercise program x20 reps per handout, with supervision and assistance as needed.                      Time Tracking:     OT Date of Treatment: 05/23/18  OT Start Time: 0837  OT Stop Time: 0906  OT Total Time (min): 29 min    Billable Minutes:Remark 20  Self Care/Home Management 9    Tereso Cardoso OT  5/23/2018

## 2018-05-24 LAB
ALBUMIN SERPL BCP-MCNC: 1.7 G/DL
ALP SERPL-CCNC: 123 U/L
ALT SERPL W/O P-5'-P-CCNC: <5 U/L
ANION GAP SERPL CALC-SCNC: 8 MMOL/L
AST SERPL-CCNC: 9 U/L
BASOPHILS # BLD AUTO: 0.05 K/UL
BASOPHILS # BLD AUTO: 0.06 K/UL
BASOPHILS NFR BLD: 0.5 %
BASOPHILS NFR BLD: 0.5 %
BILIRUB SERPL-MCNC: 1.2 MG/DL
BUN SERPL-MCNC: 34 MG/DL
CALCIUM SERPL-MCNC: 9.5 MG/DL
CHLORIDE SERPL-SCNC: 111 MMOL/L
CO2 SERPL-SCNC: 23 MMOL/L
CREAT SERPL-MCNC: 2.5 MG/DL
DIFFERENTIAL METHOD: ABNORMAL
DIFFERENTIAL METHOD: ABNORMAL
EOSINOPHIL # BLD AUTO: 0.3 K/UL
EOSINOPHIL # BLD AUTO: 0.3 K/UL
EOSINOPHIL NFR BLD: 2.4 %
EOSINOPHIL NFR BLD: 2.7 %
ERYTHROCYTE [DISTWIDTH] IN BLOOD BY AUTOMATED COUNT: 18.9 %
ERYTHROCYTE [DISTWIDTH] IN BLOOD BY AUTOMATED COUNT: 19 %
EST. GFR  (AFRICAN AMERICAN): 27 ML/MIN/1.73 M^2
EST. GFR  (NON AFRICAN AMERICAN): 23.4 ML/MIN/1.73 M^2
GLUCOSE SERPL-MCNC: 109 MG/DL
HCT VFR BLD AUTO: 24.2 %
HCT VFR BLD AUTO: 24.5 %
HGB BLD-MCNC: 7.3 G/DL
HGB BLD-MCNC: 7.4 G/DL
IMM GRANULOCYTES # BLD AUTO: 0.05 K/UL
IMM GRANULOCYTES # BLD AUTO: 0.11 K/UL
IMM GRANULOCYTES NFR BLD AUTO: 0.5 %
IMM GRANULOCYTES NFR BLD AUTO: 1 %
LYMPHOCYTES # BLD AUTO: 0.7 K/UL
LYMPHOCYTES # BLD AUTO: 0.8 K/UL
LYMPHOCYTES NFR BLD: 6.2 %
LYMPHOCYTES NFR BLD: 7.7 %
MAGNESIUM SERPL-MCNC: 2 MG/DL
MCH RBC QN AUTO: 26.8 PG
MCH RBC QN AUTO: 27.4 PG
MCHC RBC AUTO-ENTMCNC: 29.8 G/DL
MCHC RBC AUTO-ENTMCNC: 30.6 G/DL
MCV RBC AUTO: 90 FL
MCV RBC AUTO: 90 FL
MONOCYTES # BLD AUTO: 0.9 K/UL
MONOCYTES # BLD AUTO: 1 K/UL
MONOCYTES NFR BLD: 7.7 %
MONOCYTES NFR BLD: 9.4 %
NEUTROPHILS # BLD AUTO: 8.3 K/UL
NEUTROPHILS # BLD AUTO: 9 K/UL
NEUTROPHILS NFR BLD: 79.5 %
NEUTROPHILS NFR BLD: 81.9 %
NRBC BLD-RTO: 0 /100 WBC
NRBC BLD-RTO: 0 /100 WBC
PHOSPHATE SERPL-MCNC: 3.8 MG/DL
PLATELET # BLD AUTO: 240 K/UL
PLATELET # BLD AUTO: 249 K/UL
PMV BLD AUTO: 11.8 FL
PMV BLD AUTO: 12.5 FL
POCT GLUCOSE: 108 MG/DL (ref 70–110)
POCT GLUCOSE: 115 MG/DL (ref 70–110)
POCT GLUCOSE: 98 MG/DL (ref 70–110)
POTASSIUM SERPL-SCNC: 3.7 MMOL/L
PROT SERPL-MCNC: 5.9 G/DL
RBC # BLD AUTO: 2.7 M/UL
RBC # BLD AUTO: 2.72 M/UL
SODIUM SERPL-SCNC: 142 MMOL/L
WBC # BLD AUTO: 10.44 K/UL
WBC # BLD AUTO: 11.04 K/UL

## 2018-05-24 PROCEDURE — 99900035 HC TECH TIME PER 15 MIN (STAT)

## 2018-05-24 PROCEDURE — 25000003 PHARM REV CODE 250: Performed by: STUDENT IN AN ORGANIZED HEALTH CARE EDUCATION/TRAINING PROGRAM

## 2018-05-24 PROCEDURE — 84100 ASSAY OF PHOSPHORUS: CPT

## 2018-05-24 PROCEDURE — 20600001 HC STEP DOWN PRIVATE ROOM

## 2018-05-24 PROCEDURE — 94640 AIRWAY INHALATION TREATMENT: CPT

## 2018-05-24 PROCEDURE — 25000242 PHARM REV CODE 250 ALT 637 W/ HCPCS: Performed by: STUDENT IN AN ORGANIZED HEALTH CARE EDUCATION/TRAINING PROGRAM

## 2018-05-24 PROCEDURE — 83735 ASSAY OF MAGNESIUM: CPT

## 2018-05-24 PROCEDURE — 94664 DEMO&/EVAL PT USE INHALER: CPT

## 2018-05-24 PROCEDURE — 94761 N-INVAS EAR/PLS OXIMETRY MLT: CPT

## 2018-05-24 PROCEDURE — 27000221 HC OXYGEN, UP TO 24 HOURS

## 2018-05-24 PROCEDURE — 25000003 PHARM REV CODE 250: Performed by: SURGERY

## 2018-05-24 PROCEDURE — 36415 COLL VENOUS BLD VENIPUNCTURE: CPT

## 2018-05-24 PROCEDURE — 80053 COMPREHEN METABOLIC PANEL: CPT

## 2018-05-24 PROCEDURE — 63600175 PHARM REV CODE 636 W HCPCS: Performed by: STUDENT IN AN ORGANIZED HEALTH CARE EDUCATION/TRAINING PROGRAM

## 2018-05-24 PROCEDURE — 27000646 HC AEROBIKA DEVICE

## 2018-05-24 PROCEDURE — 85025 COMPLETE CBC W/AUTO DIFF WBC: CPT

## 2018-05-24 RX ORDER — IPRATROPIUM BROMIDE AND ALBUTEROL SULFATE 2.5; .5 MG/3ML; MG/3ML
3 SOLUTION RESPIRATORY (INHALATION)
Status: DISCONTINUED | OUTPATIENT
Start: 2018-05-24 | End: 2018-05-28 | Stop reason: HOSPADM

## 2018-05-24 RX ADMIN — IPRATROPIUM BROMIDE AND ALBUTEROL SULFATE 3 ML: 2.5; .5 SOLUTION RESPIRATORY (INHALATION) at 01:05

## 2018-05-24 RX ADMIN — GUAIFENESIN 600 MG: 600 TABLET, EXTENDED RELEASE ORAL at 08:05

## 2018-05-24 RX ADMIN — HYDROMORPHONE HYDROCHLORIDE 0.5 MG: 1 INJECTION, SOLUTION INTRAMUSCULAR; INTRAVENOUS; SUBCUTANEOUS at 04:05

## 2018-05-24 RX ADMIN — SODIUM BICARBONATE 650 MG TABLET 650 MG: at 08:05

## 2018-05-24 RX ADMIN — CLONIDINE HYDROCHLORIDE 0.3 MG: 0.1 TABLET ORAL at 02:05

## 2018-05-24 RX ADMIN — HYDRALAZINE HYDROCHLORIDE AND ISOSORBIDE DINITRATE 2 TABLET: 37.5; 2 TABLET, FILM COATED ORAL at 08:05

## 2018-05-24 RX ADMIN — MEMANTINE HYDROCHLORIDE 10 MG: 5 TABLET ORAL at 08:05

## 2018-05-24 RX ADMIN — SODIUM CHLORIDE: 0.9 INJECTION, SOLUTION INTRAVENOUS at 10:05

## 2018-05-24 RX ADMIN — CARVEDILOL 25 MG: 25 TABLET, FILM COATED ORAL at 08:05

## 2018-05-24 RX ADMIN — CLONIDINE HYDROCHLORIDE 0.3 MG: 0.1 TABLET ORAL at 08:05

## 2018-05-24 RX ADMIN — CARVEDILOL 25 MG: 25 TABLET, FILM COATED ORAL at 04:05

## 2018-05-24 RX ADMIN — DUTASTERIDE 0.5 MG: 0.5 CAPSULE, LIQUID FILLED ORAL at 08:05

## 2018-05-24 RX ADMIN — IPRATROPIUM BROMIDE AND ALBUTEROL SULFATE 3 ML: 2.5; .5 SOLUTION RESPIRATORY (INHALATION) at 05:05

## 2018-05-24 RX ADMIN — IPRATROPIUM BROMIDE AND ALBUTEROL SULFATE 3 ML: .5; 3 SOLUTION RESPIRATORY (INHALATION) at 07:05

## 2018-05-24 RX ADMIN — HYDROMORPHONE HYDROCHLORIDE 0.5 MG: 1 INJECTION, SOLUTION INTRAMUSCULAR; INTRAVENOUS; SUBCUTANEOUS at 10:05

## 2018-05-24 RX ADMIN — HYDRALAZINE HYDROCHLORIDE AND ISOSORBIDE DINITRATE 2 TABLET: 37.5; 2 TABLET, FILM COATED ORAL at 02:05

## 2018-05-24 RX ADMIN — FERROUS SULFATE TAB EC 325 MG (65 MG FE EQUIVALENT) 325 MG: 325 (65 FE) TABLET DELAYED RESPONSE at 08:05

## 2018-05-24 RX ADMIN — PANTOPRAZOLE SODIUM 40 MG: 40 TABLET, DELAYED RELEASE ORAL at 08:05

## 2018-05-24 RX ADMIN — ONDANSETRON 8 MG: 8 TABLET, ORALLY DISINTEGRATING ORAL at 08:05

## 2018-05-24 RX ADMIN — SODIUM BICARBONATE 650 MG TABLET 650 MG: at 02:05

## 2018-05-24 NOTE — PLAN OF CARE
Problem: Patient Care Overview  Goal: Plan of Care Review  Outcome: Ongoing (interventions implemented as appropriate)  Pt AAOx4, forget at times. Pt NPO except sips with meds.Pt denied pain during shift. Pt on tele, NSR, all other VSS on 2L NC. SCDs on. Carney catheter intact and patent. G tube to gravity. Pt slept between care. Call light in reach and bed in lowest position. Bed alarm set. Pt remains free of falls and injury. No acute events this shift. Will continue to monitor.

## 2018-05-24 NOTE — ASSESSMENT & PLAN NOTE
Contributing Nutrition Diagnosis  Inadequate Energy Intake    Related to (etiology):   S/p adrenalectomy     Signs and Symptoms (as evidenced by):   Pt NPO and waiting for bowel function to return     Interventions/Recommendations (treatment strategy):  See recs    Nutrition Diagnosis Status:   New

## 2018-05-24 NOTE — PLAN OF CARE
VINICIUS spoke to Mary at [facility] ([phone]) who had some questions regarding long term plan for Pt. She wanted to know if Pt's adrenal tumor was cancerous and whether or not Pt would need oncology follow-up, etc. VINICIUS informed Mary that Pt's pathology had not come back yet and that they expected Pt would be ready to transfer to SNF on Friday. Mary said she would need to speak to her DON and get back with VINICIUS.     Yara Ewing, PAULOW

## 2018-05-24 NOTE — SUBJECTIVE & OBJECTIVE
Interval History:   Patient seen and examined, no acute events overnight  Denies N/V, reports abdominal pain and shortness of breath  Satting mid 90's on 2LNC  Denies F/no BM  g-tube to gravity, no recorded output overnight  Afebrile/VSS    Medications:  Continuous Infusions:   sodium chloride 0.9% 50 mL/hr at 05/22/18 2154     Scheduled Meds:   albuterol-ipratropium  3 mL Nebulization Q6H WAKE    carvedilol  25 mg Oral BID WM    cloNIDine  0.3 mg Oral TID    dutasteride  0.5 mg Oral Daily    ferrous sulfate  325 mg Oral BID    guaiFENesin  600 mg Oral BID    isosorbide-hydrALAZINE 20-37.5 mg  2 tablet Oral TID    memantine  10 mg Oral BID    pantoprazole  40 mg Oral Daily    sodium bicarbonate  1 tablet Oral TID     PRN Meds:sodium chloride, dextrose 50%, dextrose 50%, glucagon (human recombinant), glucose, glucose, HYDROmorphone, HYDROmorphone, insulin aspart U-100, labetalol, naloxone, ondansetron, promethazine (PHENERGAN) IVPB, sodium chloride 0.9%     Review of patient's allergies indicates:  No Known Allergies  Objective:     Vital Signs (Most Recent):  Temp: 98 °F (36.7 °C) (05/24/18 0426)  Pulse: 69 (05/24/18 0542)  Resp: 18 (05/24/18 0542)  BP: (!) 148/62 (05/24/18 0426)  SpO2: (!) 92 % (05/24/18 0544) Vital Signs (24h Range):  Temp:  [98 °F (36.7 °C)-99.6 °F (37.6 °C)] 98 °F (36.7 °C)  Pulse:  [59-71] 69  Resp:  [16-32] 18  SpO2:  [92 %-98 %] 92 %  BP: (119-148)/(56-85) 148/62     Weight: 106.5 kg (234 lb 13 oz)  Body mass index is 29.35 kg/m².    Intake/Output - Last 3 Shifts       05/22 0700 - 05/23 0659 05/23 0700 - 05/24 0659 05/24 0700 - 05/25 0659    P.O. 250 170     I.V. (mL/kg) 1905 (17.9) 1120 (10.5)     Blood 293.8      NG/GT  30     IV Piggyback 1100      Total Intake(mL/kg) 3548.8 (33.3) 1320 (12.4)     Urine (mL/kg/hr) 770 (0.3) 950 (0.4)     Emesis/NG output  0 (0)     Drains 216 (0.1) 0 (0)     Other  0 (0)     Stool  0 (0)     Blood  0 (0)     Total Output 986 950      Net  +2562.8 +370             Urine Occurrence  0 x     Stool Occurrence 0 x 0 x     Emesis Occurrence  0 x           Physical Exam   Constitutional: He is oriented to person, place, and time. He appears well-developed and well-nourished. No distress.   HENT:   Head: Normocephalic and atraumatic.   Cardiovascular: Normal rate and regular rhythm.    Pulmonary/Chest: Effort normal. No respiratory distress.   Abdominal:   Soft, appropriate TTP  Incision - clean, dry and intact  G-tube to gravity   Neurological: He is alert and oriented to person, place, and time.   Skin: Skin is warm and dry.   Psychiatric: He has a normal mood and affect. His behavior is normal.       Significant Labs:  CBC:   Recent Labs  Lab 05/24/18  0438   WBC 10.44   RBC 2.70*   HGB 7.4*   HCT 24.2*      MCV 90   MCH 27.4   MCHC 30.6*     BMP:   Recent Labs  Lab 05/24/18  0438         K 3.7   *   CO2 23   BUN 34*   CREATININE 2.5*   CALCIUM 9.5   MG 2.0     CMP:   Recent Labs  Lab 05/24/18  0438      CALCIUM 9.5   ALBUMIN 1.7*   PROT 5.9*      K 3.7   CO2 23   *   BUN 34*   CREATININE 2.5*   ALKPHOS 123   ALT <5*   AST 9*   BILITOT 1.2*     LFTs:   Recent Labs  Lab 05/24/18  0438   ALT <5*   AST 9*   ALKPHOS 123   BILITOT 1.2*   PROT 5.9*   ALBUMIN 1.7*     Coagulation:   Recent Labs  Lab 05/21/18  1327   LABPROT 11.5   INR 1.1   APTT 23.3

## 2018-05-24 NOTE — PLAN OF CARE
[facility]     Department of Hospital Medicine     [address] [place] [address]     [phone] after hours  [phone] fax       NURSING HOME ORDERS    05/24/2018    Admit to Nursing Home:  Skilled Bed                                                 Diagnoses:  Active Hospital Problems    Diagnosis  POA    *Adrenal tumor [D49.7]  Yes    Acute blood loss anemia [D62]  No    Acute respiratory insufficiency, postoperative [J95.89]  No    Hypomagnesemia [E83.42]  No    Urinary retention [R33.9]  Yes    Dementia [F03.90]  Yes    Non-ischemic cardiomyopathy [I42.8]  Yes    Type 2 diabetes mellitus with diabetic peripheral angiopathy without gangrene, without long-term current use of insulin [E11.51]  Yes    Chronic atrial fibrillation [I48.2]  Yes    Microcytic anemia [D50.9]  Yes      Resolved Hospital Problems    Diagnosis Date Resolved POA   No resolved problems to display.       Patient is homebound due to:  Adrenal tumor    Allergies:Review of patient's allergies indicates:  No Known Allergies    Vitals:      Every shift (Skilled Nursing patients)    Diet: adult regular   Supplement:  1 can every three times a day with meals                         Type:  Ensure    Acitivities:      - Up in a chair each morning as tolerated   - Ambulate with assistance to bathroom   - Scheduled walks once each shift (every 8 hours)   - May ambulate with assistance   - May use walker or cane     LABS:  Per facility protocol    Nursing Precautions:     - Aspiration precautions:             - Total assistance with meals            -  Upright 90 degrees befor during and after meals             -  Suction at bedside          - Fall precautions per nursing home protocol   - Seizure precaution per [facility] protocol   - Decubitus precautions:        -  for positioning   - Pressure reducing foam mattress   - Turn patient every two hours. Use wedge pillows to  anchor patient    CONSULTS:      Physical Therapy to evaluate and treat     Occupational Therapy to evaluate and treat     Speech Therapy  to evaluate and treat     Nutrition to evaluate and recommend diet     Psychiatry to evaluate and follow patients for delirium    MISCELLANEOUS CARE:              PEG Care:  Clean site every 24 hours, keep to gravity     Carney Care: Empty Carney bag every shift.  Change Carney every month     Routine Skin for Bedridden Patients:  Apply moisture barrier cream to all    skin folds and wet areas in perineal area daily and after baths and                           all bowel movements.                DIABETES CARE:        Check blood sugar:       Fingerstick blood sugar AC and HS   Fingerstick blood sugar every 6 hours if unable to eat      Report CBG < 60 or > 400 to physician.                                          Insulin Sliding Scale          Glucose  Novolog Insulin Subcutaneous        0 - 60   Orange juice or glucose tablet, hold insulin      No insulin   201-250  2 units   251-300  4 units   301-350  6 units   351-400  8 units   >400   10 units then call physician      Medications: Discontinue all previous medication orders, if any. See new list below.     Jose Cuadra   Home Medication Instructions MARYCHUY:[id number]    Printed on:05/24/18 0840   Medication Information                      acetaminophen (TYLENOL) 325 MG tablet  Take 1 tablet (325 mg total) by mouth every 6 (six) hours as needed.             albuterol-ipratropium 2.5mg-0.5mg/3mL (DUO-NEB) 0.5 mg-3 mg(2.5 mg base)/3 mL nebulizer solution  Take 3 mLs by nebulization every 8 (eight) hours. Rescue             amLODIPine (NORVASC) 5 MG tablet  Take 1 tablet (5 mg total) by mouth once daily.             carvedilol (COREG) 25 MG tablet  Take 1 tablet (25 mg total) by mouth 2 (two) times daily with meals.             cloNIDine (CATAPRES) 0.3 MG tablet  Take 1 tablet (0.3 mg total) by mouth 3 (three) times  daily.             dutasteride (AVODART) 0.5 mg capsule  Take 0.5 mg by mouth once daily.             ferrous sulfate 325 (65 FE) MG EC tablet  Take 1 tablet (325 mg total) by mouth 2 (two) times daily.                        isosorbide-hydrALAZINE 20-37.5 mg (BIDIL) 20-37.5 mg Tab  Take 2 tablets by mouth 3 (three) times daily.             memantine (NAMENDA) 10 MG Tab  Take 10 mg by mouth 2 (two) times daily.              nut.tx.imp.renal fxn,lac-reduc (SUPLENA CARB STEADY) 0.04 gram-1.8 kcal/mL Liqd  1 can tid             olmesartan (BENICAR) 20 MG tablet  Take 1 tablet (20 mg total) by mouth once daily.                        pantoprazole (PROTONIX) 40 MG tablet  Take 1 tablet (40 mg total) by mouth once daily.             polyethylene glycol (GLYCOLAX) 17 gram PwPk  Take 17 g by mouth once daily.             sodium bicarbonate 650 MG tablet  Take 1 tablet (650 mg total) by mouth 3 (three) times daily.                       _________________________________  Lexi Tolentino PA-C  05/24/2018

## 2018-05-24 NOTE — ASSESSMENT & PLAN NOTE
Patient is 81 yo M who presents with large necrotic left adrenal mass, s/p adrenalectomy 5/21/18    -NPO  -IVF NS @ 50  -start g-tube clamping trials  -pain/nausea meds PRN  -GI prophylaxis  -PT/OT

## 2018-05-24 NOTE — PROGRESS NOTES
"[facility]  General Surgery  Progress Note    Subjective:     History of Present Illness:  Patient is an 81 yo M with history of dementia, RCC, possible colon CA, CKD 4, Afib with pacemaker, CHF with EF 36%, and a large abdominal (likely adrenal) mass who presented to OSH 5/2/18 with pneumonia and during his admission was found to have a large adrenal mass that was enlarged from previous CT scan, and he was transferred here for further workup. A CT scan from 2016 showed a similar sized mass. He underwent biopsy which showed no obvious malignancy, mostly necrotic tissue. He has completed his course of abx for pneumonia and his SOA has resolved.    When asked, he states the mass has been present for years. He denies any nausea, vomiting, weight loss, fevers, or chills. He continues to have bowel movements, with bowel incontinence (he says that's his baseline). He has been off anticoagulation despite being in afib. He has been having issues urinating.    He has undergone right nephrectomy and colon resection of 22 cm "maybe for cancer".    Post-Op Info:  Procedure(s) (LRB):  ADRENALECTOMY (Left)  INSERTION-TUBE-GASTROSTOMY (N/A)   3 Days Post-Op     Interval History:   Patient seen and examined, no acute events overnight  Denies N/V, reports abdominal pain and shortness of breath  Satting mid 90's on 2LNC  Denies F/no BM  g-tube to gravity, no recorded output overnight  Afebrile/VSS    Medications:  Continuous Infusions:   sodium chloride 0.9% 50 mL/hr at 05/22/18 3064     Scheduled Meds:   albuterol-ipratropium  3 mL Nebulization Q6H WAKE    carvedilol  25 mg Oral BID WM    cloNIDine  0.3 mg Oral TID    dutasteride  0.5 mg Oral Daily    ferrous sulfate  325 mg Oral BID    guaiFENesin  600 mg Oral BID    isosorbide-hydrALAZINE 20-37.5 mg  2 tablet Oral TID    memantine  10 mg Oral BID    pantoprazole  40 mg Oral Daily    sodium bicarbonate  1 tablet Oral TID     PRN Meds:sodium chloride, " dextrose 50%, dextrose 50%, glucagon (human recombinant), glucose, glucose, HYDROmorphone, HYDROmorphone, insulin aspart U-100, labetalol, naloxone, ondansetron, promethazine (PHENERGAN) IVPB, sodium chloride 0.9%     Review of patient's allergies indicates:  No Known Allergies  Objective:     Vital Signs (Most Recent):  Temp: 98 °F (36.7 °C) (05/24/18 0426)  Pulse: 69 (05/24/18 0542)  Resp: 18 (05/24/18 0542)  BP: (!) 148/62 (05/24/18 0426)  SpO2: (!) 92 % (05/24/18 0544) Vital Signs (24h Range):  Temp:  [98 °F (36.7 °C)-99.6 °F (37.6 °C)] 98 °F (36.7 °C)  Pulse:  [59-71] 69  Resp:  [16-32] 18  SpO2:  [92 %-98 %] 92 %  BP: (119-148)/(56-85) 148/62     Weight: 106.5 kg (234 lb 13 oz)  Body mass index is 29.35 kg/m².    Intake/Output - Last 3 Shifts       05/22 0700 - 05/23 0659 05/23 0700 - 05/24 0659 05/24 0700 - 05/25 0659    P.O. 250 170     I.V. (mL/kg) 1905 (17.9) 1120 (10.5)     Blood 293.8      NG/GT  30     IV Piggyback 1100      Total Intake(mL/kg) 3548.8 (33.3) 1320 (12.4)     Urine (mL/kg/hr) 770 (0.3) 950 (0.4)     Emesis/NG output  0 (0)     Drains 216 (0.1) 0 (0)     Other  0 (0)     Stool  0 (0)     Blood  0 (0)     Total Output 986 950      Net +2562.8 +370             Urine Occurrence  0 x     Stool Occurrence 0 x 0 x     Emesis Occurrence  0 x           Physical Exam   Constitutional: He is oriented to person, place, and time. He appears well-developed and well-nourished. No distress.   HENT:   Head: Normocephalic and atraumatic.   Cardiovascular: Normal rate and regular rhythm.    Pulmonary/Chest: Effort normal. No respiratory distress.   Abdominal:   Soft, appropriate TTP  Incision - clean, dry and intact  G-tube to gravity   Neurological: He is alert and oriented to person, place, and time.   Skin: Skin is warm and dry.   Psychiatric: He has a normal mood and affect. His behavior is normal.       Significant Labs:  CBC:   Recent Labs  Lab 05/24/18  0438   WBC 10.44   RBC 2.70*   HGB 7.4*   HCT  24.2*      MCV 90   MCH 27.4   MCHC 30.6*     BMP:   Recent Labs  Lab 05/24/18  0438         K 3.7   *   CO2 23   BUN 34*   CREATININE 2.5*   CALCIUM 9.5   MG 2.0     CMP:   Recent Labs  Lab 05/24/18  0438      CALCIUM 9.5   ALBUMIN 1.7*   PROT 5.9*      K 3.7   CO2 23   *   BUN 34*   CREATININE 2.5*   ALKPHOS 123   ALT <5*   AST 9*   BILITOT 1.2*     LFTs:   Recent Labs  Lab 05/24/18  0438   ALT <5*   AST 9*   ALKPHOS 123   BILITOT 1.2*   PROT 5.9*   ALBUMIN 1.7*     Coagulation:   Recent Labs  Lab 05/21/18  1327   LABPROT 11.5   INR 1.1   APTT 23.3     Assessment/Plan:     * Adrenal tumor    Patient is 81 yo M who presents with large necrotic left adrenal mass, s/p adrenalectomy 5/21/18    -NPO  -IVF NS @ 50  -start g-tube clamping trials  -pain/nausea meds PRN  -GI prophylaxis  -PT/OT          Acute respiratory insufficiency, postoperative    Aggressive pulmonary toilet  -IS, acapella, CPT q4  -duonebs        Acute blood loss anemia    Received 1prbc 5/22/18  Continue to monitor        Hypomagnesemia    Replace         Dementia    Home meds  -memantine        Urinary retention    Home meds  -dutasteride  -calabrese        Non-ischemic cardiomyopathy    Home meds  -amlodipine, clonidine, carvedilol, bidil          Type 2 diabetes mellitus with diabetic peripheral angiopathy without gangrene, without long-term current use of insulin    SSI, accuchecks        Microcytic anemia    Iron supplement        Chronic atrial fibrillation    Holding anticoagulation given increased bleeding risk            Lexi Tolentino PA-C   [id number]  General Surgery  [facility]

## 2018-05-24 NOTE — PLAN OF CARE
Problem: Patient Care Overview  Goal: Plan of Care Review    Recommendations    Recommendation/Intervention:   1. Advance diet as medically feasible to diabetic diet.   2. RD following.    Goals: meet >85% of EEN/EPN via po intake  Nutrition Goal Status: goal not met

## 2018-05-24 NOTE — PROGRESS NOTES
" [facility]  Adult Nutrition  Progress Note    SUMMARY       Recommendations    Recommendation/Intervention:   1. Advance diet as medically feasible to diabetic diet.   2. RD following.    Goals: meet >85% of EEN/EPN via po intake  Nutrition Goal Status: goal not met  Communication of RD Recs:  (POC)    Reason for Assessment    Reason for Assessment: RD follow-up  Diagnosis:  (non-ischemic cardiomyopathy)  Relevant Medical History: T2DM, HLD, HTN, CERD, dementia, RCC, possible colon CA, CKD 4, Afib w/ pacemaker, CHF, CAD  Interdisciplinary Rounds: attended  General Information Comments: POD3 adrenalectomy and G tube placement. Pt currently NPO. Per note, waiting for nowel function to return.   Nutrition Discharge Planning: renal/diabetic diet      Nutrition/Diet History    Do you have any cultural, spiritual, Baptism conflicts, given your current situation?: none noted or reported   Factors Affecting Nutritional Intake: NPO    Anthropometrics    Temp: 97.8 °F (36.6 °C)  Height Method: Stated  Height: 6' 3" (190.5 cm)  Height (inches): 75 in  Weight Method: Bed Scale  Weight: 106.5 kg (234 lb 13 oz)  Weight (lb): 234.81 lb  Ideal Body Weight (IBW), Male: 196 lb  % Ideal Body Weight, Male (lb): 119.8 lb  BMI (Calculated): 29.4  BMI Grade: 25 - 29.9 - overweight       Lab/Procedures/Meds    Pertinent Labs Reviewed: reviewed  Pertinent Labs Comments: BUN 34H, Cr 2.5H, GFR 23.4H, T bili 1.2H, AST 9L, Alt <5L  Pertinent Medications Reviewed: reviewed  Pertinent Medications Comments: IVF, carvedilol, ferrous sulfate, pantoprazole    Physical Findings/Assessment    Overall Physical Appearance: nourished  Skin: edema    Estimated/Assessed Needs    Weight Used For Calorie Calculations: 106.5 kg (234 lb 12.6 oz)  Energy Calorie Requirements (kcal): 2046 kcal/day  Energy Need Method: Maribel-St Johnson (X 1.1)  Protein Requirements: 85-96 gm/day (0.8-0.9 gm/kg)  Weight Used For Protein Calculations: 106.5 kg " (234 lb 12.6 oz)  Fluid Requirements (mL): per MD  Fluid Need Method: RDA Method  RDA Method (mL): 2046    Nutrition Prescription Ordered    Current Diet Order: NPO    Evaluation of Received Nutrient/Fluid Intake    % Intake of Estimated Energy Needs: 0 - 25 %  % Meal Intake: NPO    Nutrition Risk    Level of Risk/Frequency of Follow-up: high (f/u 2 X wk)     Assessment and Plan    * Adrenal tumor    Contributing Nutrition Diagnosis  Inadequate Energy Intake    Related to (etiology):   S/p adrenalectomy     Signs and Symptoms (as evidenced by):   Pt NPO and waiting for bowel function to return     Interventions/Recommendations (treatment strategy):  See recs    Nutrition Diagnosis Status:   New               Monitor and Evaluation    Food and Nutrient Intake: energy intake, food and beverage intake  Food and Nutrient Adminstration: diet order  Physical Activity and Function: nutrition-related ADLs and IADLs  Anthropometric Measurements: weight change, body mass index, weight  Biochemical Data, Medical Tests and Procedures: electrolyte and renal panel, gastrointestinal profile, glucose/endocrine profile, inflammatory profile, lipid profile  Nutrition-Focused Physical Findings: overall appearance     Nutrition Follow-Up    RD Follow-up?: Yes

## 2018-05-25 LAB
ALBUMIN SERPL BCP-MCNC: 1.7 G/DL
ALP SERPL-CCNC: 126 U/L
ALT SERPL W/O P-5'-P-CCNC: <5 U/L
ANION GAP SERPL CALC-SCNC: 11 MMOL/L
AST SERPL-CCNC: 10 U/L
BASOPHILS # BLD AUTO: 0.06 K/UL
BASOPHILS # BLD AUTO: 0.06 K/UL
BASOPHILS NFR BLD: 0.5 %
BASOPHILS NFR BLD: 0.5 %
BILIRUB SERPL-MCNC: 1.4 MG/DL
BLD PROD TYP BPU: NORMAL
BLD PROD TYP BPU: NORMAL
BLOOD UNIT EXPIRATION DATE: NORMAL
BLOOD UNIT EXPIRATION DATE: NORMAL
BLOOD UNIT TYPE CODE: 5100
BLOOD UNIT TYPE CODE: 5100
BLOOD UNIT TYPE: NORMAL
BLOOD UNIT TYPE: NORMAL
BUN SERPL-MCNC: 41 MG/DL
CALCIUM SERPL-MCNC: 9.6 MG/DL
CHLORIDE SERPL-SCNC: 113 MMOL/L
CO2 SERPL-SCNC: 19 MMOL/L
CODING SYSTEM: NORMAL
CODING SYSTEM: NORMAL
CREAT SERPL-MCNC: 2.4 MG/DL
DIFFERENTIAL METHOD: ABNORMAL
DIFFERENTIAL METHOD: ABNORMAL
DISPENSE STATUS: NORMAL
DISPENSE STATUS: NORMAL
EOSINOPHIL # BLD AUTO: 0.3 K/UL
EOSINOPHIL # BLD AUTO: 0.4 K/UL
EOSINOPHIL NFR BLD: 2.7 %
EOSINOPHIL NFR BLD: 3.6 %
ERYTHROCYTE [DISTWIDTH] IN BLOOD BY AUTOMATED COUNT: 18.6 %
ERYTHROCYTE [DISTWIDTH] IN BLOOD BY AUTOMATED COUNT: 18.6 %
EST. GFR  (AFRICAN AMERICAN): 28.4 ML/MIN/1.73 M^2
EST. GFR  (NON AFRICAN AMERICAN): 24.6 ML/MIN/1.73 M^2
GLUCOSE SERPL-MCNC: 109 MG/DL
HCT VFR BLD AUTO: 24.4 %
HCT VFR BLD AUTO: 24.6 %
HGB BLD-MCNC: 7.5 G/DL
HGB BLD-MCNC: 7.6 G/DL
IMM GRANULOCYTES # BLD AUTO: 0.08 K/UL
IMM GRANULOCYTES # BLD AUTO: 0.09 K/UL
IMM GRANULOCYTES NFR BLD AUTO: 0.7 %
IMM GRANULOCYTES NFR BLD AUTO: 0.8 %
LYMPHOCYTES # BLD AUTO: 0.7 K/UL
LYMPHOCYTES # BLD AUTO: 0.7 K/UL
LYMPHOCYTES NFR BLD: 6.3 %
LYMPHOCYTES NFR BLD: 6.6 %
MAGNESIUM SERPL-MCNC: 2.2 MG/DL
MCH RBC QN AUTO: 26.9 PG
MCH RBC QN AUTO: 27.6 PG
MCHC RBC AUTO-ENTMCNC: 30.5 G/DL
MCHC RBC AUTO-ENTMCNC: 31.1 G/DL
MCV RBC AUTO: 88 FL
MCV RBC AUTO: 89 FL
MONOCYTES # BLD AUTO: 1 K/UL
MONOCYTES # BLD AUTO: 1 K/UL
MONOCYTES NFR BLD: 8.8 %
MONOCYTES NFR BLD: 8.9 %
NEUTROPHILS # BLD AUTO: 9 K/UL
NEUTROPHILS # BLD AUTO: 9.3 K/UL
NEUTROPHILS NFR BLD: 79.7 %
NEUTROPHILS NFR BLD: 80.9 %
NRBC BLD-RTO: 0 /100 WBC
NRBC BLD-RTO: 0 /100 WBC
PHOSPHATE SERPL-MCNC: 3.7 MG/DL
PLATELET # BLD AUTO: 246 K/UL
PLATELET # BLD AUTO: 248 K/UL
PMV BLD AUTO: 12.2 FL
PMV BLD AUTO: 12.3 FL
POCT GLUCOSE: 111 MG/DL (ref 70–110)
POCT GLUCOSE: 145 MG/DL (ref 70–110)
POCT GLUCOSE: 150 MG/DL (ref 70–110)
POTASSIUM SERPL-SCNC: 3.8 MMOL/L
PROT SERPL-MCNC: 5.9 G/DL
RBC # BLD AUTO: 2.75 M/UL
RBC # BLD AUTO: 2.79 M/UL
SODIUM SERPL-SCNC: 143 MMOL/L
TRANS ERYTHROCYTES VOL PATIENT: NORMAL ML
TRANS ERYTHROCYTES VOL PATIENT: NORMAL ML
WBC # BLD AUTO: 11.23 K/UL
WBC # BLD AUTO: 11.55 K/UL

## 2018-05-25 PROCEDURE — 97530 THERAPEUTIC ACTIVITIES: CPT

## 2018-05-25 PROCEDURE — 86580 TB INTRADERMAL TEST: CPT | Performed by: SURGERY

## 2018-05-25 PROCEDURE — 27000646 HC AEROBIKA DEVICE

## 2018-05-25 PROCEDURE — 97535 SELF CARE MNGMENT TRAINING: CPT

## 2018-05-25 PROCEDURE — G8987 SELF CARE CURRENT STATUS: HCPCS | Mod: CK

## 2018-05-25 PROCEDURE — 80053 COMPREHEN METABOLIC PANEL: CPT

## 2018-05-25 PROCEDURE — 36415 COLL VENOUS BLD VENIPUNCTURE: CPT

## 2018-05-25 PROCEDURE — 25000003 PHARM REV CODE 250: Performed by: STUDENT IN AN ORGANIZED HEALTH CARE EDUCATION/TRAINING PROGRAM

## 2018-05-25 PROCEDURE — 84100 ASSAY OF PHOSPHORUS: CPT

## 2018-05-25 PROCEDURE — 63600175 PHARM REV CODE 636 W HCPCS: Performed by: STUDENT IN AN ORGANIZED HEALTH CARE EDUCATION/TRAINING PROGRAM

## 2018-05-25 PROCEDURE — 94761 N-INVAS EAR/PLS OXIMETRY MLT: CPT

## 2018-05-25 PROCEDURE — 99900035 HC TECH TIME PER 15 MIN (STAT)

## 2018-05-25 PROCEDURE — 83735 ASSAY OF MAGNESIUM: CPT

## 2018-05-25 PROCEDURE — 63600175 PHARM REV CODE 636 W HCPCS: Performed by: SURGERY

## 2018-05-25 PROCEDURE — 27000221 HC OXYGEN, UP TO 24 HOURS

## 2018-05-25 PROCEDURE — G8988 SELF CARE GOAL STATUS: HCPCS | Mod: CJ

## 2018-05-25 PROCEDURE — 25000242 PHARM REV CODE 250 ALT 637 W/ HCPCS: Performed by: STUDENT IN AN ORGANIZED HEALTH CARE EDUCATION/TRAINING PROGRAM

## 2018-05-25 PROCEDURE — 94664 DEMO&/EVAL PT USE INHALER: CPT

## 2018-05-25 PROCEDURE — 20600001 HC STEP DOWN PRIVATE ROOM

## 2018-05-25 PROCEDURE — 94640 AIRWAY INHALATION TREATMENT: CPT

## 2018-05-25 PROCEDURE — 85025 COMPLETE CBC W/AUTO DIFF WBC: CPT

## 2018-05-25 PROCEDURE — 25000003 PHARM REV CODE 250: Performed by: SURGERY

## 2018-05-25 RX ORDER — HEPARIN SODIUM,PORCINE/D5W 25000/250
17 INTRAVENOUS SOLUTION INTRAVENOUS CONTINUOUS
Status: DISCONTINUED | OUTPATIENT
Start: 2018-05-25 | End: 2018-05-25

## 2018-05-25 RX ORDER — HEPARIN SODIUM,PORCINE/D5W 25000/250
17 INTRAVENOUS SOLUTION INTRAVENOUS CONTINUOUS
Status: DISCONTINUED | OUTPATIENT
Start: 2018-05-25 | End: 2018-05-28

## 2018-05-25 RX ORDER — TAMSULOSIN HYDROCHLORIDE 0.4 MG/1
0.4 CAPSULE ORAL DAILY
Status: DISCONTINUED | OUTPATIENT
Start: 2018-05-25 | End: 2018-05-28 | Stop reason: HOSPADM

## 2018-05-25 RX ADMIN — CLONIDINE HYDROCHLORIDE 0.3 MG: 0.1 TABLET ORAL at 09:05

## 2018-05-25 RX ADMIN — HYDRALAZINE HYDROCHLORIDE AND ISOSORBIDE DINITRATE 2 TABLET: 37.5; 2 TABLET, FILM COATED ORAL at 02:05

## 2018-05-25 RX ADMIN — HEPARIN SODIUM 19.4 UNITS/KG/HR: 10000 INJECTION, SOLUTION INTRAVENOUS at 07:05

## 2018-05-25 RX ADMIN — CARVEDILOL 25 MG: 25 TABLET, FILM COATED ORAL at 05:05

## 2018-05-25 RX ADMIN — FERROUS SULFATE TAB EC 325 MG (65 MG FE EQUIVALENT) 325 MG: 325 (65 FE) TABLET DELAYED RESPONSE at 10:05

## 2018-05-25 RX ADMIN — CARVEDILOL 25 MG: 25 TABLET, FILM COATED ORAL at 09:05

## 2018-05-25 RX ADMIN — SODIUM BICARBONATE 650 MG TABLET 650 MG: at 02:05

## 2018-05-25 RX ADMIN — HEPARIN SODIUM 17 UNITS/KG/HR: 10000 INJECTION, SOLUTION INTRAVENOUS at 10:05

## 2018-05-25 RX ADMIN — GUAIFENESIN 600 MG: 600 TABLET, EXTENDED RELEASE ORAL at 10:05

## 2018-05-25 RX ADMIN — SODIUM BICARBONATE 650 MG TABLET 650 MG: at 10:05

## 2018-05-25 RX ADMIN — GUAIFENESIN 600 MG: 600 TABLET, EXTENDED RELEASE ORAL at 09:05

## 2018-05-25 RX ADMIN — TAMSULOSIN HYDROCHLORIDE 0.4 MG: 0.4 CAPSULE ORAL at 01:05

## 2018-05-25 RX ADMIN — PANTOPRAZOLE SODIUM 40 MG: 40 TABLET, DELAYED RELEASE ORAL at 09:05

## 2018-05-25 RX ADMIN — HYDROMORPHONE HYDROCHLORIDE 0.5 MG: 1 INJECTION, SOLUTION INTRAMUSCULAR; INTRAVENOUS; SUBCUTANEOUS at 06:05

## 2018-05-25 RX ADMIN — IPRATROPIUM BROMIDE AND ALBUTEROL SULFATE 3 ML: .5; 3 SOLUTION RESPIRATORY (INHALATION) at 07:05

## 2018-05-25 RX ADMIN — CLONIDINE HYDROCHLORIDE 0.3 MG: 0.1 TABLET ORAL at 02:05

## 2018-05-25 RX ADMIN — IPRATROPIUM BROMIDE AND ALBUTEROL SULFATE 3 ML: .5; 3 SOLUTION RESPIRATORY (INHALATION) at 09:05

## 2018-05-25 RX ADMIN — SODIUM CHLORIDE: 0.9 INJECTION, SOLUTION INTRAVENOUS at 05:05

## 2018-05-25 RX ADMIN — DUTASTERIDE 0.5 MG: 0.5 CAPSULE, LIQUID FILLED ORAL at 09:05

## 2018-05-25 RX ADMIN — HYDROMORPHONE HYDROCHLORIDE 0.5 MG: 1 INJECTION, SOLUTION INTRAMUSCULAR; INTRAVENOUS; SUBCUTANEOUS at 02:05

## 2018-05-25 RX ADMIN — IPRATROPIUM BROMIDE AND ALBUTEROL SULFATE 3 ML: .5; 3 SOLUTION RESPIRATORY (INHALATION) at 11:05

## 2018-05-25 RX ADMIN — MEMANTINE HYDROCHLORIDE 10 MG: 5 TABLET ORAL at 10:05

## 2018-05-25 RX ADMIN — HYDRALAZINE HYDROCHLORIDE AND ISOSORBIDE DINITRATE 2 TABLET: 37.5; 2 TABLET, FILM COATED ORAL at 09:05

## 2018-05-25 RX ADMIN — FERROUS SULFATE TAB EC 325 MG (65 MG FE EQUIVALENT) 325 MG: 325 (65 FE) TABLET DELAYED RESPONSE at 09:05

## 2018-05-25 RX ADMIN — SODIUM BICARBONATE 650 MG TABLET 650 MG: at 09:05

## 2018-05-25 RX ADMIN — HYDRALAZINE HYDROCHLORIDE AND ISOSORBIDE DINITRATE 2 TABLET: 37.5; 2 TABLET, FILM COATED ORAL at 10:05

## 2018-05-25 RX ADMIN — IPRATROPIUM BROMIDE AND ALBUTEROL SULFATE 3 ML: .5; 3 SOLUTION RESPIRATORY (INHALATION) at 03:05

## 2018-05-25 RX ADMIN — MEMANTINE HYDROCHLORIDE 10 MG: 5 TABLET ORAL at 09:05

## 2018-05-25 RX ADMIN — CLONIDINE HYDROCHLORIDE 0.3 MG: 0.1 TABLET ORAL at 10:05

## 2018-05-25 RX ADMIN — Medication 5 UNITS: at 05:05

## 2018-05-25 NOTE — PT/OT/SLP PROGRESS
Occupational Therapy   Treatment    Name: Jose Cuadra  MRN: [id number]  Admitting Diagnosis:  Adrenal tumor  4 Days Post-Op    Recommendations:     Discharge Recommendations: nursing facility, skilled  Discharge Equipment Recommendations:  none  Barriers to discharge:  Decreased caregiver support, Inaccessible home environment    Subjective     Communicated with: RN prior to session. Pt agreeable to participate in OT session.   Pain/Comfort:  · Pain Rating 1:  (Pt did not rate)    Patients cultural, spiritual, Episcopal conflicts given the current situation: none noted or reported     Objective:     Patient found with: PICC line, pulse ox (continuous), SCD, telemetry, peripheral IV    General Precautions: Standard, fall, hearing impaired, vision impaired   Orthopedic Precautions:N/A   Braces: N/A     Occupational Performance:    Bed Mobility:    · Patient completed Rolling/Turning to Right with contact guard assistance  · Patient completed Supine to Sit with minimum assistance for BLE management and with HOB elevated      Functional Mobility/Transfers:  · Patient completed Sit <> Stand Transfer with minimum assistance  with  rolling walker   · Patient completed Bed >Chair Transfer using Stand Pivot technique with minimum assistance with rolling walker  · Patient completed chair>bed with max A using RW secondary to noted increased fatigue and generalized weakness    Activities of Daily Living:  · Feeding:  supervision and set-up sitting upright in bedside chair  · UB Dressing: contact guard assistance to don/doff gown sitting EOB secondary to line managment   · LB Dressing: moderate assistance to don/doff socks while sitting EOB    Patient left HOB elevated with all lines intact, call button in reach, bed alarm on and RN notified    AMPAC 6 Click:  AMPA Total Score: 17    Treatment & Education:  · Pt educated on safety awareness with functional transfers and with completion of ADLS and OT POC  · Pt educated on role  of OT and purpose of evaluation and goals for therapy  · Pt completed ADLS and functional mobility for treatment session as noted above  · Pt and pt family educated on importance of completing OOB with nursing staff assistance to increase pt functional activity tolerance and for the prevention of secondary complications following surgery  · Pt educated on line management in various set-ups (sitting in recliner, supine in bed, functional mobility tasks)  · White board/Communication board updated       Education:    Assessment:     Jose Cuadra is a 82 y.o. male with a medical diagnosis of Adrenal tumor.  He presents with performance deficits affecting function including weakness, impaired endurance, impaired self care skills, gait instability, impaired functional mobilty, impaired cognition, impaired balance, decreased coordination, impaired cardiopulmonary response to activity, decreased lower extremity function, visual deficits.  Pt tolerated treatment well, despite noted/reported increased confusion and generalized fatigue. Pt required increased assist for functional transfers following sitting upright in bedside chair x ~1 hour. Pt will continue to benefit from skilled OT services to address the above listed impairments, decrease caregiver burden, and increase pt functional independence level. Recommend SNF placement upon discharge.     Rehab Prognosis:  fair; patient would benefit from acute skilled OT services to address these deficits and reach maximum level of function.       Plan:     Patient to be seen 4 x/week to address the above listed problems via self-care/home management, therapeutic activities, therapeutic exercises, neuromuscular re-education  · Plan of Care Expires: 06/20/18  · Plan of Care Reviewed with: patient    This Plan of care has been discussed with the patient who was involved in its development and understands and is in agreement with the identified goals and treatment plan    GOALS:     Occupational Therapy Goals        Problem: Occupational Therapy Goal    Goal Priority Disciplines Outcome Interventions   Occupational Therapy Goal     OT, PT/OT Ongoing (interventions implemented as appropriate)    Description:  Goals to be met by: 06/10/2018     Patient will increase functional independence with ADLs by performing:    UE Dressing with Supervision.  LE Dressing with Stand-by Assistance.  Grooming while standing with Stand-by Assistance.  Toileting from bedside commode with Stand-by Assistance for hygiene and clothing management.   Toilet transfer to bedside commode with Stand-by Assistance.  Upper extremity exercise program x20 reps per handout, with supervision and assistance as needed.                      Time Tracking:     OT Date of Treatment: 05/25/18  OT Start Time:  1426(1534 second p.m. attempt)  OT Stop Time:  1445 (1543 second p.m. attempt)  OT Total Time (min): 28 min     Billable Minutes:Self Care/Home Management 8  Therapeutic Activity 20    Tereso Cardoso OT  5/25/2018

## 2018-05-25 NOTE — PROGRESS NOTES
Patient has not urinated since 0645. Bladder scan revealed 377 cc of urine. MD lovett, Flomax ordered. WCTM for urine output for a few more hours.

## 2018-05-25 NOTE — PROGRESS NOTES
"[facility]  General Surgery  Progress Note    Subjective:     History of Present Illness:  Patient is an 81 yo M with history of dementia, RCC, possible colon CA, CKD 4, Afib with pacemaker, CHF with EF 36%, and a large abdominal (likely adrenal) mass who presented to OSH 5/2/18 with pneumonia and during his admission was found to have a large adrenal mass that was enlarged from previous CT scan, and he was transferred here for further workup. A CT scan from 2016 showed a similar sized mass. He underwent biopsy which showed no obvious malignancy, mostly necrotic tissue. He has completed his course of abx for pneumonia and his SOA has resolved.    When asked, he states the mass has been present for years. He denies any nausea, vomiting, weight loss, fevers, or chills. He continues to have bowel movements, with bowel incontinence (he says that's his baseline). He has been off anticoagulation despite being in afib. He has been having issues urinating.    He has undergone right nephrectomy and colon resection of 22 cm "maybe for cancer".    Post-Op Info:  Procedure(s) (LRB):  ADRENALECTOMY (Left)  INSERTION-TUBE-GASTROSTOMY (N/A)   4 Days Post-Op     Interval History:   AFVSS overnight. H&H stable.  Tolerated sips and ice chips with G tube clamped.    Medications:  Continuous Infusions:   sodium chloride 0.9% 50 mL/hr at 05/25/18 0533     Scheduled Meds:   albuterol-ipratropium  3 mL Nebulization Q4H WAKE    carvedilol  25 mg Oral BID WM    cloNIDine  0.3 mg Oral TID    dutasteride  0.5 mg Oral Daily    ferrous sulfate  325 mg Oral BID    guaiFENesin  600 mg Oral BID    isosorbide-hydrALAZINE 20-37.5 mg  2 tablet Oral TID    memantine  10 mg Oral BID    pantoprazole  40 mg Oral Daily    sodium bicarbonate  1 tablet Oral TID     PRN Meds:sodium chloride, dextrose 50%, dextrose 50%, glucagon (human recombinant), glucose, glucose, HYDROmorphone, HYDROmorphone, insulin aspart U-100, labetalol, " naloxone, ondansetron, promethazine (PHENERGAN) IVPB, sodium chloride 0.9%     Review of patient's allergies indicates:  No Known Allergies  Objective:     Vital Signs (Most Recent):  Temp: 98.5 °F (36.9 °C) (05/25/18 0427)  Pulse: 69 (05/25/18 0427)  Resp: 18 (05/25/18 0427)  BP: (!) 142/74 (05/25/18 0427)  SpO2: (!) 93 % (05/25/18 0427) Vital Signs (24h Range):  Temp:  [97.1 °F (36.2 °C)-98.5 °F (36.9 °C)] 98.5 °F (36.9 °C)  Pulse:  [67-74] 69  Resp:  [16-18] 18  SpO2:  [92 %-97 %] 93 %  BP: (138-158)/(62-74) 142/74     Weight: 106.5 kg (234 lb 13 oz)  Body mass index is 29.35 kg/m².    Intake/Output - Last 3 Shifts       05/23 0700 - 05/24 0659 05/24 0700 - 05/25 0659    P.O. 170 390    I.V. (mL/kg) 1120 (10.5) 1306.7 (12.3)    NG/GT 30     Total Intake(mL/kg) 1320 (12.4) 1696.7 (15.9)    Urine (mL/kg/hr) 950 (0.4) 870 (0.3)    Emesis/NG output 0 (0) 0 (0)    Drains 0 (0) 0 (0)    Other 0 (0) 0 (0)    Stool 0 (0) 0 (0)    Blood 0 (0) 0 (0)    Total Output 950 870    Net +370 +826.7          Urine Occurrence 0 x 0 x    Stool Occurrence 0 x 0 x    Emesis Occurrence 0 x 0 x          Physical Exam   Constitutional: He is oriented to person, place, and time. He appears well-developed and well-nourished. No distress.   HENT:   Head: Normocephalic and atraumatic.   Cardiovascular: Normal rate and regular rhythm.    Pulmonary/Chest: Effort normal. No respiratory distress.   Abdominal:   Soft, appropriate TTP  Incision - clean, dry and intact  G-tube to gravity   Neurological: He is alert and oriented to person, place, and time.   Skin: Skin is warm and dry.   Psychiatric: He has a normal mood and affect. His behavior is normal.       Significant Labs:  CBC:     Recent Labs  Lab 05/25/18 0447   WBC 11.55   RBC 2.79*   HGB 7.5*   HCT 24.6*      MCV 88   MCH 26.9*   MCHC 30.5*     BMP:     Recent Labs  Lab 05/25/18 0447         K 3.8   *   CO2 19*   BUN 41*   CREATININE 2.4*   CALCIUM 9.6   MG  2.2     CMP:     Recent Labs  Lab 05/25/18  0447      CALCIUM 9.6   ALBUMIN 1.7*   PROT 5.9*      K 3.8   CO2 19*   *   BUN 41*   CREATININE 2.4*   ALKPHOS 126   ALT <5*   AST 10   BILITOT 1.4*     LFTs:     Recent Labs  Lab 05/25/18  0447   ALT <5*   AST 10   ALKPHOS 126   BILITOT 1.4*   PROT 5.9*   ALBUMIN 1.7*     Coagulation:     Recent Labs  Lab 05/21/18  1327   LABPROT 11.5   INR 1.1   APTT 23.3     Assessment/Plan:     * Adrenal tumor    Patient is 83 yo M who presents with large necrotic left adrenal mass, s/p adrenalectomy 5/21/18    -Clears  -IVF NS @ 50  -keep g-tube clamped  -pain/nausea meds PRN  -GI prophylaxis  -PT/OT          Acute respiratory insufficiency, postoperative    Aggressive pulmonary toilet  -IS, acapella, CPT q4  -duonebs        Acute blood loss anemia    Received 1prbc 5/22/18  Continue to monitor        Hypomagnesemia    Replace         Dementia    Home meds  -memantine        Urinary retention    Home meds  -dutasteride  -calabrese        Non-ischemic cardiomyopathy    Home meds  -amlodipine, clonidine, carvedilol, bidil          Type 2 diabetes mellitus with diabetic peripheral angiopathy without gangrene, without long-term current use of insulin    SSI, accuchecks        Microcytic anemia    Iron supplement        Chronic atrial fibrillation    Restarting heparin drip today            Jonathan Schoen, MD  General Surgery  [facility]

## 2018-05-25 NOTE — PLAN OF CARE
Problem: Patient Care Overview  Goal: Plan of Care Review  Outcome: Ongoing (interventions implemented as appropriate)  Patient A/Ox4, however is intermittently confused to situation (able to be reoriented quickly). Care plan reviewed with patient and daughters-verbalizes understanding. VSS. Tolerating clear liquid diet well. No reports of pain. Incision clean, dry, and intact. GTube intact and patent. Patient retaining urine, MD aware-Bladder scan revealed 550cc of urine at approx 1800. Carney inserted per MD order-500cc from Carney was returned. Patient on Tele-Paced NSR. Patient up in chair for approx 1hr today w assist X2. Patient remains free of falls. Patient states no other needs at this time. WCTM.

## 2018-05-25 NOTE — SUBJECTIVE & OBJECTIVE
Interval History:   AFVSS overnight. H&H stable.  Tolerated sips and ice chips with G tube clamped.    Medications:  Continuous Infusions:   sodium chloride 0.9% 50 mL/hr at 05/25/18 0533     Scheduled Meds:   albuterol-ipratropium  3 mL Nebulization Q4H WAKE    carvedilol  25 mg Oral BID WM    cloNIDine  0.3 mg Oral TID    dutasteride  0.5 mg Oral Daily    ferrous sulfate  325 mg Oral BID    guaiFENesin  600 mg Oral BID    isosorbide-hydrALAZINE 20-37.5 mg  2 tablet Oral TID    memantine  10 mg Oral BID    pantoprazole  40 mg Oral Daily    sodium bicarbonate  1 tablet Oral TID     PRN Meds:sodium chloride, dextrose 50%, dextrose 50%, glucagon (human recombinant), glucose, glucose, HYDROmorphone, HYDROmorphone, insulin aspart U-100, labetalol, naloxone, ondansetron, promethazine (PHENERGAN) IVPB, sodium chloride 0.9%     Review of patient's allergies indicates:  No Known Allergies  Objective:     Vital Signs (Most Recent):  Temp: 98.5 °F (36.9 °C) (05/25/18 0427)  Pulse: 69 (05/25/18 0427)  Resp: 18 (05/25/18 0427)  BP: (!) 142/74 (05/25/18 0427)  SpO2: (!) 93 % (05/25/18 0427) Vital Signs (24h Range):  Temp:  [97.1 °F (36.2 °C)-98.5 °F (36.9 °C)] 98.5 °F (36.9 °C)  Pulse:  [67-74] 69  Resp:  [16-18] 18  SpO2:  [92 %-97 %] 93 %  BP: (138-158)/(62-74) 142/74     Weight: 106.5 kg (234 lb 13 oz)  Body mass index is 29.35 kg/m².    Intake/Output - Last 3 Shifts       05/23 0700 - 05/24 0659 05/24 0700 - 05/25 0659    P.O. 170 390    I.V. (mL/kg) 1120 (10.5) 1306.7 (12.3)    NG/GT 30     Total Intake(mL/kg) 1320 (12.4) 1696.7 (15.9)    Urine (mL/kg/hr) 950 (0.4) 870 (0.3)    Emesis/NG output 0 (0) 0 (0)    Drains 0 (0) 0 (0)    Other 0 (0) 0 (0)    Stool 0 (0) 0 (0)    Blood 0 (0) 0 (0)    Total Output 950 870    Net +370 +826.7          Urine Occurrence 0 x 0 x    Stool Occurrence 0 x 0 x    Emesis Occurrence 0 x 0 x          Physical Exam   Constitutional: He is oriented to person, place, and time. He  appears well-developed and well-nourished. No distress.   HENT:   Head: Normocephalic and atraumatic.   Cardiovascular: Normal rate and regular rhythm.    Pulmonary/Chest: Effort normal. No respiratory distress.   Abdominal:   Soft, appropriate TTP  Incision - clean, dry and intact  G-tube to gravity   Neurological: He is alert and oriented to person, place, and time.   Skin: Skin is warm and dry.   Psychiatric: He has a normal mood and affect. His behavior is normal.       Significant Labs:  CBC:     Recent Labs  Lab 05/25/18 0447   WBC 11.55   RBC 2.79*   HGB 7.5*   HCT 24.6*      MCV 88   MCH 26.9*   MCHC 30.5*     BMP:     Recent Labs  Lab 05/25/18  0447         K 3.8   *   CO2 19*   BUN 41*   CREATININE 2.4*   CALCIUM 9.6   MG 2.2     CMP:     Recent Labs  Lab 05/25/18  0447      CALCIUM 9.6   ALBUMIN 1.7*   PROT 5.9*      K 3.8   CO2 19*   *   BUN 41*   CREATININE 2.4*   ALKPHOS 126   ALT <5*   AST 10   BILITOT 1.4*     LFTs:     Recent Labs  Lab 05/25/18  0447   ALT <5*   AST 10   ALKPHOS 126   BILITOT 1.4*   PROT 5.9*   ALBUMIN 1.7*     Coagulation:     Recent Labs  Lab 05/21/18  1327   LABPROT 11.5   INR 1.1   APTT 23.3

## 2018-05-25 NOTE — PLAN OF CARE
"Problem: Patient Care Overview  Goal: Plan of Care Review  Plan of care reviewed with patient, Pt. Alert to self only, continued to reinforce care plan with pt. 2LNC with spo2 at 94%. Tele monitor on pt. Vitals stable, pt. With weak cough, nurse educated pt. On use of Incentive spirometer and how to use pillow to splint abdomen while coughing. Notified MD and respiratory of pt. Weak cough, respiratory stated "we are on our way to see pt". ML incision to abdomen with staples intact. Gtube to left abdomen clamped. Complaints of pain, medicated with prn meds. No complaints of nausea. Carney removed per order at 1830, condom catheter placed. Frequent weight shifting of patient on pillows and wedge. Air cushion placed under pt. No bowel movement occurring during shift. NPO diet with sips of water with meds, and ice chips given. SCD's on pt, heels elevated off bed. Blood glucose checks completed Q6. Call light answered in person.       "

## 2018-05-25 NOTE — PLAN OF CARE
Problem: Occupational Therapy Goal  Goal: Occupational Therapy Goal  Goals to be met by: 06/10/2018     Patient will increase functional independence with ADLs by performing:    UE Dressing with Supervision.  LE Dressing with Stand-by Assistance.  Grooming while standing with Stand-by Assistance.  Toileting from bedside commode with Stand-by Assistance for hygiene and clothing management.   Toilet transfer to bedside commode with Stand-by Assistance.  Upper extremity exercise program x20 reps per handout, with supervision and assistance as needed.     Outcome: Ongoing (interventions implemented as appropriate)  Pt progressing towards all goals at this time. All goals remain appropriate. Revise goals as needed.    Tereso Cardoso, OT  5/25/2018

## 2018-05-25 NOTE — PLAN OF CARE
10:00 Am Updated covering Sherley COLVIN (X [phone]), on discharge plan: Referral was already sent to [facility]. (2 daughters had this plan, prior to this admit, w/patient then transferring to [facility]. They state he can not go home. He can not care for himself any longer.) Per social workers note from yesterday, [facility] liasion-Mary said she would need to speak to her DON and get back with SW because they are asking for the path report. Path report is pending.     Not medically stable for discharge today. Diet to advance to clears, g-tube clamped.     10:20 AM Spoke to Dr. Soliman regarding above. Patient not ready for discharge. He states Path report will take 2 weeks for the results.     10:28 AM [facility]ph [phone][facility] & informed her path report results will take 2 weeks to come in. Informed her he is not ready for discharge today, his diet will be advanced to clears today, w/g-tube remaining clamped;Earliest discharge to them could be Monday 5/28/18. She is aware he would be SNF then transition to assisted NH. She verbalized her understanding & states she will talk to her DON & get back to /SW.     2:36 PM Received call from [facility] liaison, Mary, stating family has completed paper work, and they have accepted him for Monday, 5/28/18, at their SNF facility. She asked what his transportation would be? Informed her a W/C van ride would be arranged by [facility].

## 2018-05-25 NOTE — ASSESSMENT & PLAN NOTE
Patient is 81 yo M who presents with large necrotic left adrenal mass, s/p adrenalectomy 5/21/18    -Clears  -IVF NS @ 50  -keep g-tube clamped  -pain/nausea meds PRN  -GI prophylaxis  -PT/OT

## 2018-05-25 NOTE — PLAN OF CARE
Problem: Patient Care Overview  Goal: Plan of Care Review  Outcome: Ongoing (interventions implemented as appropriate)  Pt AAOx4, forget at times. Pt NPO except sips with meds & ice chips. Mouth swabs at bedside. Pain managed with PRN pain meds. Pt on tele, NSR, all other VSS on 2L NC. SCDs on and heels elevated. Pt int straight cathed @ 345 for urinary retention. Pt DTV @ 945am. G tube clamped, denied nausea overnight. Pt slept between care. Call light in reach and bed in lowest position. Bed alarm set. Pt remains free of falls and injury. No acute events this shift. Will continue to monitor.

## 2018-05-26 PROBLEM — E83.42 HYPOMAGNESEMIA: Status: RESOLVED | Noted: 2018-05-22 | Resolved: 2018-05-26

## 2018-05-26 LAB
ABO + RH BLD: NORMAL
ALBUMIN SERPL BCP-MCNC: 1.6 G/DL
ALP SERPL-CCNC: 137 U/L
ALT SERPL W/O P-5'-P-CCNC: <5 U/L
ANION GAP SERPL CALC-SCNC: 7 MMOL/L
AST SERPL-CCNC: 12 U/L
BASOPHILS # BLD AUTO: 0.04 K/UL
BASOPHILS # BLD AUTO: 0.04 K/UL
BASOPHILS # BLD AUTO: 0.07 K/UL
BASOPHILS NFR BLD: 0.4 %
BASOPHILS NFR BLD: 0.4 %
BASOPHILS NFR BLD: 0.8 %
BILIRUB SERPL-MCNC: 1 MG/DL
BLD GP AB SCN CELLS X3 SERPL QL: NORMAL
BLD PROD TYP BPU: NORMAL
BLD PROD TYP BPU: NORMAL
BLOOD UNIT EXPIRATION DATE: NORMAL
BLOOD UNIT EXPIRATION DATE: NORMAL
BLOOD UNIT TYPE CODE: 5100
BLOOD UNIT TYPE CODE: 5100
BLOOD UNIT TYPE: NORMAL
BLOOD UNIT TYPE: NORMAL
BUN SERPL-MCNC: 43 MG/DL
C DIFF GDH STL QL: NEGATIVE
C DIFF TOX A+B STL QL IA: NEGATIVE
CALCIUM SERPL-MCNC: 9.1 MG/DL
CHLORIDE SERPL-SCNC: 111 MMOL/L
CO2 SERPL-SCNC: 23 MMOL/L
CODING SYSTEM: NORMAL
CODING SYSTEM: NORMAL
CREAT SERPL-MCNC: 2.5 MG/DL
DIFFERENTIAL METHOD: ABNORMAL
DISPENSE STATUS: NORMAL
DISPENSE STATUS: NORMAL
EOSINOPHIL # BLD AUTO: 0.4 K/UL
EOSINOPHIL NFR BLD: 3.8 %
EOSINOPHIL NFR BLD: 3.8 %
EOSINOPHIL NFR BLD: 4.8 %
ERYTHROCYTE [DISTWIDTH] IN BLOOD BY AUTOMATED COUNT: 17.4 %
ERYTHROCYTE [DISTWIDTH] IN BLOOD BY AUTOMATED COUNT: 18.8 %
ERYTHROCYTE [DISTWIDTH] IN BLOOD BY AUTOMATED COUNT: 18.8 %
EST. GFR  (AFRICAN AMERICAN): 27 ML/MIN/1.73 M^2
EST. GFR  (NON AFRICAN AMERICAN): 23.4 ML/MIN/1.73 M^2
FACT X PPP CHRO-ACNC: 0.11 IU/ML
FACT X PPP CHRO-ACNC: 0.17 IU/ML
FACT X PPP CHRO-ACNC: 0.18 IU/ML
GLUCOSE SERPL-MCNC: 139 MG/DL
HCT VFR BLD AUTO: 23 %
HCT VFR BLD AUTO: 23 %
HCT VFR BLD AUTO: 26.9 %
HGB BLD-MCNC: 7 G/DL
HGB BLD-MCNC: 7 G/DL
HGB BLD-MCNC: 8.4 G/DL
IMM GRANULOCYTES # BLD AUTO: 0.07 K/UL
IMM GRANULOCYTES # BLD AUTO: 0.07 K/UL
IMM GRANULOCYTES # BLD AUTO: 0.08 K/UL
IMM GRANULOCYTES NFR BLD AUTO: 0.7 %
IMM GRANULOCYTES NFR BLD AUTO: 0.7 %
IMM GRANULOCYTES NFR BLD AUTO: 0.9 %
LYMPHOCYTES # BLD AUTO: 0.8 K/UL
LYMPHOCYTES # BLD AUTO: 0.8 K/UL
LYMPHOCYTES # BLD AUTO: 0.9 K/UL
LYMPHOCYTES NFR BLD: 8.7 %
LYMPHOCYTES NFR BLD: 8.7 %
LYMPHOCYTES NFR BLD: 9.7 %
MAGNESIUM SERPL-MCNC: 2.1 MG/DL
MCH RBC QN AUTO: 26.8 PG
MCH RBC QN AUTO: 26.8 PG
MCH RBC QN AUTO: 27.3 PG
MCHC RBC AUTO-ENTMCNC: 30.4 G/DL
MCHC RBC AUTO-ENTMCNC: 30.4 G/DL
MCHC RBC AUTO-ENTMCNC: 31.2 G/DL
MCV RBC AUTO: 87 FL
MCV RBC AUTO: 88 FL
MCV RBC AUTO: 88 FL
MONOCYTES # BLD AUTO: 0.7 K/UL
MONOCYTES # BLD AUTO: 1 K/UL
MONOCYTES # BLD AUTO: 1 K/UL
MONOCYTES NFR BLD: 10.1 %
MONOCYTES NFR BLD: 10.1 %
MONOCYTES NFR BLD: 7.4 %
NEUTROPHILS # BLD AUTO: 6.7 K/UL
NEUTROPHILS # BLD AUTO: 7.2 K/UL
NEUTROPHILS # BLD AUTO: 7.2 K/UL
NEUTROPHILS NFR BLD: 76.3 %
NEUTROPHILS NFR BLD: 76.3 %
NEUTROPHILS NFR BLD: 76.4 %
NRBC BLD-RTO: 0 /100 WBC
OB PNL STL: NEGATIVE
PHOSPHATE SERPL-MCNC: 2.7 MG/DL
PLATELET # BLD AUTO: 255 K/UL
PLATELET # BLD AUTO: 255 K/UL
PLATELET # BLD AUTO: 259 K/UL
PMV BLD AUTO: 11.4 FL
PMV BLD AUTO: 12.4 FL
PMV BLD AUTO: 12.4 FL
POCT GLUCOSE: 143 MG/DL (ref 70–110)
POCT GLUCOSE: 148 MG/DL (ref 70–110)
POTASSIUM SERPL-SCNC: 3.6 MMOL/L
PROT SERPL-MCNC: 6 G/DL
RBC # BLD AUTO: 2.61 M/UL
RBC # BLD AUTO: 2.61 M/UL
RBC # BLD AUTO: 3.08 M/UL
SODIUM SERPL-SCNC: 141 MMOL/L
TRANS ERYTHROCYTES VOL PATIENT: NORMAL ML
TRANS ERYTHROCYTES VOL PATIENT: NORMAL ML
WBC # BLD AUTO: 8.74 K/UL
WBC # BLD AUTO: 9.47 K/UL
WBC # BLD AUTO: 9.47 K/UL

## 2018-05-26 PROCEDURE — 80053 COMPREHEN METABOLIC PANEL: CPT

## 2018-05-26 PROCEDURE — 94640 AIRWAY INHALATION TREATMENT: CPT

## 2018-05-26 PROCEDURE — 99900035 HC TECH TIME PER 15 MIN (STAT)

## 2018-05-26 PROCEDURE — 25000003 PHARM REV CODE 250: Performed by: STUDENT IN AN ORGANIZED HEALTH CARE EDUCATION/TRAINING PROGRAM

## 2018-05-26 PROCEDURE — 83735 ASSAY OF MAGNESIUM: CPT

## 2018-05-26 PROCEDURE — 36430 TRANSFUSION BLD/BLD COMPNT: CPT

## 2018-05-26 PROCEDURE — 27000221 HC OXYGEN, UP TO 24 HOURS

## 2018-05-26 PROCEDURE — 86920 COMPATIBILITY TEST SPIN: CPT

## 2018-05-26 PROCEDURE — 85520 HEPARIN ASSAY: CPT

## 2018-05-26 PROCEDURE — 94664 DEMO&/EVAL PT USE INHALER: CPT

## 2018-05-26 PROCEDURE — 85025 COMPLETE CBC W/AUTO DIFF WBC: CPT | Mod: 91

## 2018-05-26 PROCEDURE — 63600175 PHARM REV CODE 636 W HCPCS: Performed by: STUDENT IN AN ORGANIZED HEALTH CARE EDUCATION/TRAINING PROGRAM

## 2018-05-26 PROCEDURE — 94799 UNLISTED PULMONARY SVC/PX: CPT

## 2018-05-26 PROCEDURE — 84100 ASSAY OF PHOSPHORUS: CPT

## 2018-05-26 PROCEDURE — 97110 THERAPEUTIC EXERCISES: CPT

## 2018-05-26 PROCEDURE — 82272 OCCULT BLD FECES 1-3 TESTS: CPT

## 2018-05-26 PROCEDURE — 36415 COLL VENOUS BLD VENIPUNCTURE: CPT

## 2018-05-26 PROCEDURE — 86850 RBC ANTIBODY SCREEN: CPT

## 2018-05-26 PROCEDURE — 20600001 HC STEP DOWN PRIVATE ROOM

## 2018-05-26 PROCEDURE — 87449 NOS EACH ORGANISM AG IA: CPT

## 2018-05-26 PROCEDURE — 85520 HEPARIN ASSAY: CPT | Mod: 91

## 2018-05-26 PROCEDURE — 94761 N-INVAS EAR/PLS OXIMETRY MLT: CPT

## 2018-05-26 PROCEDURE — 63600175 PHARM REV CODE 636 W HCPCS: Performed by: SURGERY

## 2018-05-26 PROCEDURE — 25000242 PHARM REV CODE 250 ALT 637 W/ HCPCS: Performed by: STUDENT IN AN ORGANIZED HEALTH CARE EDUCATION/TRAINING PROGRAM

## 2018-05-26 PROCEDURE — 97530 THERAPEUTIC ACTIVITIES: CPT

## 2018-05-26 PROCEDURE — P9021 RED BLOOD CELLS UNIT: HCPCS

## 2018-05-26 RX ORDER — FUROSEMIDE 10 MG/ML
20 INJECTION INTRAMUSCULAR; INTRAVENOUS ONCE
Status: COMPLETED | OUTPATIENT
Start: 2018-05-26 | End: 2018-05-26

## 2018-05-26 RX ORDER — HYDROCODONE BITARTRATE AND ACETAMINOPHEN 500; 5 MG/1; MG/1
TABLET ORAL
Status: DISCONTINUED | OUTPATIENT
Start: 2018-05-26 | End: 2018-05-28

## 2018-05-26 RX ADMIN — PANTOPRAZOLE SODIUM 40 MG: 40 TABLET, DELAYED RELEASE ORAL at 09:05

## 2018-05-26 RX ADMIN — HYDRALAZINE HYDROCHLORIDE AND ISOSORBIDE DINITRATE 2 TABLET: 37.5; 2 TABLET, FILM COATED ORAL at 09:05

## 2018-05-26 RX ADMIN — MEMANTINE HYDROCHLORIDE 10 MG: 5 TABLET ORAL at 08:05

## 2018-05-26 RX ADMIN — IPRATROPIUM BROMIDE AND ALBUTEROL SULFATE 3 ML: .5; 3 SOLUTION RESPIRATORY (INHALATION) at 08:05

## 2018-05-26 RX ADMIN — HEPARIN SODIUM 30.98 UNITS/KG/HR: 10000 INJECTION, SOLUTION INTRAVENOUS at 11:05

## 2018-05-26 RX ADMIN — PROMETHAZINE HYDROCHLORIDE 6.25 MG: 25 INJECTION INTRAMUSCULAR; INTRAVENOUS at 10:05

## 2018-05-26 RX ADMIN — FERROUS SULFATE TAB EC 325 MG (65 MG FE EQUIVALENT) 325 MG: 325 (65 FE) TABLET DELAYED RESPONSE at 08:05

## 2018-05-26 RX ADMIN — ONDANSETRON 8 MG: 8 TABLET, ORALLY DISINTEGRATING ORAL at 07:05

## 2018-05-26 RX ADMIN — CLONIDINE HYDROCHLORIDE 0.3 MG: 0.1 TABLET ORAL at 03:05

## 2018-05-26 RX ADMIN — HEPARIN SODIUM 22.1 UNITS/KG/HR: 10000 INJECTION, SOLUTION INTRAVENOUS at 05:05

## 2018-05-26 RX ADMIN — FUROSEMIDE 20 MG: 10 INJECTION, SOLUTION INTRAVENOUS at 03:05

## 2018-05-26 RX ADMIN — MEMANTINE HYDROCHLORIDE 10 MG: 5 TABLET ORAL at 09:05

## 2018-05-26 RX ADMIN — SODIUM BICARBONATE 650 MG TABLET 650 MG: at 09:05

## 2018-05-26 RX ADMIN — TAMSULOSIN HYDROCHLORIDE 0.4 MG: 0.4 CAPSULE ORAL at 09:05

## 2018-05-26 RX ADMIN — POTASSIUM PHOSPHATE, MONOBASIC AND POTASSIUM PHOSPHATE, DIBASIC 30 MMOL: 224; 236 INJECTION, SOLUTION, CONCENTRATE INTRAVENOUS at 09:05

## 2018-05-26 RX ADMIN — DUTASTERIDE 0.5 MG: 0.5 CAPSULE, LIQUID FILLED ORAL at 09:05

## 2018-05-26 RX ADMIN — GUAIFENESIN 600 MG: 600 TABLET, EXTENDED RELEASE ORAL at 09:05

## 2018-05-26 RX ADMIN — HYDRALAZINE HYDROCHLORIDE AND ISOSORBIDE DINITRATE 2 TABLET: 37.5; 2 TABLET, FILM COATED ORAL at 08:05

## 2018-05-26 RX ADMIN — HYDRALAZINE HYDROCHLORIDE AND ISOSORBIDE DINITRATE 2 TABLET: 37.5; 2 TABLET, FILM COATED ORAL at 03:05

## 2018-05-26 RX ADMIN — IPRATROPIUM BROMIDE AND ALBUTEROL SULFATE 3 ML: .5; 3 SOLUTION RESPIRATORY (INHALATION) at 04:05

## 2018-05-26 RX ADMIN — HEPARIN SODIUM 23.34 UNITS/KG/HR: 10000 INJECTION, SOLUTION INTRAVENOUS at 03:05

## 2018-05-26 RX ADMIN — SODIUM BICARBONATE 650 MG TABLET 650 MG: at 08:05

## 2018-05-26 RX ADMIN — HEPARIN SODIUM 22.08 UNITS/KG/HR: 10000 INJECTION, SOLUTION INTRAVENOUS at 11:05

## 2018-05-26 RX ADMIN — CLONIDINE HYDROCHLORIDE 0.3 MG: 0.1 TABLET ORAL at 09:05

## 2018-05-26 RX ADMIN — SODIUM BICARBONATE 650 MG TABLET 650 MG: at 03:05

## 2018-05-26 RX ADMIN — HYDROMORPHONE HYDROCHLORIDE 1 MG: 1 INJECTION, SOLUTION INTRAMUSCULAR; INTRAVENOUS; SUBCUTANEOUS at 09:05

## 2018-05-26 RX ADMIN — IPRATROPIUM BROMIDE AND ALBUTEROL SULFATE 3 ML: .5; 3 SOLUTION RESPIRATORY (INHALATION) at 12:05

## 2018-05-26 RX ADMIN — CARVEDILOL 25 MG: 25 TABLET, FILM COATED ORAL at 09:05

## 2018-05-26 RX ADMIN — CARVEDILOL 25 MG: 25 TABLET, FILM COATED ORAL at 05:05

## 2018-05-26 RX ADMIN — GUAIFENESIN 600 MG: 600 TABLET, EXTENDED RELEASE ORAL at 08:05

## 2018-05-26 RX ADMIN — HYDROMORPHONE HYDROCHLORIDE 1 MG: 1 INJECTION, SOLUTION INTRAMUSCULAR; INTRAVENOUS; SUBCUTANEOUS at 02:05

## 2018-05-26 RX ADMIN — CLONIDINE HYDROCHLORIDE 0.3 MG: 0.1 TABLET ORAL at 08:05

## 2018-05-26 NOTE — PT/OT/SLP PROGRESS
Physical Therapy Treatment    Patient Name:  Jose Cuadra   MRN:  [id number]    Recommendations:     Discharge Recommendations:  nursing facility, skilled   Discharge Equipment Recommendations: none   Barriers to discharge: Inaccessible home and Decreased caregiver support    Assessment:     Jose Cuadra is a 82 y.o. male admitted with a medical diagnosis of Adrenal tumor.  He presents with the following impairments/functional limitations:  weakness, impaired endurance, impaired self care skills, impaired functional mobilty, impaired balance, edema, impaired skin, impaired cardiopulmonary response to activity . Treatment was limited to bed activity only per RN, due to diarrhea. Attempted to transfer into sitting position, but patient had an accident.    Rehab Prognosis:  fair; patient would benefit from acute skilled PT services to address these deficits and reach maximum level of function.      Recent Surgery: Procedure(s) (LRB):  ADRENALECTOMY (Left)  INSERTION-TUBE-GASTROSTOMY (N/A) 5 Days Post-Op    Plan:     During this hospitalization, patient to be seen 4 x/week to address the above listed problems via gait training, therapeutic activities, therapeutic exercises, neuromuscular re-education  · Plan of Care Expires:  06/18/18   Plan of Care Reviewed with: patient    Subjective     Communicated with RN prior to session.  Patient found with HOB elevated upon PT entry to room, agreeable to treatment.      Chief Complaint: thirsty  Patient comments/goals: to go home  Pain/Comfort:  · Pain Rating 1: 0/10  · Location 1: abdomen  · Pain Addressed 1: Pre-medicate for activity, Reposition, Distraction, Cessation of Activity  · Pain Rating Post-Intervention 1: 0/10    Patients cultural, spiritual, Samaritan conflicts given the current situation: None noted    Objective:     Patient found with: PICC line, pulse ox (continuous), SCD, telemetry, peripheral IV     General Precautions: Standard, fall, hearing impaired,  vision impaired   Orthopedic Precautions:N/A   Braces: N/A     Functional Mobility:  · Bed Mobility:     · Scooting: total assistance and of 2 persons  · Supine to Sit: moderate assistance  · Sit to Supine: moderate assistance      AM-PAC 6 CLICK MOBILITY  Turning over in bed (including adjusting bedclothes, sheets and blankets)?: 3  Sitting down on and standing up from a chair with arms (e.g., wheelchair, bedside commode, etc.): 2  Moving from lying on back to sitting on the side of the bed?: 2  Moving to and from a bed to a chair (including a wheelchair)?: 2  Need to walk in hospital room?: 2  Climbing 3-5 steps with a railing?: 1  Total Score: 12       Therapeutic Activities and Exercises:   B LE GISELLEOM x 30 reps on all available planes of motion. assisted RN repositioning in bed for comfort.    Patient left HOB elevated with all lines intact, call button in reach and bed alarm on..    GOALS:    Physical Therapy Goals        Problem: Physical Therapy Goal    Goal Priority Disciplines Outcome Goal Variances Interventions   Physical Therapy Goal     PT/OT, PT Ongoing (interventions implemented as appropriate)     Description:  Goals to be met by: 18     Patient will increase functional independence with mobility by performin. Supine to sit with Stand-by Assistance  2. Sit to supine with Stand-by Assistance  3. Sit to stand transfer with Stand-by Assistance  4. Gait  x >200 feet with Stand-by Assistance using Rolling Walker or LRAD  5. Lower extremity exercise program x20 reps per handout, with supervision                       Time Tracking:     PT Received On: 18  PT Start Time: 1017     PT Stop Time: 1042  PT Total Time (min): 25 min     Billable Minutes: Therapeutic Activity 10 and Therapeutic Exercise 15    Treatment Type: Treatment  PT/PTA: PTA     PTA Visit Number: Sung Carrizales, PTA  2018

## 2018-05-26 NOTE — PROGRESS NOTES
"[facility]  General Surgery  Progress Note    Subjective:     History of Present Illness:  Patient is an 81 yo M with history of dementia, RCC, possible colon CA, CKD 4, Afib with pacemaker, CHF with EF 36%, and a large abdominal (likely adrenal) mass who presented to OSH 5/2/18 with pneumonia and during his admission was found to have a large adrenal mass that was enlarged from previous CT scan, and he was transferred here for further workup. A CT scan from 2016 showed a similar sized mass. He underwent biopsy which showed no obvious malignancy, mostly necrotic tissue. He has completed his course of abx for pneumonia and his SOA has resolved.    When asked, he states the mass has been present for years. He denies any nausea, vomiting, weight loss, fevers, or chills. He continues to have bowel movements, with bowel incontinence (he says that's his baseline). He has been off anticoagulation despite being in afib. He has been having issues urinating.    He has undergone right nephrectomy and colon resection of 22 cm "maybe for cancer".    Post-Op Info:  Procedure(s) (LRB):  ADRENALECTOMY (Left)  INSERTION-TUBE-GASTROSTOMY (N/A)   5 Days Post-Op     Interval History: No acute events overnight. Tolerating CLD with G tube clamped, no nausea/vomiting. Had several loose stools per nursing overnight. Minimal abdominal pain this morning. H/H low 7.0/23.0.    Medications:  Continuous Infusions:   sodium chloride 0.9% 50 mL/hr at 05/25/18 0533    heparin (porcine) in D5W 22.1 Units/kg/hr (05/26/18 0554)     Scheduled Meds:   albuterol-ipratropium  3 mL Nebulization Q4H WAKE    carvedilol  25 mg Oral BID WM    cloNIDine  0.3 mg Oral TID    dutasteride  0.5 mg Oral Daily    ferrous sulfate  325 mg Oral BID    guaiFENesin  600 mg Oral BID    isosorbide-hydrALAZINE 20-37.5 mg  2 tablet Oral TID    memantine  10 mg Oral BID    pantoprazole  40 mg Oral Daily    potassium phosphate IVPB  30 mmol " Intravenous Once    sodium bicarbonate  1 tablet Oral TID    tamsulosin  0.4 mg Oral Daily     PRN Meds:sodium chloride, dextrose 50%, dextrose 50%, glucagon (human recombinant), glucose, glucose, heparin (PORCINE), heparin (PORCINE), HYDROmorphone, HYDROmorphone, insulin aspart U-100, labetalol, naloxone, ondansetron, promethazine (PHENERGAN) IVPB, sodium chloride 0.9%     Review of patient's allergies indicates:  No Known Allergies  Objective:     Vital Signs (Most Recent):  Temp: 96.2 °F (35.7 °C) (05/26/18 0721)  Pulse: 70 (05/26/18 0850)  Resp: 16 (05/26/18 0850)  BP: (!) 144/70 (05/26/18 0721)  SpO2: (!) 94 % (05/26/18 0850) Vital Signs (24h Range):  Temp:  [16 °F (-8.9 °C)-98.2 °F (36.8 °C)] 96.2 °F (35.7 °C)  Pulse:  [69-70] 70  Resp:  [16-20] 16  SpO2:  [92 %-98 %] 94 %  BP: (132-149)/(64-71) 144/70     Weight: 106.5 kg (234 lb 13 oz)  Body mass index is 29.35 kg/m².    Intake/Output - Last 3 Shifts       05/24 0700 - 05/25 0659 05/25 0700 - 05/26 0659 05/26 0700 - 05/27 0659    P.O. 390 850     I.V. (mL/kg) 1306.7 (12.3) 561.6 (5.3)     NG/GT  20     Total Intake(mL/kg) 1696.7 (15.9) 1431.6 (13.4)     Urine (mL/kg/hr) 870 (0.3) 800 (0.3)     Emesis/NG output 0 (0)      Drains 0 (0)      Other 0 (0)      Stool 0 (0)      Blood 0 (0)      Total Output 870 800      Net +826.7 +631.6             Urine Occurrence 0 x      Stool Occurrence 0 x      Emesis Occurrence 0 x            Physical Exam   Constitutional: He is oriented to person, place, and time. He appears well-developed and well-nourished. No distress.   HENT:   Head: Normocephalic and atraumatic.   Cardiovascular: Normal rate and regular rhythm.    Pulmonary/Chest: Effort normal. No respiratory distress.   Abdominal:   Soft, non-tender this morning  Incision - clean, dry and intact  G-tube clamped   Neurological: He is alert and oriented to person, place, and time.   Skin: Skin is warm and dry.   Psychiatric: He has a normal mood and affect. His  behavior is normal.   Nursing note and vitals reviewed.      Significant Labs:  CBC:     Recent Labs  Lab 05/26/18  0409   WBC 9.47  9.47   RBC 2.61*  2.61*   HGB 7.0*  7.0*   HCT 23.0*  23.0*     255   MCV 88  88   MCH 26.8*  26.8*   MCHC 30.4*  30.4*     BMP:     Recent Labs  Lab 05/26/18  0408 05/26/18  0409   *  --      --    K 3.6  --    *  --    CO2 23  --    BUN 43*  --    CREATININE 2.5*  --    CALCIUM 9.1  --    MG  --  2.1     CMP:     Recent Labs  Lab 05/26/18  0408   *   CALCIUM 9.1   ALBUMIN 1.6*   PROT 6.0      K 3.6   CO2 23   *   BUN 43*   CREATININE 2.5*   ALKPHOS 137*   ALT <5*   AST 12   BILITOT 1.0     LFTs:     Recent Labs  Lab 05/26/18  0408   ALT <5*   AST 12   ALKPHOS 137*   BILITOT 1.0   PROT 6.0   ALBUMIN 1.6*     Coagulation:     Recent Labs  Lab 05/21/18  1327   LABPROT 11.5   INR 1.1   APTT 23.3     Assessment/Plan:     * Adrenal tumor    Patient is 81 yo M who presents with large necrotic left adrenal mass, s/p adrenalectomy 5/21/18    - tolerating CLD, advance to regular diet  -IVF NS @ 50  -keep g-tube clamped  -pain/nausea meds PRN  -GI prophylaxis  -PT/OT          Acute respiratory insufficiency, postoperative    Aggressive pulmonary toilet  -IS, acapella, CPT q4  -duonebs        Acute blood loss anemia    Received 1prbc 5/22/18  Continue to monitor        Dementia    Home meds  -memantine        Urinary retention    Home meds  -dutasteride  -calabrese        Non-ischemic cardiomyopathy    Home meds  -amlodipine, clonidine, carvedilol, bidil          Type 2 diabetes mellitus with diabetic peripheral angiopathy without gangrene, without long-term current use of insulin    SSI, accuchecks        Microcytic anemia    - iron supplement  - transfuse 2 units pRBCs today with lasix in between        Chronic atrial fibrillation    Continues on heparin claudy Roper MD  General Surgery  [facility]

## 2018-05-26 NOTE — ASSESSMENT & PLAN NOTE
Patient is 81 yo M who presents with large necrotic left adrenal mass, s/p adrenalectomy 5/21/18    - tolerating CLD, advance to regular diet  -IVF NS @ 50  -keep g-tube clamped  -pain/nausea meds PRN  -GI prophylaxis  -PT/OT

## 2018-05-26 NOTE — SUBJECTIVE & OBJECTIVE
Interval History: No acute events overnight. Tolerating CLD with G tube clamped, no nausea/vomiting. Had several loose stools per nursing overnight. Minimal abdominal pain this morning. H/H low 7.0/23.0.    Medications:  Continuous Infusions:   sodium chloride 0.9% 50 mL/hr at 05/25/18 0533    heparin (porcine) in D5W 22.1 Units/kg/hr (05/26/18 0554)     Scheduled Meds:   albuterol-ipratropium  3 mL Nebulization Q4H WAKE    carvedilol  25 mg Oral BID WM    cloNIDine  0.3 mg Oral TID    dutasteride  0.5 mg Oral Daily    ferrous sulfate  325 mg Oral BID    guaiFENesin  600 mg Oral BID    isosorbide-hydrALAZINE 20-37.5 mg  2 tablet Oral TID    memantine  10 mg Oral BID    pantoprazole  40 mg Oral Daily    potassium phosphate IVPB  30 mmol Intravenous Once    sodium bicarbonate  1 tablet Oral TID    tamsulosin  0.4 mg Oral Daily     PRN Meds:sodium chloride, dextrose 50%, dextrose 50%, glucagon (human recombinant), glucose, glucose, heparin (PORCINE), heparin (PORCINE), HYDROmorphone, HYDROmorphone, insulin aspart U-100, labetalol, naloxone, ondansetron, promethazine (PHENERGAN) IVPB, sodium chloride 0.9%     Review of patient's allergies indicates:  No Known Allergies  Objective:     Vital Signs (Most Recent):  Temp: 96.2 °F (35.7 °C) (05/26/18 0721)  Pulse: 70 (05/26/18 0850)  Resp: 16 (05/26/18 0850)  BP: (!) 144/70 (05/26/18 0721)  SpO2: (!) 94 % (05/26/18 0850) Vital Signs (24h Range):  Temp:  [16 °F (-8.9 °C)-98.2 °F (36.8 °C)] 96.2 °F (35.7 °C)  Pulse:  [69-70] 70  Resp:  [16-20] 16  SpO2:  [92 %-98 %] 94 %  BP: (132-149)/(64-71) 144/70     Weight: 106.5 kg (234 lb 13 oz)  Body mass index is 29.35 kg/m².    Intake/Output - Last 3 Shifts       05/24 0700 - 05/25 0659 05/25 0700 - 05/26 0659 05/26 0700 - 05/27 0659    P.O. 390 850     I.V. (mL/kg) 1306.7 (12.3) 561.6 (5.3)     NG/GT  20     Total Intake(mL/kg) 1696.7 (15.9) 1431.6 (13.4)     Urine (mL/kg/hr) 870 (0.3) 800 (0.3)     Emesis/NG output 0  (0)      Drains 0 (0)      Other 0 (0)      Stool 0 (0)      Blood 0 (0)      Total Output 870 800      Net +826.7 +631.6             Urine Occurrence 0 x      Stool Occurrence 0 x      Emesis Occurrence 0 x            Physical Exam   Constitutional: He is oriented to person, place, and time. He appears well-developed and well-nourished. No distress.   HENT:   Head: Normocephalic and atraumatic.   Cardiovascular: Normal rate and regular rhythm.    Pulmonary/Chest: Effort normal. No respiratory distress.   Abdominal:   Soft, non-tender this morning  Incision - clean, dry and intact  G-tube clamped   Neurological: He is alert and oriented to person, place, and time.   Skin: Skin is warm and dry.   Psychiatric: He has a normal mood and affect. His behavior is normal.   Nursing note and vitals reviewed.      Significant Labs:  CBC:     Recent Labs  Lab 05/26/18  0409   WBC 9.47  9.47   RBC 2.61*  2.61*   HGB 7.0*  7.0*   HCT 23.0*  23.0*     255   MCV 88  88   MCH 26.8*  26.8*   MCHC 30.4*  30.4*     BMP:     Recent Labs  Lab 05/26/18  0408 05/26/18  0409   *  --      --    K 3.6  --    *  --    CO2 23  --    BUN 43*  --    CREATININE 2.5*  --    CALCIUM 9.1  --    MG  --  2.1     CMP:     Recent Labs  Lab 05/26/18  0408   *   CALCIUM 9.1   ALBUMIN 1.6*   PROT 6.0      K 3.6   CO2 23   *   BUN 43*   CREATININE 2.5*   ALKPHOS 137*   ALT <5*   AST 12   BILITOT 1.0     LFTs:     Recent Labs  Lab 05/26/18  0408   ALT <5*   AST 12   ALKPHOS 137*   BILITOT 1.0   PROT 6.0   ALBUMIN 1.6*     Coagulation:     Recent Labs  Lab 05/21/18  1327   LABPROT 11.5   INR 1.1   APTT 23.3

## 2018-05-26 NOTE — PLAN OF CARE
Problem: Patient Care Overview  Goal: Plan of Care Review  Outcome: Ongoing (interventions implemented as appropriate)  Plan of care reviewed with patient. Patient AOX3. Forgetful, dementia. VSS, RA. ML DB FLORENCE no drainage. G - tube clamped no signs of discomfort Nausea or vomiting. Carney replaced for retention. Dark concentrated urine. X2 assist. Loose dark green stool. Incont of bowels. 2L NC maintaining 95 %. Tolerating clear liquid diet. Call light in reach. Patient reoriented often. Frequent rounds for safety. Bed alarm on and functional. RN will continue to monitor

## 2018-05-26 NOTE — PLAN OF CARE
Problem: Physical Therapy Goal  Goal: Physical Therapy Goal  Goals to be met by: 18     Patient will increase functional independence with mobility by performin. Supine to sit with Stand-by Assistance  2. Sit to supine with Stand-by Assistance  3. Sit to stand transfer with Stand-by Assistance  4. Gait  x >200 feet with Stand-by Assistance using Rolling Walker or LRAD  5. Lower extremity exercise program x20 reps per handout, with supervision      Outcome: Ongoing (interventions implemented as appropriate)  Progress was limited today due to loose bowels.

## 2018-05-26 NOTE — PLAN OF CARE
Problem: Patient Care Overview  Goal: Plan of Care Review  Outcome: Ongoing (interventions implemented as appropriate)  Patient A/OX3, is intermittently confused over situation. Care plan reviewed with patient-verbalizes understanding. VSS. Patient not eating much, will drink throughout shift. No reports of pain. Pain well managed with medication. Incision clean, dry, and intact. GT intact and patent. Patient has had 5+ liquid BMs, CDiff stool sample still pending result. Patient has excoriated buttocks; barrier cream appplied w every change of pads/brief after BMs. Patient on Tele-NSR.  Patient remains free of falls. Patient states no other needs at this time. WCTM.

## 2018-05-27 LAB
ALBUMIN SERPL BCP-MCNC: 1.6 G/DL
ALP SERPL-CCNC: 145 U/L
ALT SERPL W/O P-5'-P-CCNC: 5 U/L
ANION GAP SERPL CALC-SCNC: 8 MMOL/L
AST SERPL-CCNC: 15 U/L
BASOPHILS # BLD AUTO: 0.08 K/UL
BASOPHILS NFR BLD: 1 %
BILIRUB SERPL-MCNC: 1 MG/DL
BUN SERPL-MCNC: 37 MG/DL
CALCIUM SERPL-MCNC: 9 MG/DL
CHLORIDE SERPL-SCNC: 109 MMOL/L
CO2 SERPL-SCNC: 21 MMOL/L
CREAT SERPL-MCNC: 2.2 MG/DL
DIFFERENTIAL METHOD: ABNORMAL
EOSINOPHIL # BLD AUTO: 0.5 K/UL
EOSINOPHIL NFR BLD: 6 %
ERYTHROCYTE [DISTWIDTH] IN BLOOD BY AUTOMATED COUNT: 17.8 %
EST. GFR  (AFRICAN AMERICAN): 31.5 ML/MIN/1.73 M^2
EST. GFR  (NON AFRICAN AMERICAN): 27.3 ML/MIN/1.73 M^2
FACT X PPP CHRO-ACNC: 0.38 IU/ML
GLUCOSE SERPL-MCNC: 123 MG/DL
HCT VFR BLD AUTO: 29.6 %
HGB BLD-MCNC: 9.2 G/DL
IMM GRANULOCYTES # BLD AUTO: 0.1 K/UL
IMM GRANULOCYTES NFR BLD AUTO: 1.2 %
LYMPHOCYTES # BLD AUTO: 1 K/UL
LYMPHOCYTES NFR BLD: 12.2 %
MAGNESIUM SERPL-MCNC: 1.7 MG/DL
MCH RBC QN AUTO: 27 PG
MCHC RBC AUTO-ENTMCNC: 31.1 G/DL
MCV RBC AUTO: 87 FL
MONOCYTES # BLD AUTO: 0.6 K/UL
MONOCYTES NFR BLD: 7.9 %
NEUTROPHILS # BLD AUTO: 5.8 K/UL
NEUTROPHILS NFR BLD: 71.7 %
NRBC BLD-RTO: 0 /100 WBC
PHOSPHATE SERPL-MCNC: 2.8 MG/DL
PLATELET # BLD AUTO: 284 K/UL
PMV BLD AUTO: 13 FL
POCT GLUCOSE: 128 MG/DL (ref 70–110)
POCT GLUCOSE: 133 MG/DL (ref 70–110)
POCT GLUCOSE: 155 MG/DL (ref 70–110)
POCT GLUCOSE: 226 MG/DL (ref 70–110)
POTASSIUM SERPL-SCNC: 3.8 MMOL/L
PROT SERPL-MCNC: 6 G/DL
RBC # BLD AUTO: 3.41 M/UL
SODIUM SERPL-SCNC: 138 MMOL/L
TB INDURATION 48 - 72 HR READ: 0 MM
WBC # BLD AUTO: 8.1 K/UL

## 2018-05-27 PROCEDURE — 85025 COMPLETE CBC W/AUTO DIFF WBC: CPT

## 2018-05-27 PROCEDURE — 94640 AIRWAY INHALATION TREATMENT: CPT

## 2018-05-27 PROCEDURE — 63600175 PHARM REV CODE 636 W HCPCS: Performed by: STUDENT IN AN ORGANIZED HEALTH CARE EDUCATION/TRAINING PROGRAM

## 2018-05-27 PROCEDURE — 25000003 PHARM REV CODE 250: Performed by: STUDENT IN AN ORGANIZED HEALTH CARE EDUCATION/TRAINING PROGRAM

## 2018-05-27 PROCEDURE — 20600001 HC STEP DOWN PRIVATE ROOM

## 2018-05-27 PROCEDURE — 25000242 PHARM REV CODE 250 ALT 637 W/ HCPCS: Performed by: STUDENT IN AN ORGANIZED HEALTH CARE EDUCATION/TRAINING PROGRAM

## 2018-05-27 PROCEDURE — 94664 DEMO&/EVAL PT USE INHALER: CPT

## 2018-05-27 PROCEDURE — 99900035 HC TECH TIME PER 15 MIN (STAT)

## 2018-05-27 PROCEDURE — 83735 ASSAY OF MAGNESIUM: CPT

## 2018-05-27 PROCEDURE — 63600175 PHARM REV CODE 636 W HCPCS: Performed by: SURGERY

## 2018-05-27 PROCEDURE — 94668 MNPJ CHEST WALL SBSQ: CPT

## 2018-05-27 PROCEDURE — 84100 ASSAY OF PHOSPHORUS: CPT

## 2018-05-27 PROCEDURE — 27000221 HC OXYGEN, UP TO 24 HOURS

## 2018-05-27 PROCEDURE — 94761 N-INVAS EAR/PLS OXIMETRY MLT: CPT

## 2018-05-27 PROCEDURE — 85520 HEPARIN ASSAY: CPT

## 2018-05-27 PROCEDURE — 36415 COLL VENOUS BLD VENIPUNCTURE: CPT

## 2018-05-27 PROCEDURE — 80053 COMPREHEN METABOLIC PANEL: CPT

## 2018-05-27 RX ORDER — FUROSEMIDE 10 MG/ML
20 INJECTION INTRAMUSCULAR; INTRAVENOUS ONCE
Status: COMPLETED | OUTPATIENT
Start: 2018-05-27 | End: 2018-05-27

## 2018-05-27 RX ADMIN — IPRATROPIUM BROMIDE AND ALBUTEROL SULFATE 3 ML: .5; 3 SOLUTION RESPIRATORY (INHALATION) at 11:05

## 2018-05-27 RX ADMIN — HEPARIN SODIUM 27.14 UNITS/KG/HR: 10000 INJECTION, SOLUTION INTRAVENOUS at 07:05

## 2018-05-27 RX ADMIN — DUTASTERIDE 0.5 MG: 0.5 CAPSULE, LIQUID FILLED ORAL at 10:05

## 2018-05-27 RX ADMIN — CLONIDINE HYDROCHLORIDE 0.3 MG: 0.1 TABLET ORAL at 02:05

## 2018-05-27 RX ADMIN — SODIUM BICARBONATE 650 MG TABLET 650 MG: at 10:05

## 2018-05-27 RX ADMIN — HYDROMORPHONE HYDROCHLORIDE 1 MG: 1 INJECTION, SOLUTION INTRAMUSCULAR; INTRAVENOUS; SUBCUTANEOUS at 08:05

## 2018-05-27 RX ADMIN — HYDRALAZINE HYDROCHLORIDE AND ISOSORBIDE DINITRATE 2 TABLET: 37.5; 2 TABLET, FILM COATED ORAL at 10:05

## 2018-05-27 RX ADMIN — SODIUM BICARBONATE 650 MG TABLET 650 MG: at 02:05

## 2018-05-27 RX ADMIN — HYDRALAZINE HYDROCHLORIDE AND ISOSORBIDE DINITRATE 2 TABLET: 37.5; 2 TABLET, FILM COATED ORAL at 08:05

## 2018-05-27 RX ADMIN — POTASSIUM PHOSPHATE, MONOBASIC AND POTASSIUM PHOSPHATE, DIBASIC 20 MMOL: 224; 236 INJECTION, SOLUTION, CONCENTRATE INTRAVENOUS at 08:05

## 2018-05-27 RX ADMIN — ONDANSETRON 8 MG: 8 TABLET, ORALLY DISINTEGRATING ORAL at 03:05

## 2018-05-27 RX ADMIN — HYDRALAZINE HYDROCHLORIDE AND ISOSORBIDE DINITRATE 2 TABLET: 37.5; 2 TABLET, FILM COATED ORAL at 02:05

## 2018-05-27 RX ADMIN — TAMSULOSIN HYDROCHLORIDE 0.4 MG: 0.4 CAPSULE ORAL at 10:05

## 2018-05-27 RX ADMIN — HEPARIN SODIUM 27.14 UNITS/KG/HR: 10000 INJECTION, SOLUTION INTRAVENOUS at 05:05

## 2018-05-27 RX ADMIN — PANTOPRAZOLE SODIUM 40 MG: 40 TABLET, DELAYED RELEASE ORAL at 10:05

## 2018-05-27 RX ADMIN — FERROUS SULFATE TAB EC 325 MG (65 MG FE EQUIVALENT) 325 MG: 325 (65 FE) TABLET DELAYED RESPONSE at 10:05

## 2018-05-27 RX ADMIN — CARVEDILOL 25 MG: 25 TABLET, FILM COATED ORAL at 05:05

## 2018-05-27 RX ADMIN — MAGNESIUM SULFATE HEPTAHYDRATE 3 G: 500 INJECTION, SOLUTION INTRAMUSCULAR; INTRAVENOUS at 08:05

## 2018-05-27 RX ADMIN — HYDROMORPHONE HYDROCHLORIDE 0.5 MG: 1 INJECTION, SOLUTION INTRAMUSCULAR; INTRAVENOUS; SUBCUTANEOUS at 01:05

## 2018-05-27 RX ADMIN — IPRATROPIUM BROMIDE AND ALBUTEROL SULFATE 3 ML: .5; 3 SOLUTION RESPIRATORY (INHALATION) at 08:05

## 2018-05-27 RX ADMIN — CLONIDINE HYDROCHLORIDE 0.3 MG: 0.1 TABLET ORAL at 08:05

## 2018-05-27 RX ADMIN — FERROUS SULFATE TAB EC 325 MG (65 MG FE EQUIVALENT) 325 MG: 325 (65 FE) TABLET DELAYED RESPONSE at 08:05

## 2018-05-27 RX ADMIN — MEMANTINE HYDROCHLORIDE 10 MG: 5 TABLET ORAL at 08:05

## 2018-05-27 RX ADMIN — HYDROMORPHONE HYDROCHLORIDE 0.5 MG: 1 INJECTION, SOLUTION INTRAMUSCULAR; INTRAVENOUS; SUBCUTANEOUS at 03:05

## 2018-05-27 RX ADMIN — INSULIN ASPART 2 UNITS: 100 INJECTION, SOLUTION INTRAVENOUS; SUBCUTANEOUS at 11:05

## 2018-05-27 RX ADMIN — IPRATROPIUM BROMIDE AND ALBUTEROL SULFATE 3 ML: .5; 3 SOLUTION RESPIRATORY (INHALATION) at 09:05

## 2018-05-27 RX ADMIN — GUAIFENESIN 600 MG: 600 TABLET, EXTENDED RELEASE ORAL at 08:05

## 2018-05-27 RX ADMIN — CARVEDILOL 25 MG: 25 TABLET, FILM COATED ORAL at 10:05

## 2018-05-27 RX ADMIN — MEMANTINE HYDROCHLORIDE 10 MG: 5 TABLET ORAL at 10:05

## 2018-05-27 RX ADMIN — SODIUM BICARBONATE 650 MG TABLET 650 MG: at 08:05

## 2018-05-27 RX ADMIN — FUROSEMIDE 20 MG: 10 INJECTION, SOLUTION INTRAVENOUS at 02:05

## 2018-05-27 RX ADMIN — HYDROMORPHONE HYDROCHLORIDE 0.5 MG: 1 INJECTION, SOLUTION INTRAMUSCULAR; INTRAVENOUS; SUBCUTANEOUS at 10:05

## 2018-05-27 RX ADMIN — GUAIFENESIN 600 MG: 600 TABLET, EXTENDED RELEASE ORAL at 10:05

## 2018-05-27 RX ADMIN — CLONIDINE HYDROCHLORIDE 0.3 MG: 0.1 TABLET ORAL at 10:05

## 2018-05-27 NOTE — SUBJECTIVE & OBJECTIVE
Interval History: Multiple loose bowel movements yesterday, c.diff negative. Tolerating regular diet with G tube clamped, no nausea/vomiting. Minimal abdominal pain this morning. H/H 9.2/29.6 after 2 units pRBCs yesterday. Appropriate response.    Medications:  Continuous Infusions:   sodium chloride 0.9% 50 mL/hr at 05/25/18 0533    heparin (porcine) in D5W 30.975 Units/kg/hr (05/26/18 2315)     Scheduled Meds:   albuterol-ipratropium  3 mL Nebulization Q4H WAKE    carvedilol  25 mg Oral BID WM    cloNIDine  0.3 mg Oral TID    dutasteride  0.5 mg Oral Daily    ferrous sulfate  325 mg Oral BID    guaiFENesin  600 mg Oral BID    isosorbide-hydrALAZINE 20-37.5 mg  2 tablet Oral TID    memantine  10 mg Oral BID    pantoprazole  40 mg Oral Daily    sodium bicarbonate  1 tablet Oral TID    tamsulosin  0.4 mg Oral Daily     PRN Meds:sodium chloride, sodium chloride, dextrose 50%, dextrose 50%, glucagon (human recombinant), glucose, glucose, heparin (PORCINE), heparin (PORCINE), HYDROmorphone, HYDROmorphone, insulin aspart U-100, labetalol, naloxone, ondansetron, promethazine (PHENERGAN) IVPB, sodium chloride 0.9%     Review of patient's allergies indicates:  No Known Allergies  Objective:     Vital Signs (Most Recent):  Temp: 97.6 °F (36.4 °C) (05/27/18 0459)  Pulse: 69 (05/27/18 0459)  Resp: 17 (05/27/18 0459)  BP: (!) 145/77 (05/27/18 0459)  SpO2: 96 % (05/27/18 0459) Vital Signs (24h Range):  Temp:  [96.2 °F (35.7 °C)-98.4 °F (36.9 °C)] 97.6 °F (36.4 °C)  Pulse:  [67-70] 69  Resp:  [16-18] 17  SpO2:  [69 %-97 %] 96 %  BP: (135-165)/(69-91) 145/77  Arterial Line BP: (141)/(72) 141/72     Weight: 106.5 kg (234 lb 13 oz)  Body mass index is 29.35 kg/m².    Intake/Output - Last 3 Shifts       05/25 0700 - 05/26 0659 05/26 0700 - 05/27 0659 05/27 0700 - 05/28 0659    P.O. 850 400     I.V. (mL/kg) 561.6 (5.3) 1252 (11.8)     Blood  232     NG/GT 20 20     IV Piggyback  500     Total Intake(mL/kg) 1431.6 (13.4)  2404 (22.6)     Urine (mL/kg/hr) 800 (0.3) 1575 (0.6)     Drains  0 (0)     Total Output 800 1575      Net +631.6 +829             Stool Occurrence  2 x           Physical Exam   Constitutional: He is oriented to person, place, and time. He appears well-developed and well-nourished. No distress.   HENT:   Head: Normocephalic and atraumatic.   Cardiovascular: Normal rate and regular rhythm.    Pulmonary/Chest: Effort normal. No respiratory distress.   Abdominal:   Soft, non-tender this morning  Incision - clean, dry and intact  G-tube clamped   Neurological: He is alert and oriented to person, place, and time.   Skin: Skin is warm and dry.   Psychiatric: He has a normal mood and affect. His behavior is normal.   Nursing note and vitals reviewed.      Significant Labs:  CBC:     Recent Labs  Lab 05/27/18  0447   WBC 8.10   RBC 3.41*   HGB 9.2*   HCT 29.6*      MCV 87   MCH 27.0   MCHC 31.1*     BMP:     Recent Labs  Lab 05/27/18  0447   *      K 3.8      CO2 21*   BUN 37*   CREATININE 2.2*   CALCIUM 9.0   MG 1.7     CMP:     Recent Labs  Lab 05/27/18  0447   *   CALCIUM 9.0   ALBUMIN 1.6*   PROT 6.0      K 3.8   CO2 21*      BUN 37*   CREATININE 2.2*   ALKPHOS 145*   ALT 5*   AST 15   BILITOT 1.0     LFTs:     Recent Labs  Lab 05/27/18  0447   ALT 5*   AST 15   ALKPHOS 145*   BILITOT 1.0   PROT 6.0   ALBUMIN 1.6*     Coagulation:     Recent Labs  Lab 05/21/18  1327   LABPROT 11.5   INR 1.1   APTT 23.3

## 2018-05-27 NOTE — PROGRESS NOTES
"[facility]  General Surgery  Progress Note    Subjective:     History of Present Illness:  Patient is an 81 yo M with history of dementia, RCC, possible colon CA, CKD 4, Afib with pacemaker, CHF with EF 36%, and a large abdominal (likely adrenal) mass who presented to OSH 5/2/18 with pneumonia and during his admission was found to have a large adrenal mass that was enlarged from previous CT scan, and he was transferred here for further workup. A CT scan from 2016 showed a similar sized mass. He underwent biopsy which showed no obvious malignancy, mostly necrotic tissue. He has completed his course of abx for pneumonia and his SOA has resolved.    When asked, he states the mass has been present for years. He denies any nausea, vomiting, weight loss, fevers, or chills. He continues to have bowel movements, with bowel incontinence (he says that's his baseline). He has been off anticoagulation despite being in afib. He has been having issues urinating.    He has undergone right nephrectomy and colon resection of 22 cm "maybe for cancer".    Post-Op Info:  Procedure(s) (LRB):  ADRENALECTOMY (Left)  INSERTION-TUBE-GASTROSTOMY (N/A)   6 Days Post-Op     Interval History: Multiple loose bowel movements yesterday, c.diff negative. Tolerating regular diet with G tube clamped, no nausea/vomiting. Minimal abdominal pain this morning. H/H 9.2/29.6 after 2 units pRBCs yesterday. Appropriate response.    Medications:  Continuous Infusions:   sodium chloride 0.9% 50 mL/hr at 05/25/18 0533    heparin (porcine) in D5W 30.975 Units/kg/hr (05/26/18 2315)     Scheduled Meds:   albuterol-ipratropium  3 mL Nebulization Q4H WAKE    carvedilol  25 mg Oral BID WM    cloNIDine  0.3 mg Oral TID    dutasteride  0.5 mg Oral Daily    ferrous sulfate  325 mg Oral BID    guaiFENesin  600 mg Oral BID    isosorbide-hydrALAZINE 20-37.5 mg  2 tablet Oral TID    memantine  10 mg Oral BID    pantoprazole  40 mg Oral Daily "    sodium bicarbonate  1 tablet Oral TID    tamsulosin  0.4 mg Oral Daily     PRN Meds:sodium chloride, sodium chloride, dextrose 50%, dextrose 50%, glucagon (human recombinant), glucose, glucose, heparin (PORCINE), heparin (PORCINE), HYDROmorphone, HYDROmorphone, insulin aspart U-100, labetalol, naloxone, ondansetron, promethazine (PHENERGAN) IVPB, sodium chloride 0.9%     Review of patient's allergies indicates:  No Known Allergies  Objective:     Vital Signs (Most Recent):  Temp: 97.6 °F (36.4 °C) (05/27/18 0459)  Pulse: 69 (05/27/18 0459)  Resp: 17 (05/27/18 0459)  BP: (!) 145/77 (05/27/18 0459)  SpO2: 96 % (05/27/18 0459) Vital Signs (24h Range):  Temp:  [96.2 °F (35.7 °C)-98.4 °F (36.9 °C)] 97.6 °F (36.4 °C)  Pulse:  [67-70] 69  Resp:  [16-18] 17  SpO2:  [69 %-97 %] 96 %  BP: (135-165)/(69-91) 145/77  Arterial Line BP: (141)/(72) 141/72     Weight: 106.5 kg (234 lb 13 oz)  Body mass index is 29.35 kg/m².    Intake/Output - Last 3 Shifts       05/25 0700 - 05/26 0659 05/26 0700 - 05/27 0659 05/27 0700 - 05/28 0659    P.O. 850 400     I.V. (mL/kg) 561.6 (5.3) 1252 (11.8)     Blood  232     NG/GT 20 20     IV Piggyback  500     Total Intake(mL/kg) 1431.6 (13.4) 2404 (22.6)     Urine (mL/kg/hr) 800 (0.3) 1575 (0.6)     Drains  0 (0)     Total Output 800 1575      Net +631.6 +829             Stool Occurrence  2 x           Physical Exam   Constitutional: He is oriented to person, place, and time. He appears well-developed and well-nourished. No distress.   HENT:   Head: Normocephalic and atraumatic.   Cardiovascular: Normal rate and regular rhythm.    Pulmonary/Chest: Effort normal. No respiratory distress.   Abdominal:   Soft, non-tender this morning  Incision - clean, dry and intact  G-tube clamped   Neurological: He is alert and oriented to person, place, and time.   Skin: Skin is warm and dry.   Psychiatric: He has a normal mood and affect. His behavior is normal.   Nursing note and vitals  reviewed.      Significant Labs:  CBC:     Recent Labs  Lab 05/27/18  0447   WBC 8.10   RBC 3.41*   HGB 9.2*   HCT 29.6*      MCV 87   MCH 27.0   MCHC 31.1*     BMP:     Recent Labs  Lab 05/27/18  0447   *      K 3.8      CO2 21*   BUN 37*   CREATININE 2.2*   CALCIUM 9.0   MG 1.7     CMP:     Recent Labs  Lab 05/27/18  0447   *   CALCIUM 9.0   ALBUMIN 1.6*   PROT 6.0      K 3.8   CO2 21*      BUN 37*   CREATININE 2.2*   ALKPHOS 145*   ALT 5*   AST 15   BILITOT 1.0     LFTs:     Recent Labs  Lab 05/27/18  0447   ALT 5*   AST 15   ALKPHOS 145*   BILITOT 1.0   PROT 6.0   ALBUMIN 1.6*     Coagulation:     Recent Labs  Lab 05/21/18  1327   LABPROT 11.5   INR 1.1   APTT 23.3     Assessment/Plan:     * Adrenal tumor    Patient is 83 yo M who presents with large necrotic left adrenal mass, s/p adrenalectomy 5/21/18    - tolerating regular diet  -IVF NS @ 50  -keep g-tube clamped  -pain/nausea meds PRN  -GI prophylaxis  -PT/OT          Acute respiratory insufficiency, postoperative    Aggressive pulmonary toilet  -IS, acapella, CPT q4  -duonebs                  Dementia    Home meds  -memantine        Urinary retention    Home meds  -dutasteride  -calabrese        Non-ischemic cardiomyopathy    Home meds  -amlodipine, clonidine, carvedilol, bidil          Type 2 diabetes mellitus with diabetic peripheral angiopathy without gangrene, without long-term current use of insulin    SSI, accuchecks        Microcytic anemia    - iron supplement  - appropriate response after 2 units pRBCs yesterday        Chronic atrial fibrillation    Continues on heparin claudy Roper MD  General Surgery  [facility]

## 2018-05-27 NOTE — ASSESSMENT & PLAN NOTE
Patient is 81 yo M who presents with large necrotic left adrenal mass, s/p adrenalectomy 5/21/18    - tolerating regular diet  -IVF NS @ 50  -keep g-tube clamped  -pain/nausea meds PRN  -GI prophylaxis  -PT/OT

## 2018-05-28 VITALS
SYSTOLIC BLOOD PRESSURE: 143 MMHG | OXYGEN SATURATION: 95 % | RESPIRATION RATE: 20 BRPM | TEMPERATURE: 98 F | WEIGHT: 234.81 LBS | DIASTOLIC BLOOD PRESSURE: 82 MMHG | BODY MASS INDEX: 29.2 KG/M2 | HEART RATE: 71 BPM | HEIGHT: 75 IN

## 2018-05-28 LAB
ALBUMIN SERPL BCP-MCNC: 1.5 G/DL
ALP SERPL-CCNC: 215 U/L
ALT SERPL W/O P-5'-P-CCNC: 9 U/L
ANION GAP SERPL CALC-SCNC: 8 MMOL/L
AST SERPL-CCNC: 24 U/L
BASOPHILS # BLD AUTO: 0.07 K/UL
BASOPHILS NFR BLD: 0.9 %
BILIRUB SERPL-MCNC: 1.1 MG/DL
BUN SERPL-MCNC: 32 MG/DL
CALCIUM SERPL-MCNC: 8.6 MG/DL
CHLORIDE SERPL-SCNC: 108 MMOL/L
CO2 SERPL-SCNC: 23 MMOL/L
CREAT SERPL-MCNC: 2.1 MG/DL
DIFFERENTIAL METHOD: ABNORMAL
EOSINOPHIL # BLD AUTO: 0.4 K/UL
EOSINOPHIL NFR BLD: 5.2 %
ERYTHROCYTE [DISTWIDTH] IN BLOOD BY AUTOMATED COUNT: 17.5 %
EST. GFR  (AFRICAN AMERICAN): 33.4 ML/MIN/1.73 M^2
EST. GFR  (NON AFRICAN AMERICAN): 28.9 ML/MIN/1.73 M^2
FACT X PPP CHRO-ACNC: 0.35 IU/ML
FACT X PPP CHRO-ACNC: 0.45 IU/ML
GLUCOSE SERPL-MCNC: 119 MG/DL
HCT VFR BLD AUTO: 28.4 %
HGB BLD-MCNC: 9 G/DL
IMM GRANULOCYTES # BLD AUTO: 0.09 K/UL
IMM GRANULOCYTES NFR BLD AUTO: 1.1 %
LYMPHOCYTES # BLD AUTO: 0.9 K/UL
LYMPHOCYTES NFR BLD: 11.9 %
MAGNESIUM SERPL-MCNC: 2 MG/DL
MCH RBC QN AUTO: 27.3 PG
MCHC RBC AUTO-ENTMCNC: 31.7 G/DL
MCV RBC AUTO: 86 FL
MONOCYTES # BLD AUTO: 0.6 K/UL
MONOCYTES NFR BLD: 7.4 %
NEUTROPHILS # BLD AUTO: 5.8 K/UL
NEUTROPHILS NFR BLD: 73.5 %
NRBC BLD-RTO: 0 /100 WBC
PHOSPHATE SERPL-MCNC: 3 MG/DL
PLATELET # BLD AUTO: 298 K/UL
PMV BLD AUTO: 12.5 FL
POCT GLUCOSE: 115 MG/DL (ref 70–110)
POTASSIUM SERPL-SCNC: 3.5 MMOL/L
PROT SERPL-MCNC: 5.7 G/DL
RBC # BLD AUTO: 3.3 M/UL
SODIUM SERPL-SCNC: 139 MMOL/L
WBC # BLD AUTO: 7.89 K/UL

## 2018-05-28 PROCEDURE — 27000646 HC AEROBIKA DEVICE

## 2018-05-28 PROCEDURE — 85025 COMPLETE CBC W/AUTO DIFF WBC: CPT

## 2018-05-28 PROCEDURE — 25000003 PHARM REV CODE 250: Performed by: STUDENT IN AN ORGANIZED HEALTH CARE EDUCATION/TRAINING PROGRAM

## 2018-05-28 PROCEDURE — 84100 ASSAY OF PHOSPHORUS: CPT

## 2018-05-28 PROCEDURE — 83735 ASSAY OF MAGNESIUM: CPT

## 2018-05-28 PROCEDURE — 85520 HEPARIN ASSAY: CPT

## 2018-05-28 PROCEDURE — 94640 AIRWAY INHALATION TREATMENT: CPT

## 2018-05-28 PROCEDURE — 97535 SELF CARE MNGMENT TRAINING: CPT

## 2018-05-28 PROCEDURE — 97530 THERAPEUTIC ACTIVITIES: CPT

## 2018-05-28 PROCEDURE — 25000242 PHARM REV CODE 250 ALT 637 W/ HCPCS: Performed by: STUDENT IN AN ORGANIZED HEALTH CARE EDUCATION/TRAINING PROGRAM

## 2018-05-28 PROCEDURE — 94761 N-INVAS EAR/PLS OXIMETRY MLT: CPT

## 2018-05-28 PROCEDURE — 99900035 HC TECH TIME PER 15 MIN (STAT)

## 2018-05-28 PROCEDURE — 25000003 PHARM REV CODE 250: Performed by: SURGERY

## 2018-05-28 PROCEDURE — 36415 COLL VENOUS BLD VENIPUNCTURE: CPT

## 2018-05-28 PROCEDURE — 27000221 HC OXYGEN, UP TO 24 HOURS

## 2018-05-28 PROCEDURE — 94664 DEMO&/EVAL PT USE INHALER: CPT

## 2018-05-28 PROCEDURE — 80053 COMPREHEN METABOLIC PANEL: CPT

## 2018-05-28 PROCEDURE — 63600175 PHARM REV CODE 636 W HCPCS: Performed by: SURGERY

## 2018-05-28 RX ORDER — TAMSULOSIN HYDROCHLORIDE 0.4 MG/1
0.4 CAPSULE ORAL DAILY
Qty: 30 CAPSULE | Refills: 11 | Status: ON HOLD | OUTPATIENT
Start: 2018-05-29 | End: 2018-09-04 | Stop reason: HOSPADM

## 2018-05-28 RX ADMIN — FERROUS SULFATE TAB EC 325 MG (65 MG FE EQUIVALENT) 325 MG: 325 (65 FE) TABLET DELAYED RESPONSE at 08:05

## 2018-05-28 RX ADMIN — CARVEDILOL 25 MG: 25 TABLET, FILM COATED ORAL at 08:05

## 2018-05-28 RX ADMIN — IPRATROPIUM BROMIDE AND ALBUTEROL SULFATE 3 ML: .5; 3 SOLUTION RESPIRATORY (INHALATION) at 08:05

## 2018-05-28 RX ADMIN — SODIUM BICARBONATE 650 MG TABLET 650 MG: at 02:05

## 2018-05-28 RX ADMIN — GUAIFENESIN 600 MG: 600 TABLET, EXTENDED RELEASE ORAL at 08:05

## 2018-05-28 RX ADMIN — HEPARIN SODIUM 27.12 UNITS/KG/HR: 10000 INJECTION, SOLUTION INTRAVENOUS at 06:05

## 2018-05-28 RX ADMIN — IPRATROPIUM BROMIDE AND ALBUTEROL SULFATE 3 ML: .5; 3 SOLUTION RESPIRATORY (INHALATION) at 11:05

## 2018-05-28 RX ADMIN — HYDRALAZINE HYDROCHLORIDE AND ISOSORBIDE DINITRATE 2 TABLET: 37.5; 2 TABLET, FILM COATED ORAL at 02:05

## 2018-05-28 RX ADMIN — PANTOPRAZOLE SODIUM 40 MG: 40 TABLET, DELAYED RELEASE ORAL at 08:05

## 2018-05-28 RX ADMIN — APIXABAN 2.5 MG: 2.5 TABLET, FILM COATED ORAL at 08:05

## 2018-05-28 RX ADMIN — CLONIDINE HYDROCHLORIDE 0.3 MG: 0.1 TABLET ORAL at 02:05

## 2018-05-28 RX ADMIN — TAMSULOSIN HYDROCHLORIDE 0.4 MG: 0.4 CAPSULE ORAL at 08:05

## 2018-05-28 RX ADMIN — MEMANTINE HYDROCHLORIDE 10 MG: 5 TABLET ORAL at 08:05

## 2018-05-28 RX ADMIN — DUTASTERIDE 0.5 MG: 0.5 CAPSULE, LIQUID FILLED ORAL at 08:05

## 2018-05-28 RX ADMIN — CLONIDINE HYDROCHLORIDE 0.3 MG: 0.1 TABLET ORAL at 08:05

## 2018-05-28 RX ADMIN — HYDRALAZINE HYDROCHLORIDE AND ISOSORBIDE DINITRATE 2 TABLET: 37.5; 2 TABLET, FILM COATED ORAL at 08:05

## 2018-05-28 RX ADMIN — SODIUM BICARBONATE 650 MG TABLET 650 MG: at 08:05

## 2018-05-28 NOTE — PLAN OF CARE
[facility]     Department of Hospital Medicine     [address] [place] [address]     [phone] after hours  [phone] fax       NURSING HOME ORDERS    05/28/2018    Admit to Nursing Home:      Skilled Bed                                                 Diagnoses:  Active Hospital Problems    Diagnosis  POA    *Adrenal tumor [D49.7]  Yes    Acute blood loss anemia [D62]  No    Acute respiratory insufficiency, postoperative [J95.89]  No    Urinary retention [R33.9]  Yes    Dementia [F03.90]  Yes    Non-ischemic cardiomyopathy [I42.8]  Yes    Type 2 diabetes mellitus with diabetic peripheral angiopathy without gangrene, without long-term current use of insulin [E11.51]  Yes    Chronic atrial fibrillation [I48.2]  Yes    Microcytic anemia [D50.9]  Yes      Resolved Hospital Problems    Diagnosis Date Resolved POA    Hypomagnesemia [E83.42] 05/26/2018 No       Patient is homebound due to:  Adrenal tumor    Allergies:Review of patient's allergies indicates:  No Known Allergies    Vitals:      Every shift (Skilled Nursing patients)    Diet: Regular diet   Supplement:  1 can every three times a day with meals                         Type:  House            Acitivities:      - Up in a chair each morning as tolerated   - Ambulate with assistance to bathroom   - Scheduled walks once each shift (every 8 hours)   - May ambulate independently   - May use walker, cane, or self-propelled wheelchair   - Weight bearing: as tolerated    LABS:  Per facility protocol   CMP, CBC each month for 3 months   Pre-albumin each month for 3 months     Nursing Precautions:     - Aspiration precautions:             -  Upright 90 degrees befor during and after meals             -  Suction at bedside          - Fall precautions per nursing home protocol   - Seizure precaution per correction protocol   - Decubitus precautions:        -  for positioning   - Pressure  reducing foam mattress   - Turn patient every two hours. Use wedge pillows to anchor patient    CONSULTS:     Physical Therapy to evaluate and treat     Occupational Therapy to evaluate and treat     Nutrition to evaluate and recommend diet    MISCELLANEOUS CARE:                Carney Care: Empty Carney bag every shift.  Change Carney every month     Routine Skin for Bedridden Patients:  Apply moisture barrier cream to all    skin folds and wet areas in perineal area daily and after baths and                           all bowel movements.  Gastric Tube care: Flush G tube daily with 20cc of water. May be used for medications and tube feedings if needed.          DIABETES CARE:       Check blood sugar:       Fingerstick blood sugar AC and HS        Report CBG < 60 or > 400 to physician.                                          Insulin Sliding Scale          Glucose  Novolog Insulin Subcutaneous        0 - 60   Orange juice or glucose tablet, hold insulin      No insulin   201-250  2 units   251-300  4 units   301-350  6 units   351-400  8 units   >400   10 units then call physician      Medications: Discontinue all previous medication orders, if any. See new list below.     Jose Cuadra   Home Medication Instructions MARYCHUY:[id number]    Printed on:05/28/18 6971   Medication Information                      acetaminophen (TYLENOL) 325 MG tablet  Take 1 tablet (325 mg total) by mouth every 6 (six) hours as needed.             albuterol-ipratropium 2.5mg-0.5mg/3mL (DUO-NEB) 0.5 mg-3 mg(2.5 mg base)/3 mL nebulizer solution  Take 3 mLs by nebulization every 8 (eight) hours. Rescue             amLODIPine (NORVASC) 5 MG tablet  Take 1 tablet (5 mg total) by mouth once daily.             carvedilol (COREG) 25 MG tablet  Take 1 tablet (25 mg total) by mouth 2 (two) times daily with meals.             cloNIDine (CATAPRES) 0.3 MG tablet  Take 1 tablet (0.3 mg total) by mouth 3 (three) times daily.             dutasteride  (AVODART) 0.5 mg capsule  Take 0.5 mg by mouth once daily.             ferrous sulfate 325 (65 FE) MG EC tablet  Take 1 tablet (325 mg total) by mouth 2 (two) times daily.             hydrALAZINE (APRESOLINE) 20 mg/mL injection  Inject 0.25 mLs (5 mg total) into the vein every 6 (six) hours as needed (170mmhg).             isosorbide mononitrate (IMDUR) 30 MG 24 hr tablet  TAKE ONE TABLET BY MOUTH once a day             isosorbide-hydrALAZINE 20-37.5 mg (BIDIL) 20-37.5 mg Tab  Take 2 tablets by mouth 3 (three) times daily.             memantine (NAMENDA) 10 MG Tab  Take 10 mg by mouth 2 (two) times daily.              nut.tx.imp.renal fxn,lac-reduc (SUPLENA CARB STEADY) 0.04 gram-1.8 kcal/mL Liqd  1 can tid             olmesartan (BENICAR) 20 MG tablet  Take 1 tablet (20 mg total) by mouth once daily.             ondansetron 4 mg/2 mL Soln  Inject 4 mg into the vein every 6 (six) hours as needed.             pantoprazole (PROTONIX) 40 MG tablet  Take 1 tablet (40 mg total) by mouth once daily.             polyethylene glycol (GLYCOLAX) 17 gram PwPk  Take 17 g by mouth once daily.             sodium bicarbonate 650 MG tablet  Take 1 tablet (650 mg total) by mouth 3 (three) times daily.                       _________________________________  Sue Ford MD  05/28/2018

## 2018-05-28 NOTE — PT/OT/SLP PROGRESS
Occupational Therapy   Treatment    Name: Jose Cuadra  MRN: [id number]  Admitting Diagnosis:  Adrenal tumor  7 Days Post-Op    Recommendations:     Discharge Recommendations: nursing facility, skilled  Discharge Equipment Recommendations:  none  Barriers to discharge:  Decreased caregiver support, Inaccessible home environment    Subjective     Communicated with: RN prior to session. Pt agreeable to participate in OT session.   Pain/Comfort:  · Pain Rating 1:  (Pt not rated)  · Location - Side 1: Bilateral  · Location - Orientation 1: generalized  · Location 1:  (buttocks)  · Pain Addressed 1: Pre-medicate for activity, Reposition, Distraction, Cessation of Activity  · Pain Rating Post-Intervention 1:  (did not rate)    Patients cultural, spiritual, Yazidism conflicts given the current situation: none noted or reported    Objective:     Patient found with: PICC line, pulse ox (continuous), SCD, telemetry, peripheral IV    General Precautions: Standard, fall, hearing impaired, vision impaired   Orthopedic Precautions:N/A   Braces: N/A     Occupational Performance:    Bed Mobility:    · Patient completed Rolling/Turning to Right with contact guard assistance with use of handrail   · Patient completed Supine to Sit with moderate assistance with HOB elevated      Functional Mobility/Transfers:  · Patient completed Sit <> Stand Transfer with moderate assistance  with  rolling walker   · Patient completed Bed <> Chair Transfer using Stand Pivot technique with moderate assistance with rolling walker  · Functional Mobility: Pt performed functional mobility for household distances with min A to mod A with use of RW and VC for safety awareness with upright posture.     Activities of Daily Living:  · Grooming: set-up with SPV for oral care while sitting upright in bedside chair   · LB Dressing: total assistance to don/doff socks while seated at edge of chair    Patient left up in chair with all lines intact, call button in  reach, chair alarm on and RN notified    [facility] 6 Click:  [facility] Total Score: 16    Treatment & Education:  · Pt educated on safety awareness with functional transfers and with completion of ADLS and OT POC  · Pt educated on role of OT and purpose of evaluation and goals for therapy  · Pt completed ADLS and functional mobility for treatment session as noted above  · Pt educated on importance of completing OOB with nursing staff assistance to increase pt functional activity tolerance and for the prevention of secondary complications following surgery  · Pt educated on line management in various set-ups (sitting in recliner, supine in bed, functional mobility tasks)  · White board/Communication board updated       Education:    Assessment:     Jose Cuadra is a 82 y.o. male with a medical diagnosis of Adrenal tumor.  He presents with performance deficits affecting function including weakness, impaired self care skills, impaired endurance, gait instability, impaired balance, impaired functional mobilty, impaired cognition, decreased safety awareness, pain, decreased lower extremity function, impaired cardiopulmonary response to activity. Pt tolerated treatment well, despite continued increased confusion and report of pain. Pt will continue to benefit from skilled OT services to address the above listed impairments, decrease caregiver burden, and increase pt functional independence level. Recommend SNF placement upon discharge.       Rehab Prognosis:  Fair; patient would benefit from acute skilled OT services to address these deficits and reach maximum level of function.       Plan:     Patient to be seen 4 x/week to address the above listed problems via self-care/home management, therapeutic activities, therapeutic exercises, neuromuscular re-education  · Plan of Care Expires: 06/20/18  · Plan of Care Reviewed with: patient    This Plan of care has been discussed with the patient who was involved in its development and  understands and is in agreement with the identified goals and treatment plan    GOALS:    Occupational Therapy Goals        Problem: Occupational Therapy Goal    Goal Priority Disciplines Outcome Interventions   Occupational Therapy Goal     OT, PT/OT Ongoing (interventions implemented as appropriate)    Description:  Goals to be met by: 06/10/2018     Patient will increase functional independence with ADLs by performing:    UE Dressing with Supervision.  LE Dressing with Stand-by Assistance.  Grooming while standing with Stand-by Assistance.  Toileting from bedside commode with Stand-by Assistance for hygiene and clothing management.   Toilet transfer to bedside commode with Stand-by Assistance.  Upper extremity exercise program x20 reps per handout, with supervision and assistance as needed.                      Time Tracking:     OT Date of Treatment: 05/28/18  OT Start Time: 0927  OT Stop Time: 0951  OT Total Time (min): 24 min    Billable Minutes:Self Care/Home Management 12  Therapeutic Activity 12    Tereso Cardoso OT  5/28/2018

## 2018-05-28 NOTE — PROGRESS NOTES
" [facility]  Adult Nutrition  Progress Note    SUMMARY       Recommendations    Recommendation/Intervention:   1. Continue regular diet.   2. Recommend adding boost glucose control w/ all meals 2/2 po intake <50%.   3. Encourage po intake.   4. RD following.     Goals: meet >85% of EEN/EPN via po intake  Nutrition Goal Status: goal not met  Communication of RD Recs:  (POC)    Reason for Assessment    Reason for Assessment: RD follow-up  Diagnosis:  (Adrenal tumor )  Relevant Medical History: T2DM, HLD, HTN, CERD, dementia, RCC, possible colon CA, CKD 4, Afib w/ pacemaker, CHF, CAD  Interdisciplinary Rounds: attended  General Information Comments: POD7 adrenalectomy and G tube placement. Attempted to see pt twice. Pt on regular diet. Per notes, pt tolerating diet w/ low appetite (25% intake) and has had no BM. Pt on 2L NC.   Nutrition Discharge Planning: renal/diabetic diet    Nutrition/Diet History    Do you have any cultural, spiritual, Catholic conflicts, given your current situation?: none noted or reported  Factors Affecting Nutritional Intake: NPO    Anthropometrics    Temp: 97.8 °F (36.6 °C)  Height Method: Stated  Height: 6' 3" (190.5 cm)  Height (inches): 75 in  Weight Method: Bed Scale  Weight: 106.5 kg (234 lb 13 oz)  Weight (lb): 234.81 lb  Ideal Body Weight (IBW), Male: 196 lb  % Ideal Body Weight, Male (lb): 119.8 lb  BMI (Calculated): 29.4  BMI Grade: 25 - 29.9 - overweight       Lab/Procedures/Meds    Pertinent Labs Reviewed: reviewed  Pertinent Labs Comments: BUN 32H, Cr 2.1H, GFR 28.9H, glucose 119H, Alk Phos 215H, T bili 1.1H, ALT 9L  Pertinent Medications Reviewed: reviewed  Pertinent Medications Comments: IVF, apixaban, carvedilol, ferrous sulfate, pantoprazole    Physical Findings/Assessment    Overall Physical Appearance: nourished  Tubes: gastrostomy tube  Skin: incision(s)    Estimated/Assessed Needs    Weight Used For Calorie Calculations: 106.5 kg (234 lb 12.6 " oz)  Energy Calorie Requirements (kcal): 2046 kcal/day  Energy Need Method: Beverly-St Jeor (X 1.1)  Protein Requirements: 85-96 gm/day (0.8-0.9 gm/kg)  Weight Used For Protein Calculations: 106.5 kg (234 lb 12.6 oz)  Fluid Requirements (mL): per MD  Fluid Need Method: RDA Method  RDA Method (mL): 2046       Nutrition Prescription Ordered    Current Diet Order: regular diet    Evaluation of Received Nutrient/Fluid Intake    I/O: +6.591L since admit     % Intake of Estimated Energy Needs: 25 - 50 %  % Meal Intake: 25 - 50 %    Nutrition Risk    Level of Risk/Frequency of Follow-up: low (f/u 1 X wk)     Assessment and Plan    * Adrenal tumor    Contributing Nutrition Diagnosis  Inadequate Energy Intake    Related to (etiology):   S/p adrenalectomy     Signs and Symptoms (as evidenced by):   Pt eating <50% and waiting for bowel function to return     Interventions/Recommendations (treatment strategy):  See recs    Nutrition Diagnosis Status:   Continues               Monitor and Evaluation    Food and Nutrient Intake: energy intake, food and beverage intake  Food and Nutrient Adminstration: diet order  Physical Activity and Function: nutrition-related ADLs and IADLs  Anthropometric Measurements: weight change, body mass index, weight  Biochemical Data, Medical Tests and Procedures: electrolyte and renal panel, gastrointestinal profile, glucose/endocrine profile, inflammatory profile, lipid profile  Nutrition-Focused Physical Findings: overall appearance     Nutrition Follow-Up    RD Follow-up?: Yes

## 2018-05-28 NOTE — PLAN OF CARE
VINICIUS paged the Resident to see if Pt was discharge ready for today and was told Pt was medically stable to transfer to SNF today. VINICIUS placed call to Mary at [facility] ([phone]) who stated they could accept Pt today. VINICIUS sent orders via [facility] for review. Mary to call VINICIUS back with any questions or concerns.     Yara Ewing LCSW

## 2018-05-28 NOTE — PLAN OF CARE
VINICIUS received call from Mary at [facility] stating Pt could come to them any time and that report could be called to  at [phone]. VINICIUS informed nurse of above and scheduled wheelchair transport with [facility] ([phone]) for within the hour.   Packet for transport left at 6th fl nurse's station.     PAULO GonzalesW

## 2018-05-28 NOTE — ASSESSMENT & PLAN NOTE
Patient is 81 yo M who presents with large necrotic left adrenal mass, s/p adrenalectomy 5/21/18    -tolerating regular diet  -SLIV  -keep g-tube clamped  -pain/nausea meds PRN  -GI prophylaxis  -PT/OT  -Starting eliquis and stopping heparin today

## 2018-05-28 NOTE — PROGRESS NOTES
IV d/c'd, catheter intact. Pt tolerated well. Carney in place. VSS on RA. Pt's daughters at bedside. Pt wheeled off unit by [facility].

## 2018-05-28 NOTE — PLAN OF CARE
Problem: Patient Care Overview  Goal: Plan of Care Review      Recommendations    Recommendation/Intervention:   1. Continue regular diet.   2. Recommend adding boost glucose control w/ all meals 2/2 po intake <50%.   3. Encourage po intake.   4. RD following.     Goals: meet >85% of EEN/EPN via po intake  Nutrition Goal Status: goal not met

## 2018-05-28 NOTE — PLAN OF CARE
Problem: Patient Care Overview  Goal: Plan of Care Review  Outcome: Ongoing (interventions implemented as appropriate)  Plan of care reviewed with patient. Patient AOX2-3. Forgetful, dementia. VSS, RA. ML DB FLORENCE no drainage. G - tube clamped no signs of discomfort Nausea or vomiting this shift. Carney replaced for retention. Adequate output, concentrated urine. X2 assist with walker. Incont of bowels but no bowel movement this shift. Buttock and genitalia reddend d/t diarrhea. Barrier cream applied.  2L NC maintaining 95 %. Tolerating diet, poor appetite. Call light in reach. Patient reoriented often. Frequent rounds for safety. Bed alarm on and functional. RN will continue to monitor

## 2018-05-28 NOTE — PROGRESS NOTES
"[facility]  General Surgery  Progress Note    Subjective:     History of Present Illness:  Patient is an 83 yo M with history of dementia, RCC, possible colon CA, CKD 4, Afib with pacemaker, CHF with EF 36%, and a large abdominal (likely adrenal) mass who presented to OSH 5/2/18 with pneumonia and during his admission was found to have a large adrenal mass that was enlarged from previous CT scan, and he was transferred here for further workup. A CT scan from 2016 showed a similar sized mass. He underwent biopsy which showed no obvious malignancy, mostly necrotic tissue. He has completed his course of abx for pneumonia and his SOA has resolved.    When asked, he states the mass has been present for years. He denies any nausea, vomiting, weight loss, fevers, or chills. He continues to have bowel movements, with bowel incontinence (he says that's his baseline). He has been off anticoagulation despite being in afib. He has been having issues urinating.    He has undergone right nephrectomy and colon resection of 22 cm "maybe for cancer".    Post-Op Info:  Procedure(s) (LRB):  ADRENALECTOMY (Left)  INSERTION-TUBE-GASTROSTOMY (N/A)   7 Days Post-Op     Interval History: Tolerating regular diet with moderate intake.  AFVSS.     Medications:  Continuous Infusions:   sodium chloride 0.9% 50 mL/hr at 05/25/18 0533     Scheduled Meds:   albuterol-ipratropium  3 mL Nebulization Q4H WAKE    apixaban  2.5 mg Oral BID    carvedilol  25 mg Oral BID WM    cloNIDine  0.3 mg Oral TID    dutasteride  0.5 mg Oral Daily    ferrous sulfate  325 mg Oral BID    guaiFENesin  600 mg Oral BID    isosorbide-hydrALAZINE 20-37.5 mg  2 tablet Oral TID    memantine  10 mg Oral BID    pantoprazole  40 mg Oral Daily    sodium bicarbonate  1 tablet Oral TID    tamsulosin  0.4 mg Oral Daily     PRN Meds:dextrose 50%, dextrose 50%, glucagon (human recombinant), glucose, glucose, HYDROmorphone, HYDROmorphone, insulin " aspart U-100, labetalol, naloxone, ondansetron, promethazine (PHENERGAN) IVPB, sodium chloride 0.9%     Review of patient's allergies indicates:  No Known Allergies  Objective:     Vital Signs (Most Recent):  Temp: 97.8 °F (36.6 °C) (05/28/18 1204)  Pulse: 70 (05/28/18 1204)  Resp: 20 (05/28/18 1204)  BP: (!) 143/82 (05/28/18 1204)  SpO2: 96 % (05/28/18 1204) Vital Signs (24h Range):  Temp:  [97.1 °F (36.2 °C)-98.3 °F (36.8 °C)] 97.8 °F (36.6 °C)  Pulse:  [68-81] 70  Resp:  [16-20] 20  SpO2:  [93 %-98 %] 96 %  BP: (113-158)/(62-87) 143/82     Weight: 106.5 kg (234 lb 13 oz)  Body mass index is 29.35 kg/m².    Intake/Output - Last 3 Shifts       05/26 0700 - 05/27 0659 05/27 0700 - 05/28 0659 05/28 0700 - 05/29 0659    P.O. 400 200     I.V. (mL/kg) 1252 (11.8) 1457.6 (13.7)     Blood 232      NG/GT 20 60     IV Piggyback 500      Total Intake(mL/kg) 2404 (22.6) 1717.6 (16.1)     Urine (mL/kg/hr) 1575 (0.6) 2600 (1)     Drains 0 (0) 0 (0)     Total Output 1575 2600      Net +829 -882.4             Stool Occurrence 2 x 0 x           Physical Exam   Constitutional: He is oriented to person, place, and time. He appears well-developed and well-nourished. No distress.   HENT:   Head: Normocephalic and atraumatic.   Cardiovascular: Normal rate and regular rhythm.    Pulmonary/Chest: Effort normal. No respiratory distress.   Abdominal:   Soft, non-tender this morning  Incision - clean, dry and intact  G-tube clamped   Neurological: He is alert and oriented to person, place, and time.   Skin: Skin is warm and dry.   Psychiatric: He has a normal mood and affect. His behavior is normal.   Nursing note and vitals reviewed.      Significant Labs:  CBC:     Recent Labs  Lab 05/28/18  0433   WBC 7.89   RBC 3.30*   HGB 9.0*   HCT 28.4*      MCV 86   MCH 27.3   MCHC 31.7*     BMP:     Recent Labs  Lab 05/28/18  0433   *      K 3.5      CO2 23   BUN 32*   CREATININE 2.1*   CALCIUM 8.6*   MG 2.0     CMP:      Recent Labs  Lab 05/28/18  0433   *   CALCIUM 8.6*   ALBUMIN 1.5*   PROT 5.7*      K 3.5   CO2 23      BUN 32*   CREATININE 2.1*   ALKPHOS 215*   ALT 9*   AST 24   BILITOT 1.1*     LFTs:     Recent Labs  Lab 05/28/18  0433   ALT 9*   AST 24   ALKPHOS 215*   BILITOT 1.1*   PROT 5.7*   ALBUMIN 1.5*     Coagulation:     Recent Labs  Lab 05/21/18  1327   LABPROT 11.5   INR 1.1   APTT 23.3     Assessment/Plan:     * Adrenal tumor    Patient is 83 yo M who presents with large necrotic left adrenal mass, s/p adrenalectomy 5/21/18    -tolerating regular diet  -SLIV  -keep g-tube clamped  -pain/nausea meds PRN  -GI prophylaxis  -PT/OT  -Starting eliquis and stopping heparin today          Acute respiratory insufficiency, postoperative    Aggressive pulmonary toilet  -IS, acapella, CPT q4  -duonebs        Acute blood loss anemia    See above        Dementia    Home meds  -memantine        Urinary retention    Home meds  -dutasteride  -calabrese        Non-ischemic cardiomyopathy    Home meds  -amlodipine, clonidine, carvedilol, bidil          Type 2 diabetes mellitus with diabetic peripheral angiopathy without gangrene, without long-term current use of insulin    SSI, accuchecks        Microcytic anemia    - iron supplement  - H&H stable on heparin  - Transitioning to eliquis today        Chronic atrial fibrillation    Continues on heparin drip Jonathan Schoen, MD  General Surgery  [facility]

## 2018-05-28 NOTE — ASSESSMENT & PLAN NOTE
Contributing Nutrition Diagnosis  Inadequate Energy Intake    Related to (etiology):   S/p adrenalectomy     Signs and Symptoms (as evidenced by):   Pt eating <50% and waiting for bowel function to return     Interventions/Recommendations (treatment strategy):  See recs    Nutrition Diagnosis Status:   Continues

## 2018-05-28 NOTE — PLAN OF CARE
Problem: Patient Care Overview  Goal: Plan of Care Review  Plan of care reviewed with patient and pt. Family. Pt. Disoriented to place/situation. Nurse reoriented pt. Throughout shift. CDiff precautions removed. 2LNC with spo2 at 98%. Tele monitor on pt., in Afib. ML insicion to abdomen with staples intact. Gtube to left abdomen clamped, flushed with 20ml of NS. Heparin drip infusing, anti xa therapeutic. Fluids and electrolytes infusing. TB test to right forearm read-negative. Complaints of pain, prn meds given. Occasional nausea occurring. Urethral catheter in place, draining clear yellow urine. No bowel movements occurring. Regular diet maintained, pt. Complains of low appetite. Pt. Is X2 assist, frequent turning of pt. And positioning on pillows and wedge done throughout shift. Redness to sacrum, barrier ointment applied. Feet elevated on pillows. Pasha hose/scd on pt. BG checks completed before meals. Will monitor.

## 2018-05-28 NOTE — PLAN OF CARE
Problem: Occupational Therapy Goal  Goal: Occupational Therapy Goal  Goals to be met by: 06/10/2018     Patient will increase functional independence with ADLs by performing:    UE Dressing with Supervision.  LE Dressing with Stand-by Assistance.  Grooming while standing with Stand-by Assistance.  Toileting from bedside commode with Stand-by Assistance for hygiene and clothing management.   Toilet transfer to bedside commode with Stand-by Assistance.  Upper extremity exercise program x20 reps per handout, with supervision and assistance as needed.     Outcome: Ongoing (interventions implemented as appropriate)  Pt progressing towards all goals at this time. All goals remain appropriate. Revise goals as needed.    Tereso Cardoso, OT  5/28/2018

## 2018-05-28 NOTE — SUBJECTIVE & OBJECTIVE
Interval History: Tolerating regular diet with moderate intake.  AFVSS.     Medications:  Continuous Infusions:   sodium chloride 0.9% 50 mL/hr at 05/25/18 0533     Scheduled Meds:   albuterol-ipratropium  3 mL Nebulization Q4H WAKE    apixaban  2.5 mg Oral BID    carvedilol  25 mg Oral BID WM    cloNIDine  0.3 mg Oral TID    dutasteride  0.5 mg Oral Daily    ferrous sulfate  325 mg Oral BID    guaiFENesin  600 mg Oral BID    isosorbide-hydrALAZINE 20-37.5 mg  2 tablet Oral TID    memantine  10 mg Oral BID    pantoprazole  40 mg Oral Daily    sodium bicarbonate  1 tablet Oral TID    tamsulosin  0.4 mg Oral Daily     PRN Meds:dextrose 50%, dextrose 50%, glucagon (human recombinant), glucose, glucose, HYDROmorphone, HYDROmorphone, insulin aspart U-100, labetalol, naloxone, ondansetron, promethazine (PHENERGAN) IVPB, sodium chloride 0.9%     Review of patient's allergies indicates:  No Known Allergies  Objective:     Vital Signs (Most Recent):  Temp: 97.8 °F (36.6 °C) (05/28/18 1204)  Pulse: 70 (05/28/18 1204)  Resp: 20 (05/28/18 1204)  BP: (!) 143/82 (05/28/18 1204)  SpO2: 96 % (05/28/18 1204) Vital Signs (24h Range):  Temp:  [97.1 °F (36.2 °C)-98.3 °F (36.8 °C)] 97.8 °F (36.6 °C)  Pulse:  [68-81] 70  Resp:  [16-20] 20  SpO2:  [93 %-98 %] 96 %  BP: (113-158)/(62-87) 143/82     Weight: 106.5 kg (234 lb 13 oz)  Body mass index is 29.35 kg/m².    Intake/Output - Last 3 Shifts       05/26 0700 - 05/27 0659 05/27 0700 - 05/28 0659 05/28 0700 - 05/29 0659    P.O. 400 200     I.V. (mL/kg) 1252 (11.8) 1457.6 (13.7)     Blood 232      NG/GT 20 60     IV Piggyback 500      Total Intake(mL/kg) 2404 (22.6) 1717.6 (16.1)     Urine (mL/kg/hr) 1575 (0.6) 2600 (1)     Drains 0 (0) 0 (0)     Total Output 1575 2600      Net +829 -882.4             Stool Occurrence 2 x 0 x           Physical Exam   Constitutional: He is oriented to person, place, and time. He appears well-developed and well-nourished. No distress.   HENT:    Head: Normocephalic and atraumatic.   Cardiovascular: Normal rate and regular rhythm.    Pulmonary/Chest: Effort normal. No respiratory distress.   Abdominal:   Soft, non-tender this morning  Incision - clean, dry and intact  G-tube clamped   Neurological: He is alert and oriented to person, place, and time.   Skin: Skin is warm and dry.   Psychiatric: He has a normal mood and affect. His behavior is normal.   Nursing note and vitals reviewed.      Significant Labs:  CBC:     Recent Labs  Lab 05/28/18  0433   WBC 7.89   RBC 3.30*   HGB 9.0*   HCT 28.4*      MCV 86   MCH 27.3   MCHC 31.7*     BMP:     Recent Labs  Lab 05/28/18  0433   *      K 3.5      CO2 23   BUN 32*   CREATININE 2.1*   CALCIUM 8.6*   MG 2.0     CMP:     Recent Labs  Lab 05/28/18  0433   *   CALCIUM 8.6*   ALBUMIN 1.5*   PROT 5.7*      K 3.5   CO2 23      BUN 32*   CREATININE 2.1*   ALKPHOS 215*   ALT 9*   AST 24   BILITOT 1.1*     LFTs:     Recent Labs  Lab 05/28/18  0433   ALT 9*   AST 24   ALKPHOS 215*   BILITOT 1.1*   PROT 5.7*   ALBUMIN 1.5*     Coagulation:     Recent Labs  Lab 05/21/18  1327   LABPROT 11.5   INR 1.1   APTT 23.3

## 2018-05-29 ENCOUNTER — PATIENT OUTREACH (OUTPATIENT)
Dept: ADMINISTRATIVE | Facility: CLINIC | Age: 83
End: 2018-05-29

## 2018-05-29 NOTE — PT/OT/SLP DISCHARGE
Occupational Therapy Discharge Summary    Jose Cuadra  MRN: [id number]   Principal Problem: Adrenal tumor      Patient Discharged from acute Occupational Therapy on 05/28/2018.  Please refer to prior OT note dated 05/28/2018 for functional status.    Assessment:      Patient appropriate for care in another setting.    Objective:     GOALS:    Occupational Therapy Goals     Not on file          Multidisciplinary Problems (Resolved)        Problem: Occupational Therapy Goal    Goal Priority Disciplines Outcome Interventions   Occupational Therapy Goal   (Resolved)     OT, PT/OT Outcome(s) achieved    Description:  Goals to be met by: 06/10/2018     Patient will increase functional independence with ADLs by performing:    UE Dressing with Supervision.  LE Dressing with Stand-by Assistance.  Grooming while standing with Stand-by Assistance.  Toileting from bedside commode with Stand-by Assistance for hygiene and clothing management.   Toilet transfer to bedside commode with Stand-by Assistance.  Upper extremity exercise program x20 reps per handout, with supervision and assistance as needed.                      Reasons for Discontinuation of Therapy Services  Transfer to alternate level of care.      Plan:     Patient Discharged to: Skilled Nursing Facility    Tereso Cardoso, OT  5/29/2018

## 2018-05-29 NOTE — DISCHARGE SUMMARY
[facility]  General Surgery  Discharge Summary      Patient Name: Jose Cuadra  MRN: [id number]  Admission Date: 5/14/2018  Hospital Length of Stay: 14 days  Discharge Date and Time: 5/28/2018  3:24 PM  Attending Physician: Vipin Giron MD  Discharging Provider: Sue Ford MD  Primary Care Provider: Sudhir Long MD     HPI:   Patient is an 83 yo M with history of dementia, RCC, possible colon CA, CKD 4, Afib with pacemaker, CHF with EF 36%, and a large abdominal (likely adrenal) mass who presented to OSH 5/2/18 with pneumonia and during his admission was found to have a large adrenal mass that was enlarged from previous CT scan, and he was transferred here for further workup. A CT scan from 2016 showed a similar sized mass. He underwent biopsy which showed no obvious malignancy, mostly necrotic tissue. He has completed his course of abx for pneumonia and his SOA has resolved.     When asked, he states the mass has been present for years. He denies any nausea, vomiting, weight loss, fevers, or chills. He continues to have bowel movements, with bowel incontinence (he says that's his baseline). He has been off anticoagulation despite being in afib. He has been having issues urinating.    Procedure(s) (LRB):  ADRENALECTOMY (Left)  INSERTION-TUBE-GASTROSTOMY (N/A)     Hospital Course:   Patient was admitted for workup and operative planning for large adrenal mass. He was taken to the OR for open adrenalectomy and G tube placement, which he tolerated well. For procedure details, refer to operative report. His post operative course was uncomplicated. His diet was advanced as tolerated to a regular diet. As he tolerated oral intake, he was transitioned to oral pain medications. Due to urinary retention, his calabrese was replaced and he will need to follow up with urology outpatient for calabrese removal. Physical therapy assisted in ambulation post operatively and recommended discharge to skilled  nursing facility for further rehabilitation prior to returning home. Heparin had been started for his atrial fibrillation and was transitioned to apixaban prior to discharge. He was discharged to skilled nursing facility for further rehabilitation.     Consults:   Consults         Status Ordering Provider     Inpatient consult to Cardiology  Once     Provider:  (Not yet assigned)    MORGAN Aguilar          Significant Diagnostic Studies:     Pending Diagnostic Studies:     None        Final Active Diagnoses:    Diagnosis Date Noted POA    PRINCIPAL PROBLEM:  Adrenal tumor [D49.7] 05/14/2018 Yes    Acute blood loss anemia [D62] 05/23/2018 No    Acute respiratory insufficiency, postoperative [J95.89] 05/23/2018 No    Urinary retention [R33.9] 05/15/2018 Yes    Dementia [F03.90] 05/15/2018 Yes    Non-ischemic cardiomyopathy [I42.8] 03/22/2018 Yes    Type 2 diabetes mellitus with diabetic peripheral angiopathy without gangrene, without long-term current use of insulin [E11.51] 03/21/2018 Yes    Chronic atrial fibrillation [I48.2] 03/21/2018 Yes    Microcytic anemia [D50.9] 03/21/2018 Yes      Problems Resolved During this Admission:    Diagnosis Date Noted Date Resolved POA    Hypomagnesemia [E83.42] 05/22/2018 05/26/2018 No      Discharged Condition: good    Disposition: Skilled Nursing Facility    Follow Up:  Follow-up Information     [facility].    Specialties:  Nursing Home Agency, SNF Agency  Why:  SNF  Contact information:  [address] [facility] [address]  [phone]             Vipin Giron MD In 2 weeks.    Specialties:  General Surgery, Surgery  Contact information:  [address] [facility] [id number] [phone]             [facility] - Urology 4th Floor In 1 week.    Specialty:  Urology  Why:  Urinary retention, calabrese since surgery  Contact information:  [facility] [facility] [address] [phone]  Additional information:  [facility] -  4th Floor               Patient Instructions:     Diet Adult Regular     Lifting restrictions   Order Comments: No lifting more than 10lbs for 6 weeks     Notify your health care provider if you experience any of the following:  temperature >100.4     Notify your health care provider if you experience any of the following:  persistent nausea and vomiting or diarrhea     Notify your health care provider if you experience any of the following:  severe uncontrolled pain     Notify your health care provider if you experience any of the following:  redness, tenderness, or signs of infection (pain, swelling, redness, odor or green/yellow discharge around incision site)     Notify your health care provider if you experience any of the following:  difficulty breathing or increased cough     Notify your health care provider if you experience any of the following:  severe persistent headache     Notify your health care provider if you experience any of the following:  worsening rash     Notify your health care provider if you experience any of the following:  persistent dizziness, light-headedness, or visual disturbances     Notify your health care provider if you experience any of the following:  increased confusion or weakness     No dressing needed       Medications:  Reconciled Home Medications:      Medication List      START taking these medications    apixaban 2.5 mg Tab  Take 1 tablet (2.5 mg total) by mouth 2 (two) times daily.     tamsulosin 0.4 mg Cp24  Commonly known as:  FLOMAX  Take 1 capsule (0.4 mg total) by mouth once daily.        CHANGE how you take these medications    carvedilol 25 MG tablet  Commonly known as:  COREG  Take 1 tablet (25 mg total) by mouth 2 (two) times daily with meals.  What changed:  Another medication with the same name was removed. Continue taking this medication, and follow the directions you see here.     cloNIDine 0.3 MG tablet  Commonly known as:  CATAPRES  Take 1 tablet (0.3 mg  total) by mouth 3 (three) times daily.  What changed:  Another medication with the same name was removed. Continue taking this medication, and follow the directions you see here.     ferrous sulfate 325 (65 FE) MG EC tablet  Take 1 tablet (325 mg total) by mouth 2 (two) times daily.  What changed:  additional instructions     hydrALAZINE 20 mg/mL injection  Commonly known as:  APRESOLINE  Inject 0.25 mLs (5 mg total) into the vein every 6 (six) hours as needed (170mmhg).  What changed:  Another medication with the same name was removed. Continue taking this medication, and follow the directions you see here.     sodium bicarbonate 650 MG tablet  Take 1 tablet (650 mg total) by mouth 3 (three) times daily.  What changed:  Another medication with the same name was removed. Continue taking this medication, and follow the directions you see here.        CONTINUE taking these medications    acetaminophen 325 MG tablet  Commonly known as:  TYLENOL  Take 1 tablet (325 mg total) by mouth every 6 (six) hours as needed.     albuterol-ipratropium 2.5 mg-0.5 mg/3 mL nebulizer solution  Commonly known as:  DUO-NEB  Take 3 mLs by nebulization every 8 (eight) hours. Rescue     amLODIPine 5 MG tablet  Commonly known as:  NORVASC  Take 1 tablet (5 mg total) by mouth once daily.     dutasteride 0.5 mg capsule  Commonly known as:  AVODART  Take 0.5 mg by mouth once daily.     isosorbide-hydrALAZINE 20-37.5 mg 20-37.5 mg Tab  Commonly known as:  BIDIL  Take 2 tablets by mouth 3 (three) times daily.     memantine 10 MG Tab  Commonly known as:  NAMENDA  Take 10 mg by mouth 2 (two) times daily.     nut.tx.imp.renal fxn,lac-reduc 0.04 gram-1.8 kcal/mL Liqd  Commonly known as:  SUPLENA CARB STEADY  1 can tid     olmesartan 20 MG tablet  Commonly known as:  BENICAR  Take 1 tablet (20 mg total) by mouth once daily.     ondansetron 4 mg/2 mL Soln  Inject 4 mg into the vein every 6 (six) hours as needed.     pantoprazole 40 MG tablet  Commonly  known as:  PROTONIX  Take 1 tablet (40 mg total) by mouth once daily.     polyethylene glycol 17 gram Pwpk  Commonly known as:  GLYCOLAX  Take 17 g by mouth once daily.            Sue Ford MD  General Surgery  [facility]

## 2018-05-29 NOTE — PLAN OF CARE
Plan is to discharge to [facility] via [employer] transportation.       05/29/18 0753   Final Note   Assessment Type Final Discharge Note   Discharge Disposition SNF   Right Care Referral Info   Post Acute Recommendation SNF / Sub-Acute Rehab   Referral Type SNF   Facility Name [facility]

## 2018-06-01 PROBLEM — I15.0 RENOVASCULAR HYPERTENSION: Status: ACTIVE | Noted: 2018-03-21

## 2018-06-07 ENCOUNTER — TELEPHONE (OUTPATIENT)
Dept: SURGERY | Facility: CLINIC | Age: 83
End: 2018-06-07

## 2018-06-07 NOTE — TELEPHONE ENCOUNTER
Spoke with [employer] regarding request from Stanislav to have pt f/u in [place] instead of [place]. Appt at [facility] surgery [facility] has been scheduled on 6/18/18 at 1:30 pm. Appt info faxed to [phone], and [phone].

## 2018-06-18 PROBLEM — Z09 POSTOP CHECK: Status: ACTIVE | Noted: 2018-06-18

## 2018-06-18 PROBLEM — D49.7 ADRENAL TUMOR: Status: RESOLVED | Noted: 2018-05-14 | Resolved: 2018-06-18

## 2018-06-21 PROBLEM — J18.9 PNEUMONIA: Status: ACTIVE | Noted: 2018-06-21

## 2018-06-22 PROBLEM — S61.411A SKIN TEAR OF RIGHT HAND WITHOUT COMPLICATION: Status: ACTIVE | Noted: 2018-06-22

## 2018-06-22 PROBLEM — T81.31XA DEHISCENCE OF INCISION, INITIAL ENCOUNTER: Status: ACTIVE | Noted: 2018-06-22

## 2018-06-22 PROBLEM — L89.322 PRESSURE ULCER OF LEFT BUTTOCK, STAGE 2: Status: ACTIVE | Noted: 2018-06-22

## 2018-06-22 PROBLEM — E63.9 INADEQUATE DIETARY ENERGY INTAKE: Status: ACTIVE | Noted: 2018-06-22

## 2018-07-27 PROBLEM — E86.0 DEHYDRATION: Status: ACTIVE | Noted: 2018-07-27

## 2018-07-28 PROBLEM — K21.9 GERD (GASTROESOPHAGEAL REFLUX DISEASE): Chronic | Status: ACTIVE | Noted: 2018-07-28

## 2018-07-28 PROBLEM — N30.01 ACUTE CYSTITIS WITH HEMATURIA: Status: ACTIVE | Noted: 2018-07-28

## 2018-07-28 PROBLEM — E78.5 HYPERLIPIDEMIA: Status: ACTIVE | Noted: 2018-07-28

## 2018-07-28 PROBLEM — K21.9 GERD (GASTROESOPHAGEAL REFLUX DISEASE): Status: ACTIVE | Noted: 2018-07-28

## 2018-07-28 PROBLEM — I48.20 CHRONIC ATRIAL FIBRILLATION: Chronic | Status: ACTIVE | Noted: 2018-03-21

## 2018-07-28 PROBLEM — N18.4 CKD (CHRONIC KIDNEY DISEASE) STAGE 4, GFR 15-29 ML/MIN: Chronic | Status: ACTIVE | Noted: 2018-05-07

## 2018-07-28 PROBLEM — I10 HTN (HYPERTENSION): Chronic | Status: ACTIVE | Noted: 2018-07-28

## 2018-07-28 PROBLEM — I10 HTN (HYPERTENSION): Status: ACTIVE | Noted: 2018-07-28

## 2018-07-28 PROBLEM — E11.9 DIABETES: Chronic | Status: ACTIVE | Noted: 2018-05-07

## 2018-07-29 PROBLEM — E83.52 HYPERCALCEMIA: Status: ACTIVE | Noted: 2018-07-29

## 2018-07-29 PROBLEM — N17.9 AKI (ACUTE KIDNEY INJURY): Status: ACTIVE | Noted: 2018-07-29

## 2018-07-30 PROBLEM — L89.309 PRESSURE ULCER OF BUTTOCK: Status: ACTIVE | Noted: 2018-07-30

## 2018-07-30 PROBLEM — R32 INCONTINENCE: Status: ACTIVE | Noted: 2018-07-30

## 2018-07-30 PROBLEM — T14.8XXA MULTIPLE SKIN TEARS: Status: ACTIVE | Noted: 2018-07-30

## 2018-07-30 PROBLEM — I35.0 NONRHEUMATIC AORTIC VALVE STENOSIS: Status: ACTIVE | Noted: 2018-07-30

## 2018-07-30 PROBLEM — T81.89XA NON-HEALING SURGICAL WOUND: Status: ACTIVE | Noted: 2018-07-30

## 2018-08-01 PROBLEM — E07.9 THYROID MASS: Status: ACTIVE | Noted: 2018-08-01

## 2018-08-04 PROBLEM — A49.8 CLOSTRIDIUM DIFFICILE INFECTION: Status: ACTIVE | Noted: 2018-08-04

## 2018-08-06 PROBLEM — D34 HURTHLE CELL NEOPLASM OF THYROID: Status: ACTIVE | Noted: 2018-08-01

## 2018-08-08 PROBLEM — E07.9 THYROID MASS: Status: ACTIVE | Noted: 2018-08-08

## 2018-08-13 PROBLEM — J81.1 PULMONARY EDEMA: Status: ACTIVE | Noted: 2018-08-13

## 2018-08-13 PROBLEM — N30.01 ACUTE CYSTITIS WITH HEMATURIA: Status: RESOLVED | Noted: 2018-07-28 | Resolved: 2018-08-13

## 2018-08-15 PROBLEM — R50.9 FEVER: Status: ACTIVE | Noted: 2018-08-15

## 2018-08-17 PROBLEM — B37.7: Status: ACTIVE | Noted: 2018-08-17

## 2018-08-17 PROBLEM — N10 ACUTE PYELONEPHRITIS: Status: ACTIVE | Noted: 2018-08-17

## 2018-08-23 PROBLEM — A49.8 CLOSTRIDIUM DIFFICILE INFECTION: Status: RESOLVED | Noted: 2018-08-04 | Resolved: 2018-08-23

## 2018-08-23 PROBLEM — E87.20 METABOLIC ACIDOSIS, NORMAL ANION GAP (NAG): Status: RESOLVED | Noted: 2018-03-21 | Resolved: 2018-08-23

## 2018-08-23 PROBLEM — J81.1 PULMONARY EDEMA: Status: RESOLVED | Noted: 2018-08-13 | Resolved: 2018-08-23

## 2018-08-23 PROBLEM — R50.9 FEVER: Status: RESOLVED | Noted: 2018-08-15 | Resolved: 2018-08-23

## 2018-08-23 PROBLEM — G93.40 ENCEPHALOPATHY: Status: ACTIVE | Noted: 2018-08-23

## 2018-08-24 PROBLEM — L25.8 INCONTINENCE ASSOCIATED DERMATITIS: Status: ACTIVE | Noted: 2018-08-24

## 2018-08-24 PROBLEM — R32 INCONTINENCE ASSOCIATED DERMATITIS: Status: ACTIVE | Noted: 2018-08-24

## 2018-08-27 PROBLEM — E86.0 DEHYDRATION: Status: RESOLVED | Noted: 2018-07-27 | Resolved: 2018-08-27

## 2018-08-27 PROBLEM — D72.825 BANDEMIA: Status: ACTIVE | Noted: 2018-08-27

## 2018-08-28 PROBLEM — T83.511A URINARY TRACT INFECTION ASSOCIATED WITH INDWELLING URETHRAL CATHETER: Status: ACTIVE | Noted: 2018-08-28

## 2018-08-28 PROBLEM — N39.0 URINARY TRACT INFECTION ASSOCIATED WITH INDWELLING URETHRAL CATHETER: Status: ACTIVE | Noted: 2018-08-28

## 2018-08-28 PROBLEM — I50.42 CHRONIC COMBINED SYSTOLIC AND DIASTOLIC CONGESTIVE HEART FAILURE: Status: ACTIVE | Noted: 2018-05-02

## 2018-08-28 PROBLEM — N18.5 STAGE 5 CHRONIC KIDNEY DISEASE NOT ON CHRONIC DIALYSIS: Status: ACTIVE | Noted: 2017-03-24

## 2018-08-28 PROBLEM — E86.0 DEHYDRATION: Status: ACTIVE | Noted: 2018-08-28

## 2018-08-29 PROBLEM — R63.8 INCREASED NUTRITIONAL NEEDS: Status: ACTIVE | Noted: 2018-08-29

## 2018-08-30 PROBLEM — L98.499 SKIN ULCERATION: Status: ACTIVE | Noted: 2018-08-30

## 2018-09-17 PROBLEM — Z09 POSTOP CHECK: Status: RESOLVED | Noted: 2018-06-18 | Resolved: 2018-09-17

## 2018-09-29 PROBLEM — N10 ACUTE PYELONEPHRITIS: Status: RESOLVED | Noted: 2018-08-17 | Resolved: 2018-09-29

## 2018-09-29 PROBLEM — B37.7: Status: RESOLVED | Noted: 2018-08-17 | Resolved: 2018-09-29

## 2018-09-29 PROBLEM — J95.89 ACUTE RESPIRATORY INSUFFICIENCY, POSTOPERATIVE: Status: RESOLVED | Noted: 2018-05-23 | Resolved: 2018-09-29

## 2018-09-29 PROBLEM — D72.825 BANDEMIA: Status: RESOLVED | Noted: 2018-08-27 | Resolved: 2018-09-29

## 2018-09-29 PROBLEM — J18.9 PNEUMONIA: Status: RESOLVED | Noted: 2018-06-21 | Resolved: 2018-09-29

## 2018-09-29 PROBLEM — T81.31XA DEHISCENCE OF INCISION, INITIAL ENCOUNTER: Status: RESOLVED | Noted: 2018-06-22 | Resolved: 2018-09-29

## 2018-09-29 PROBLEM — E86.0 DEHYDRATION: Status: RESOLVED | Noted: 2018-08-28 | Resolved: 2018-09-29

## 2018-09-29 PROBLEM — N39.0 URINARY TRACT INFECTION ASSOCIATED WITH INDWELLING URETHRAL CATHETER: Status: RESOLVED | Noted: 2018-08-28 | Resolved: 2018-09-29

## 2018-09-29 PROBLEM — R32 INCONTINENCE: Status: RESOLVED | Noted: 2018-07-30 | Resolved: 2018-09-29

## 2018-09-29 PROBLEM — S61.411A SKIN TEAR OF RIGHT HAND WITHOUT COMPLICATION: Status: RESOLVED | Noted: 2018-06-22 | Resolved: 2018-09-29

## 2018-09-29 PROBLEM — D62 ACUTE BLOOD LOSS ANEMIA: Status: RESOLVED | Noted: 2018-05-23 | Resolved: 2018-09-29

## 2018-09-29 PROBLEM — R80.1 PERSISTENT PROTEINURIA: Status: RESOLVED | Noted: 2018-03-21 | Resolved: 2018-09-29

## 2018-09-29 PROBLEM — E27.8 LEFT ADRENAL MASS: Status: RESOLVED | Noted: 2018-03-30 | Resolved: 2018-09-29

## 2018-09-29 PROBLEM — J18.9 PNEUMONIA OF RIGHT LOWER LOBE DUE TO INFECTIOUS ORGANISM: Status: RESOLVED | Noted: 2018-05-02 | Resolved: 2018-09-29

## 2018-09-29 PROBLEM — T83.511A URINARY TRACT INFECTION ASSOCIATED WITH INDWELLING URETHRAL CATHETER: Status: RESOLVED | Noted: 2018-08-28 | Resolved: 2018-09-29

## 2018-09-29 PROBLEM — E87.6 HYPOKALEMIA: Status: RESOLVED | Noted: 2018-05-08 | Resolved: 2018-09-29

## 2018-09-29 PROBLEM — L89.309 PRESSURE ULCER OF BUTTOCK: Status: RESOLVED | Noted: 2018-07-30 | Resolved: 2018-09-29

## 2018-09-29 PROBLEM — R55 SYNCOPE: Status: RESOLVED | Noted: 2018-03-21 | Resolved: 2018-09-29

## 2018-10-03 PROBLEM — N19 UREMIA: Status: ACTIVE | Noted: 2018-10-03

## (undated) DEVICE — GAUZE SPONGE PEANUT STRL

## (undated) DEVICE — APPLIER CLIP LIAGCLIP 9.375IN

## (undated) DEVICE — BLADE SURG CARBON STEEL #10

## (undated) DEVICE — SUT 1 36IN COATED VICRYL VI

## (undated) DEVICE — SUT PROLENE 5-0 36 V-5 V-5

## (undated) DEVICE — SEE MEDLINE ITEM 156902

## (undated) DEVICE — APPLICATOR CHLORAPREP ORN 26ML

## (undated) DEVICE — DRESSING ADH ISLAND 3.6 X 14

## (undated) DEVICE — SUT 2/0 36IN COATED VICRYL

## (undated) DEVICE — POWDER ARISTA AH 3G

## (undated) DEVICE — SUT VICRYL BR 1 GEN 27 CT-1

## (undated) DEVICE — SUT PROLENE 5/0 RB-1 36 IN

## (undated) DEVICE — BLADE SURG #15 CARBON STEEL

## (undated) DEVICE — SUT 2-0 12-18IN SILK

## (undated) DEVICE — SEE MEDLINE ITEM 157144

## (undated) DEVICE — STAPLER SKIN PROXIMATE WIDE

## (undated) DEVICE — APPLIER LIGACLIP SM 9.38IN

## (undated) DEVICE — SYR 30CC LUER LOCK

## (undated) DEVICE — SUT VICRYL+ 1 CT1 18IN

## (undated) DEVICE — SUT SILK 3-0 SH DETACH 30IN

## (undated) DEVICE — SUT SILK 3-0 SH 18IN BLACK

## (undated) DEVICE — NDL 22GA X1 1/2 REG BEVEL

## (undated) DEVICE — GOWN SMART IMP BREATHABLE XXLG

## (undated) DEVICE — SUT 1 48IN PDS II VIO MONO

## (undated) DEVICE — GOWN SURGICAL X-LARGE

## (undated) DEVICE — CONTAINER SPECIMEN STRL 4OZ

## (undated) DEVICE — DRAPE ABDOMINAL TIBURON 14X11

## (undated) DEVICE — Device

## (undated) DEVICE — NDL BOX COUNTER

## (undated) DEVICE — SEE MEDLINE ITEM 152622

## (undated) DEVICE — ELECTRODE REM PLYHSV RETURN 9

## (undated) DEVICE — SUTURE PROLENE 4-0 RB-1

## (undated) DEVICE — SPONGE LAP 18X18 PREWASHED

## (undated) DEVICE — HANDLE SCISSORS HS ACE 23CM

## (undated) DEVICE — SUT 3-0 12-18IN SILK

## (undated) DEVICE — ELECTRODE EXTENDED BLADE

## (undated) DEVICE — GAUZE SPONGE 4X4 12PLY

## (undated) DEVICE — SEE MEDLINE ITEM 157206

## (undated) DEVICE — SEE MEDLINE ITEM 157128

## (undated) DEVICE — TRAY FOLEY 16FR INFECTION CONT